# Patient Record
Sex: MALE | Race: WHITE | Employment: OTHER | URBAN - METROPOLITAN AREA
[De-identification: names, ages, dates, MRNs, and addresses within clinical notes are randomized per-mention and may not be internally consistent; named-entity substitution may affect disease eponyms.]

---

## 2023-02-24 ENCOUNTER — APPOINTMENT (OUTPATIENT)
Dept: GENERAL RADIOLOGY | Age: 78
DRG: 282 | End: 2023-02-24
Attending: EMERGENCY MEDICINE
Payer: MEDICARE

## 2023-02-24 ENCOUNTER — HOSPITAL ENCOUNTER (INPATIENT)
Age: 78
LOS: 4 days | Discharge: HOME | DRG: 282 | End: 2023-02-28
Attending: EMERGENCY MEDICINE | Admitting: INTERNAL MEDICINE
Payer: MEDICARE

## 2023-02-24 DIAGNOSIS — I21.4 NON-ST ELEVATION MYOCARDIAL INFARCTION (NSTEMI) (HCC): ICD-10-CM

## 2023-02-24 DIAGNOSIS — I21.4 NSTEMI (NON-ST ELEVATED MYOCARDIAL INFARCTION) (HCC): ICD-10-CM

## 2023-02-24 DIAGNOSIS — R07.9 ACUTE CHEST PAIN: ICD-10-CM

## 2023-02-24 DIAGNOSIS — I10 PRIMARY HYPERTENSION: ICD-10-CM

## 2023-02-24 DIAGNOSIS — I35.0 AORTIC VALVE STENOSIS, ETIOLOGY OF CARDIAC VALVE DISEASE UNSPECIFIED: ICD-10-CM

## 2023-02-24 DIAGNOSIS — R77.8 ELEVATED TROPONIN: Primary | ICD-10-CM

## 2023-02-24 DIAGNOSIS — R07.9 CHEST PAIN, UNSPECIFIED TYPE: ICD-10-CM

## 2023-02-24 LAB
ALBUMIN SERPL-MCNC: 4 G/DL (ref 3.5–5)
ALBUMIN/GLOB SERPL: 1.2 (ref 1.1–2.2)
ALP SERPL-CCNC: 59 U/L (ref 45–117)
ALT SERPL-CCNC: 31 U/L (ref 12–78)
ANION GAP SERPL CALC-SCNC: 3 MMOL/L (ref 5–15)
APTT PPP: 27 SEC (ref 22.1–31)
APTT PPP: 27.1 SEC (ref 22.1–31)
AST SERPL-CCNC: 23 U/L (ref 15–37)
BASOPHILS # BLD: 0 K/UL (ref 0–0.1)
BASOPHILS # BLD: 0 K/UL (ref 0–0.1)
BASOPHILS NFR BLD: 0 % (ref 0–1)
BASOPHILS NFR BLD: 0 % (ref 0–1)
BILIRUB SERPL-MCNC: 0.6 MG/DL (ref 0.2–1)
BUN SERPL-MCNC: 17 MG/DL (ref 6–20)
BUN/CREAT SERPL: 16 (ref 12–20)
CALCIUM SERPL-MCNC: 9.2 MG/DL (ref 8.5–10.1)
CHLORIDE SERPL-SCNC: 106 MMOL/L (ref 97–108)
CO2 SERPL-SCNC: 29 MMOL/L (ref 21–32)
COMMENT, HOLDF: NORMAL
COMMENT, HOLDF: NORMAL
CREAT SERPL-MCNC: 1.04 MG/DL (ref 0.7–1.3)
D DIMER PPP FEU-MCNC: 0.35 MG/L FEU (ref 0–0.65)
DIFFERENTIAL METHOD BLD: NORMAL
DIFFERENTIAL METHOD BLD: NORMAL
EOSINOPHIL # BLD: 0.1 K/UL (ref 0–0.4)
EOSINOPHIL # BLD: 0.1 K/UL (ref 0–0.4)
EOSINOPHIL NFR BLD: 1 % (ref 0–7)
EOSINOPHIL NFR BLD: 2 % (ref 0–7)
ERYTHROCYTE [DISTWIDTH] IN BLOOD BY AUTOMATED COUNT: 12.6 % (ref 11.5–14.5)
ERYTHROCYTE [DISTWIDTH] IN BLOOD BY AUTOMATED COUNT: 12.6 % (ref 11.5–14.5)
GLOBULIN SER CALC-MCNC: 3.3 G/DL (ref 2–4)
GLUCOSE SERPL-MCNC: 103 MG/DL (ref 65–100)
HCT VFR BLD AUTO: 38.5 % (ref 36.6–50.3)
HCT VFR BLD AUTO: 40.1 % (ref 36.6–50.3)
HGB BLD-MCNC: 13 G/DL (ref 12.1–17)
HGB BLD-MCNC: 13.3 G/DL (ref 12.1–17)
IMM GRANULOCYTES # BLD AUTO: 0 K/UL (ref 0–0.04)
IMM GRANULOCYTES # BLD AUTO: 0 K/UL (ref 0–0.04)
IMM GRANULOCYTES NFR BLD AUTO: 0 % (ref 0–0.5)
IMM GRANULOCYTES NFR BLD AUTO: 0 % (ref 0–0.5)
INR PPP: 1 (ref 0.9–1.1)
LYMPHOCYTES # BLD: 1.7 K/UL (ref 0.8–3.5)
LYMPHOCYTES # BLD: 2.1 K/UL (ref 0.8–3.5)
LYMPHOCYTES NFR BLD: 24 % (ref 12–49)
LYMPHOCYTES NFR BLD: 35 % (ref 12–49)
MCH RBC QN AUTO: 29.6 PG (ref 26–34)
MCH RBC QN AUTO: 30.1 PG (ref 26–34)
MCHC RBC AUTO-ENTMCNC: 33.2 G/DL (ref 30–36.5)
MCHC RBC AUTO-ENTMCNC: 33.8 G/DL (ref 30–36.5)
MCV RBC AUTO: 89.1 FL (ref 80–99)
MCV RBC AUTO: 89.3 FL (ref 80–99)
MONOCYTES # BLD: 0.6 K/UL (ref 0–1)
MONOCYTES # BLD: 0.8 K/UL (ref 0–1)
MONOCYTES NFR BLD: 13 % (ref 5–13)
MONOCYTES NFR BLD: 8 % (ref 5–13)
NEUTS SEG # BLD: 3 K/UL (ref 1.8–8)
NEUTS SEG # BLD: 4.8 K/UL (ref 1.8–8)
NEUTS SEG NFR BLD: 50 % (ref 32–75)
NEUTS SEG NFR BLD: 67 % (ref 32–75)
NRBC # BLD: 0 K/UL (ref 0–0.01)
NRBC # BLD: 0 K/UL (ref 0–0.01)
NRBC BLD-RTO: 0 PER 100 WBC
NRBC BLD-RTO: 0 PER 100 WBC
PLATELET # BLD AUTO: 172 K/UL (ref 150–400)
PLATELET # BLD AUTO: 178 K/UL (ref 150–400)
PMV BLD AUTO: 10.5 FL (ref 8.9–12.9)
PMV BLD AUTO: 10.7 FL (ref 8.9–12.9)
POTASSIUM SERPL-SCNC: 3.9 MMOL/L (ref 3.5–5.1)
PROT SERPL-MCNC: 7.3 G/DL (ref 6.4–8.2)
PROTHROMBIN TIME: 10.6 SEC (ref 9–11.1)
RBC # BLD AUTO: 4.32 M/UL (ref 4.1–5.7)
RBC # BLD AUTO: 4.49 M/UL (ref 4.1–5.7)
SAMPLES BEING HELD,HOLD: NORMAL
SAMPLES BEING HELD,HOLD: NORMAL
SODIUM SERPL-SCNC: 138 MMOL/L (ref 136–145)
THERAPEUTIC RANGE,PTTT: NORMAL SECS (ref 58–77)
THERAPEUTIC RANGE,PTTT: NORMAL SECS (ref 58–77)
TROPONIN I SERPL HS-MCNC: 12 NG/L (ref 0–76)
TROPONIN I SERPL HS-MCNC: 132 NG/L (ref 0–76)
TROPONIN I SERPL HS-MCNC: 360 NG/L (ref 0–76)
UFH PPP CHRO-ACNC: <0.1 IU/ML
WBC # BLD AUTO: 6.1 K/UL (ref 4.1–11.1)
WBC # BLD AUTO: 7.2 K/UL (ref 4.1–11.1)

## 2023-02-24 PROCEDURE — 74011250637 HC RX REV CODE- 250/637: Performed by: INTERNAL MEDICINE

## 2023-02-24 PROCEDURE — 85025 COMPLETE CBC W/AUTO DIFF WBC: CPT

## 2023-02-24 PROCEDURE — 85379 FIBRIN DEGRADATION QUANT: CPT

## 2023-02-24 PROCEDURE — 71046 X-RAY EXAM CHEST 2 VIEWS: CPT

## 2023-02-24 PROCEDURE — 99285 EMERGENCY DEPT VISIT HI MDM: CPT

## 2023-02-24 PROCEDURE — 65270000046 HC RM TELEMETRY

## 2023-02-24 PROCEDURE — 74011250637 HC RX REV CODE- 250/637: Performed by: EMERGENCY MEDICINE

## 2023-02-24 PROCEDURE — 93005 ELECTROCARDIOGRAM TRACING: CPT

## 2023-02-24 PROCEDURE — APPSS30 APP SPLIT SHARED TIME 16-30 MINUTES: Performed by: NURSE PRACTITIONER

## 2023-02-24 PROCEDURE — 85610 PROTHROMBIN TIME: CPT

## 2023-02-24 PROCEDURE — 74011000250 HC RX REV CODE- 250: Performed by: INTERNAL MEDICINE

## 2023-02-24 PROCEDURE — 84484 ASSAY OF TROPONIN QUANT: CPT

## 2023-02-24 PROCEDURE — 85730 THROMBOPLASTIN TIME PARTIAL: CPT

## 2023-02-24 PROCEDURE — 85520 HEPARIN ASSAY: CPT

## 2023-02-24 PROCEDURE — 80053 COMPREHEN METABOLIC PANEL: CPT

## 2023-02-24 PROCEDURE — 36415 COLL VENOUS BLD VENIPUNCTURE: CPT

## 2023-02-24 PROCEDURE — 74011250636 HC RX REV CODE- 250/636: Performed by: INTERNAL MEDICINE

## 2023-02-24 PROCEDURE — 99223 1ST HOSP IP/OBS HIGH 75: CPT | Performed by: SPECIALIST

## 2023-02-24 RX ORDER — LABETALOL HCL 20 MG/4 ML
20 SYRINGE (ML) INTRAVENOUS
Status: DISCONTINUED | OUTPATIENT
Start: 2023-02-24 | End: 2023-02-28 | Stop reason: HOSPADM

## 2023-02-24 RX ORDER — ACETAMINOPHEN 650 MG/1
650 SUPPOSITORY RECTAL
Status: DISCONTINUED | OUTPATIENT
Start: 2023-02-24 | End: 2023-02-28 | Stop reason: HOSPADM

## 2023-02-24 RX ORDER — ROSUVASTATIN CALCIUM 10 MG/1
10 TABLET, COATED ORAL
COMMUNITY
End: 2023-04-17

## 2023-02-24 RX ORDER — TELMISARTAN 20 MG/1
20 TABLET ORAL DAILY
COMMUNITY
End: 2023-03-09

## 2023-02-24 RX ORDER — LOSARTAN POTASSIUM 50 MG/1
50 TABLET ORAL
Status: DISCONTINUED | OUTPATIENT
Start: 2023-02-24 | End: 2023-02-28 | Stop reason: HOSPADM

## 2023-02-24 RX ORDER — SODIUM CHLORIDE 0.9 % (FLUSH) 0.9 %
5-40 SYRINGE (ML) INJECTION AS NEEDED
Status: DISCONTINUED | OUTPATIENT
Start: 2023-02-24 | End: 2023-02-28 | Stop reason: HOSPADM

## 2023-02-24 RX ORDER — GUAIFENESIN 100 MG/5ML
81 LIQUID (ML) ORAL DAILY
Status: DISCONTINUED | OUTPATIENT
Start: 2023-02-25 | End: 2023-02-28 | Stop reason: HOSPADM

## 2023-02-24 RX ORDER — NITROGLYCERIN 0.4 MG/1
0.4 TABLET SUBLINGUAL AS NEEDED
Status: DISCONTINUED | OUTPATIENT
Start: 2023-02-24 | End: 2023-02-24 | Stop reason: SDUPTHER

## 2023-02-24 RX ORDER — NITROGLYCERIN 0.4 MG/1
0.4 TABLET SUBLINGUAL
Status: DISCONTINUED | OUTPATIENT
Start: 2023-02-24 | End: 2023-02-28 | Stop reason: HOSPADM

## 2023-02-24 RX ORDER — EZETIMIBE 10 MG/1
10 TABLET ORAL DAILY
COMMUNITY

## 2023-02-24 RX ORDER — SODIUM CHLORIDE 9 MG/ML
50 INJECTION, SOLUTION INTRAVENOUS CONTINUOUS
Status: DISCONTINUED | OUTPATIENT
Start: 2023-02-24 | End: 2023-02-28 | Stop reason: HOSPADM

## 2023-02-24 RX ORDER — EZETIMIBE 10 MG/1
10 TABLET ORAL DAILY
Status: DISCONTINUED | OUTPATIENT
Start: 2023-02-25 | End: 2023-02-28 | Stop reason: HOSPADM

## 2023-02-24 RX ORDER — FACIAL-BODY WIPES
10 EACH TOPICAL DAILY PRN
Status: DISCONTINUED | OUTPATIENT
Start: 2023-02-24 | End: 2023-02-28 | Stop reason: HOSPADM

## 2023-02-24 RX ORDER — ROSUVASTATIN CALCIUM 10 MG/1
10 TABLET, COATED ORAL
Status: DISCONTINUED | OUTPATIENT
Start: 2023-02-24 | End: 2023-02-25

## 2023-02-24 RX ORDER — PROMETHAZINE HYDROCHLORIDE 25 MG/1
12.5 TABLET ORAL
Status: DISCONTINUED | OUTPATIENT
Start: 2023-02-24 | End: 2023-02-28 | Stop reason: HOSPADM

## 2023-02-24 RX ORDER — ONDANSETRON 2 MG/ML
4 INJECTION INTRAMUSCULAR; INTRAVENOUS
Status: DISCONTINUED | OUTPATIENT
Start: 2023-02-24 | End: 2023-02-28 | Stop reason: HOSPADM

## 2023-02-24 RX ORDER — SODIUM CHLORIDE 0.9 % (FLUSH) 0.9 %
5-40 SYRINGE (ML) INJECTION EVERY 8 HOURS
Status: DISCONTINUED | OUTPATIENT
Start: 2023-02-24 | End: 2023-02-28 | Stop reason: HOSPADM

## 2023-02-24 RX ORDER — HEPARIN SODIUM 1000 [USP'U]/ML
4000 INJECTION, SOLUTION INTRAVENOUS; SUBCUTANEOUS ONCE
Status: COMPLETED | OUTPATIENT
Start: 2023-02-24 | End: 2023-02-24

## 2023-02-24 RX ORDER — ACETAMINOPHEN 325 MG/1
650 TABLET ORAL
Status: DISCONTINUED | OUTPATIENT
Start: 2023-02-24 | End: 2023-02-28 | Stop reason: HOSPADM

## 2023-02-24 RX ORDER — MORPHINE SULFATE 2 MG/ML
2 INJECTION, SOLUTION INTRAMUSCULAR; INTRAVENOUS
Status: DISCONTINUED | OUTPATIENT
Start: 2023-02-24 | End: 2023-02-28 | Stop reason: HOSPADM

## 2023-02-24 RX ORDER — HEPARIN SODIUM 10000 [USP'U]/100ML
9-25 INJECTION, SOLUTION INTRAVENOUS
Status: DISCONTINUED | OUTPATIENT
Start: 2023-02-24 | End: 2023-02-27

## 2023-02-24 RX ORDER — SODIUM CHLORIDE 9 MG/ML
25 INJECTION, SOLUTION INTRAVENOUS AS NEEDED
Status: DISCONTINUED | OUTPATIENT
Start: 2023-02-24 | End: 2023-02-28 | Stop reason: HOSPADM

## 2023-02-24 RX ADMIN — SODIUM CHLORIDE, PRESERVATIVE FREE 10 ML: 5 INJECTION INTRAVENOUS at 22:59

## 2023-02-24 RX ADMIN — SODIUM CHLORIDE, PRESERVATIVE FREE 10 ML: 5 INJECTION INTRAVENOUS at 18:34

## 2023-02-24 RX ADMIN — HEPARIN SODIUM 9 UNITS/KG/HR: 10000 INJECTION, SOLUTION INTRAVENOUS at 18:31

## 2023-02-24 RX ADMIN — HEPARIN SODIUM 4000 UNITS: 1000 INJECTION INTRAVENOUS; SUBCUTANEOUS at 18:25

## 2023-02-24 RX ADMIN — LOSARTAN POTASSIUM 50 MG: 50 TABLET, FILM COATED ORAL at 22:59

## 2023-02-24 RX ADMIN — NITROGLYCERIN 0.4 MG: 0.4 TABLET, ORALLY DISINTEGRATING SUBLINGUAL at 12:40

## 2023-02-24 RX ADMIN — ROSUVASTATIN CALCIUM 10 MG: 10 TABLET, COATED ORAL at 22:59

## 2023-02-24 RX ADMIN — SODIUM CHLORIDE 50 ML/HR: 9 INJECTION, SOLUTION INTRAVENOUS at 20:40

## 2023-02-24 NOTE — ED PROVIDER NOTES
HPI     Please note that this dictation was completed with [employer], the computer voice recognition software. Quite often unanticipated grammatical, syntax, homophones, and other interpretive errors are inadvertently transcribed by the computer software. Please disregard these errors. Please excuse any errors that have escaped final proofreading. 73-year-old male with a history of hypertension hypercholesterolemia here with substernal chest tightness occurring 30 minutes prior to arrival.  Currently 3-4 out of 10 in type. Nonradiating. Not associate with any shortness of breath, nausea, vomiting, sweating. He took 1 BC powder prior to arrival.  He feels lightheaded. Last stress test was 1 year ago performed by his PCP and what he reports is normal.  Positive family history of heart disease with his father. No other complaints at this time. No leg pain or swelling. Past Medical History:   Diagnosis Date    Hypercholesterolemia     Hypertension        No past surgical history on file. No family history on file. Social History     Socioeconomic History    Marital status: Not on file     Spouse name: Not on file    Number of children: Not on file    Years of education: Not on file    Highest education level: Not on file   Occupational History    Not on file   Tobacco Use    Smoking status: Not on file    Smokeless tobacco: Not on file   Substance and Sexual Activity    Alcohol use: Not on file    Drug use: Not on file    Sexual activity: Not on file   Other Topics Concern    Not on file   Social History Narrative    Not on file     Social Determinants of Health     Financial Resource Strain: Not on file   Food Insecurity: Not on file   Transportation Needs: Not on file   Physical Activity: Not on file   Stress: Not on file   Social Connections: Not on file   Intimate Partner Violence: Not on file   Housing Stability: Not on file         ALLERGIES: Patient has no known allergies.     Review of Systems   Constitutional:  Negative for chills and fever. Respiratory:  Positive for chest tightness. Negative for shortness of breath. Cardiovascular:  Positive for chest pain. Gastrointestinal:  Negative for diarrhea, nausea and vomiting. Vitals:    02/24/23 1225   BP: (!) 149/74   Pulse: 69   Resp: 18   Temp: 98.7 °F (37.1 °C)   SpO2: 98%   Weight: 104.3 kg (230 lb)   Height: 5' 11\" (1.803 m)            Physical Exam  Vitals and nursing note reviewed. Constitutional:       Appearance: Normal appearance. He is well-developed. HENT:      Head: Normocephalic and atraumatic. Nose: No congestion or rhinorrhea. Mouth/Throat:      Mouth: Mucous membranes are moist.   Eyes:      General: No scleral icterus. Right eye: No discharge. Left eye: No discharge. Conjunctiva/sclera: Conjunctivae normal.   Cardiovascular:      Rate and Rhythm: Normal rate and regular rhythm. Heart sounds: Normal heart sounds. No murmur heard. No friction rub. No gallop. Pulmonary:      Effort: Pulmonary effort is normal. No respiratory distress. Breath sounds: Normal breath sounds. No wheezing or rales. Abdominal:      General: There is no distension. Palpations: Abdomen is soft. Tenderness: There is no abdominal tenderness. There is no guarding. Musculoskeletal:         General: No swelling or deformity. Normal range of motion. Cervical back: Normal range of motion and neck supple. No rigidity. Right lower leg: No edema. Left lower leg: No edema. Lymphadenopathy:      Cervical: No cervical adenopathy. Skin:     General: Skin is warm and dry. Coloration: Skin is not jaundiced or pale. Findings: No rash. Neurological:      Mental Status: He is alert and oriented to person, place, and time.       Comments: MARIAN        Medical Decision Making  68-year-old male here with substernal chest pressure with risk factors of hypertension, hypercholesterolemia and family history. He took aspirin prior to arrival.  Differential diagnosis includes GERD, MI, angina and others. Will give nitroglycerin some lingual.  He already took aspirin prior to arrival.  Will check chest x-ray and labs. Amount and/or Complexity of Data Reviewed  Labs: ordered. Decision-making details documented in ED Course. Radiology: ordered. Decision-making details documented in ED Course. ECG/medicine tests: ordered and independent interpretation performed. Decision-making details documented in ED Course. Risk  Prescription drug management. Decision regarding hospitalization. ED Course as of 02/24/23 1619   Fri Feb 24, 2023   1459 Updated patient on lab delay as I had to add on the labs. Answered his questions. [RG]   7519 Patient is currently chest pain-free after 1 nitroglycerin. [RG]      ED Course User Index  [RG] María Elena Crocker MD       Procedures      ED EKG interpretation:  Rhythm: normal sinus rhythm; and regular . Rate (approx.): 70; Axis: normal; P wave: normal; QRS interval: normal ; ST/T wave: normal;  This EKG was interpreted by Delila Denver, MD,ED Provider. 4:20 PM  Elevated repeat trop. Consulted cardiology and will admit. Remains cp free. Perfect Serve Consult for Admission  4:20 PM    ED Room Number: ER02/02  Patient Name and age:  Brisa Jackson 76 y.o.  male  Working Diagnosis:   1. Elevated troponin    2. Acute chest pain        COVID-19 Suspicion:  no  Sepsis present:  no  Reassessment needed: no  Code Status:  Full Code  Readmission: no  Isolation Requirements:  no  Recommended Level of Care:  telemetry  Department: [facility] ED - [phone]      Other:  trop from 12 to 132. Cp free after 1 ntg. Took a bc powder. Consulting cardiology.           Recent Results (from the past 24 hour(s))   EKG, 12 LEAD, INITIAL    Collection Time: 02/24/23 12:09 PM   Result Value Ref Range    Ventricular Rate 70 BPM Atrial Rate 70 BPM    P-R Interval 168 ms    QRS Duration 98 ms    Q-T Interval 366 ms    QTC Calculation (Bezet) 395 ms    Calculated P Axis 54 degrees    Calculated R Axis 7 degrees    Calculated T Axis 39 degrees    Diagnosis       Normal sinus rhythm  Normal ECG  When compared with ECG of 20-SEP-1997 12:25,  Previous ECG has undetermined rhythm, needs review  QRS axis shifted left  Criteria for Lateral infarct are no longer present  Criteria for Inferior infarct are no longer present  T wave inversion no longer evident in Lateral leads     SAMPLES BEING HELD    Collection Time: 02/24/23 12:24 PM   Result Value Ref Range    SAMPLES BEING HELD 1BLUE,1PST,1SST,1LAV,1RED,1DK GRN     COMMENT        Add-on orders for these samples will be processed based on acceptable specimen integrity and analyte stability, which may vary by analyte. CBC WITH AUTOMATED DIFF    Collection Time: 02/24/23 12:24 PM   Result Value Ref Range    WBC 6.1 4.1 - 11.1 K/uL    RBC 4.49 4. 10 - 5.70 M/uL    HGB 13.3 12.1 - 17.0 g/dL    HCT 40.1 36.6 - 50.3 %    MCV 89.3 80.0 - 99.0 FL    MCH 29.6 26.0 - 34.0 PG    MCHC 33.2 30.0 - 36.5 g/dL    RDW 12.6 11.5 - 14.5 %    PLATELET 728 142 - 192 K/uL    MPV 10.7 8.9 - 12.9 FL    NRBC 0.0 0  WBC    ABSOLUTE NRBC 0.00 0.00 - 0.01 K/uL    NEUTROPHILS 50 32 - 75 %    LYMPHOCYTES 35 12 - 49 %    MONOCYTES 13 5 - 13 %    EOSINOPHILS 2 0 - 7 %    BASOPHILS 0 0 - 1 %    IMMATURE GRANULOCYTES 0 0.0 - 0.5 %    ABS. NEUTROPHILS 3.0 1.8 - 8.0 K/UL    ABS. LYMPHOCYTES 2.1 0.8 - 3.5 K/UL    ABS. MONOCYTES 0.8 0.0 - 1.0 K/UL    ABS. EOSINOPHILS 0.1 0.0 - 0.4 K/UL    ABS. BASOPHILS 0.0 0.0 - 0.1 K/UL    ABS. IMM.  GRANS. 0.0 0.00 - 0.04 K/UL    DF AUTOMATED     METABOLIC PANEL, COMPREHENSIVE    Collection Time: 02/24/23 12:24 PM   Result Value Ref Range    Sodium 138 136 - 145 mmol/L    Potassium 3.9 3.5 - 5.1 mmol/L    Chloride 106 97 - 108 mmol/L    CO2 29 21 - 32 mmol/L    Anion gap 3 (L) 5 - 15 mmol/L Glucose 103 (H) 65 - 100 mg/dL    BUN 17 6 - 20 MG/DL    Creatinine 1.04 0.70 - 1.30 MG/DL    BUN/Creatinine ratio 16 12 - 20      eGFR >60 >60 ml/min/1.73m2    Calcium 9.2 8.5 - 10.1 MG/DL    Bilirubin, total 0.6 0.2 - 1.0 MG/DL    ALT (SGPT) 31 12 - 78 U/L    AST (SGOT) 23 15 - 37 U/L    Alk. phosphatase 59 45 - 117 U/L    Protein, total 7.3 6.4 - 8.2 g/dL    Albumin 4.0 3.5 - 5.0 g/dL    Globulin 3.3 2.0 - 4.0 g/dL    A-G Ratio 1.2 1.1 - 2.2     PROTHROMBIN TIME + INR    Collection Time: 02/24/23 12:24 PM   Result Value Ref Range    INR 1.0 0.9 - 1.1      Prothrombin time 10.6 9.0 - 11.1 sec   PTT    Collection Time: 02/24/23 12:24 PM   Result Value Ref Range    aPTT 27.1 22.1 - 31.0 sec    aPTT, therapeutic range     58.0 - 77.0 SECS   TROPONIN-HIGH SENSITIVITY    Collection Time: 02/24/23 12:24 PM   Result Value Ref Range    Troponin-High Sensitivity 12 0 - 76 ng/L   TROPONIN-HIGH SENSITIVITY    Collection Time: 02/24/23  3:11 PM   Result Value Ref Range    Troponin-High Sensitivity 132 (HH) 0 - 76 ng/L       XR CHEST PA LAT    Result Date: 2/24/2023  INDICATION:  chest pain. EXAM: 2 VIEW CHEST RADIOGRAPH. COMPARISON: None. FINDINGS: Frontal and lateral views of the chest show a moderately large hiatal hernia. No focal infiltrate or effusion. No CHF pattern. . . Minimal bibasilar atelectasis. The heart, mediastinum and pulmonary vasculature are upper normal. The bony thorax is unremarkable for age. ..      Minimal bibasilar atelectasis. Moderately large hiatal hernia. Gilda Verde

## 2023-02-24 NOTE — ED NOTES
Assumed care of the patient from [address] [place] [address]. Patient noted to be on the cardiac monitor, verbalized not happy with the news to be admitted to the hospital, labs reviewed.

## 2023-02-24 NOTE — H&P
Steve Andrade [facility] [address]  [address], [place], [address] [facility]  [phone]    Hospitalist Admission History and Physical      NAME:  Ramila Brenner   :   1945   MRN:  [id number]     PCP:  None     Date/Time of service:  2023  4:54 PM        Subjective:     CHIEF COMPLAINT: chest pain     HISTORY OF PRESENT ILLNESS:     The patient is a 75 yo hx of HTN, hyperlipidemia, presented w/ chest pain, NSTEMI. The patient c/o \"chest tightness\" today. The pain was non-radiating, not associated with dyspnea or diaphoresis. In the ED, his chest pain has improved. EKG non-ischemic, but Trop increased from 0 to 132. No Known Allergies    Prior to Admission medications    Medication Sig Start Date End Date Taking? Authorizing Provider   rosuvastatin (CRESTOR) 10 mg tablet Take 10 mg by mouth nightly. Yes Other, MD Jana   ezetimibe (ZETIA) 10 mg tablet Take 10 mg by mouth. Yes Other, MD Jana   telmisartan (MICARDIS) 20 mg tablet Take 20 mg by mouth daily. Yes Other, MD Jana       Past Medical History:   Diagnosis Date    Hypercholesterolemia     Hypertension         No past surgical history on file.     Social History     Tobacco Use    Smoking status: Former     Types: Cigarettes    Smokeless tobacco: Never   Substance Use Topics    Alcohol use: Not Currently        Family History   Problem Relation Age of Onset    Heart Disease Father         Review of Systems:  (bold if positive, if negative)    Gen:  Eyes:  ENT:  CVS:  chest pain, Pulm:  GI:  :  MS:  Skin:  Psych:  Endo:  Hem:  Renal:  Neuro:          Objective:      VITALS:    Vital signs reviewed; most recent are:    Visit Vitals  BP (!) 164/59   Pulse 71   Temp 97.9 °F (36.6 °C)   Resp 20   Ht 5' 11\" (1.803 m)   Wt 104.3 kg (230 lb)   SpO2 100%   BMI 32.08 kg/m²     SpO2 Readings from Last 6 Encounters:   23 100%        No intake or output data in the 24 hours ending 23 6457     Exam:     Physical Exam:    Gen:  Well-developed, well-nourished, morbidly obese, in no acute distress  HEENT:  Pink conjunctivae, PERRL, hearing intact to voice, moist mucous membranes  Neck:  Supple, without masses, thyroid non-tender  Resp:  No accessory muscle use, clear breath sounds without wheezes rales or rhonchi  Card:  No murmurs, normal S1, S2 without thrills, bruits or peripheral edema  Abd:  Soft, non-tender, non-distended, normoactive bowel sounds are present  Lymph:  No cervical adenopathy  Musc:  No cyanosis or clubbing  Skin:  No rashes   Neuro:  Cranial nerves 3-12 are grossly intact, follows commands appropriately  Psych:  Alert with good insight. Oriented to person, place, and time    Labs:    Recent Labs     02/24/23  1224   WBC 6.1   HGB 13.3   HCT 40.1        Recent Labs     02/24/23  1224      K 3.9      CO2 29   *   BUN 17   CREA 1.04   CA 9.2   ALB 4.0   TBILI 0.6   ALT 31     No results found for: GLUCPOC  No results for input(s): PH, PCO2, PO2, HCO3, FIO2 in the last 72 hours. Recent Labs     02/24/23  1224   INR 1.0       Radiology and EKG reviewed:   CXR neg    **Prior records, notes, labs, radiology, and medications reviewed in [facility]**       Assessment/Plan:       Principal Problem:    77 yo hx of HTN, hyperlipidemia, presented w/ chest pain, NSTEMI    1) Angina/NSTEMI (non-ST elevated myocardial infarction): EKG non-ischemic, but Trop increased from 0 to 132 in 3 hours. Will monitor on Tele. Check serial enzymes, lipids, A1C, echo. Start ASA, nitro prn, IV morphine prn, heparin gtt. Consult Cards for cath    2) HTN: BP high. Resume home ARB. Use IV labetalol prn    3) Hyperlipidemia: recheck lipids.   Cont statin    Risk of deterioration: high      Total time spent with patient care: 79 Minutes **I personally saw and examined the patient during this time period**                 Care Plan discussed with: Patient, nursing, wife     Discussed:  Care Plan    Prophylaxis:   heparin gtt    Probable Disposition:  Home w/Family           ___________________________________________________    Attending Physician: Lesia Magallon MD

## 2023-02-24 NOTE — Clinical Note
Pain assessment performed by BEBE Villegas RN placed in [place] , and applied to [employer]

## 2023-02-24 NOTE — PROGRESS NOTES
[address] [facility]                    Cardiology Care Note     [x]Initial Encounter     []Follow-up    Patient Name: Cortney Irwin - VNS:[birth year] - EGW:[id number]  Primary Cardiologist: none   Consulting Cardiologist: [employer] Cardiology Physicians: Mateo Arcos MD     Reason for encounter: CP    HPI:       Cortney Irwin is a 76 y.o. male with PMH significant for HTN and HLD    Pt reports earlier today he felt a tightness in his chest and discomfort. Nothing seemed to aggravate or relieve the pain. Became concerned so came to the ED. He denies any palpitations, SOB, edema. No prior cardiac hx. Reports his father had \"heart problems\" but did not have a heart attack. Trop mildly elevated since admission, chest xray with hiatal hernia which pt was unaware of. Subjective:      Cortney Irwin reports none. Assessment and Plan     CP: could be partially caused by mod large hiatal hernia. Initial troponin 12-->132. Trend troponin for now, if cont to rise could start on lovenox bid. Check TTE. EKG non ischemic. Further plans tbd, pt wants to be discharged today if possible     2. Murmur: check TTE    3. Hiatal hernia: mod large seen on chest xray    4. HTN: on micardis PTA     5. HLD: on crestor , Zetia          ____________________________________________________________    Cardiac testing    N/a     Most recent HS troponins:  Recent Labs     02/24/23  1511 02/24/23  1224   TROPHS 132* 12       ECG: normal sinus rhythm    Review of Systems:    [x]All other systems reviewed and all negative except as written in HPI    [] Patient unable to provide secondary to condition    Past Medical History:   Diagnosis Date    Hypercholesterolemia     Hypertension      No past surgical history on file. Social Hx:  reports that he has quit smoking. His smoking use included cigarettes.  He has never used smokeless tobacco. He reports that he does not currently use alcohol. Family Hx: family history includes Heart Disease in his father. No Known Allergies       OBJECTIVE:  Wt Readings from Last 3 Encounters:   02/24/23 104.3 kg (230 lb)     No intake or output data in the 24 hours ending 02/24/23 1627    Physical Exam:    Vitals:   Vitals:    02/24/23 1225 02/24/23 1257 02/24/23 1541   BP: (!) 149/74 119/75 (!) 155/79   Pulse: 69 66 61   Resp: 18 17 18   Temp: 98.7 °F (37.1 °C) 98.2 °F (36.8 °C) 97.9 °F (36.6 °C)   SpO2: 98% 96% 99%   Weight: 104.3 kg (230 lb)     Height: 5' 11\" (1.803 m)       Telemetry: normal sinus rhythm    Gen: Well-developed, well-nourished, in no acute distress  Neck: Supple, No JVD, No Carotid Bruit  Resp: No accessory muscle use, Clear breath sounds, No rales or rhonchi  Card: Regular Rate,Rythm, Normal S1, S2, 2/6 murmurs, no rubs or gallop. No thrills. Abd:   Soft, non-tender, non-distended, BS+   MSK: No cyanosis  Skin: No rashes    Neuro: Moving all four extremities, follows commands appropriately  Psych: Good insight, oriented to person, place, alert, Nml Affect  LE: No edema    Data Review:     Radiology:   XR Results (most recent):  Results from Hospital Encounter encounter on 02/24/23    XR CHEST PA LAT    Narrative  INDICATION:  chest pain. EXAM: 2 VIEW CHEST RADIOGRAPH. COMPARISON: None. FINDINGS: Frontal and lateral views of the chest show a moderately large hiatal  hernia. No focal infiltrate or effusion. No CHF pattern. . . Minimal bibasilar  atelectasis. The heart, mediastinum and pulmonary vasculature are upper normal.  The bony thorax is unremarkable for age. ..    Impression  Minimal bibasilar atelectasis. Moderately large hiatal hernia. .      Recent Labs     02/24/23  1224      K 3.9      CO2 29   BUN 17   CREA 1.04   *   CA 9.2     Recent Labs     02/24/23  1224   WBC 6.1   HGB 13.3   HCT 40.1        Recent Labs     02/24/23  1224   PTP 10.6   INR 1.0   AP 59     No results for input(s): CHOL, LDLC in the last 72 hours. No lab exists for component: TGL, HDLC,  HBA1C      Current meds:    Current Facility-Administered Medications:     nitroglycerin (NITROSTAT) tablet 0.4 mg, 0.4 mg, SubLINGual, Q5MIN PRN, Adia Lawrence MD, 0.4 mg at 02/24/23 1240    Current Outpatient Medications:     rosuvastatin (CRESTOR) 10 mg tablet, Take 10 mg by mouth nightly., Disp: , Rfl:     ezetimibe (ZETIA) 10 mg tablet, Take 10 mg by mouth., Disp: , Rfl:     telmisartan (MICARDIS) 20 mg tablet, Take 20 mg by mouth daily. , Disp: , Rfl:     Keira Contreras NP    [facility] Cardiology  Call center: (U) [phone]  (N) [phone]      CC:None

## 2023-02-24 NOTE — Clinical Note
TRANSFER - OUT REPORT:     Verbal report given to: Ida Vides . Report consisted of patient's Situation, Background, Assessment and   Recommendations(SBAR). Opportunity for questions and clarification was provided. Patient transported with a Registered Nurse.

## 2023-02-24 NOTE — Clinical Note
TRANSFER - OUT REPORT:     Verbal report given to: Quinten Estrada . Report consisted of patient's Situation, Background, Assessment and   Recommendations(SBAR). Opportunity for questions and clarification was provided. Patient transported with a Registered Nurse.

## 2023-02-24 NOTE — PROGRESS NOTES
[address] [facility]                    Cardiology Care Note     [x]Initial Encounter     []Follow-up    Patient Name: Marcie Teague - RDL:[birth year] - WXB:[id number]  Primary Cardiologist: none   Consulting Cardiologist: [facility] Cardiology Physicians: David Warren MD     Reason for encounter: CP    HPI:       Marcie Teague is a 76 y.o. male with PMH significant for HTN and HLD    Pt reports earlier today he felt a tightness in his chest and discomfort. Nothing seemed to aggravate or relieve the pain. Became concerned so came to the ED. He denies any palpitations, SOB, edema. No prior cardiac hx. Reports his father had \"heart problems\" but did not have a heart attack. Trop mildly elevated since admission, chest xray with hiatal hernia which pt was unaware of. Subjective:      Marcie Teague reports none. Assessment and Plan     CP: could be partially caused by mod large hiatal hernia. Initial troponin 12-->132. Trend troponin for now, if cont to rise could start on lovenox bid. Check TTE. EKG non ischemic. Further plans tbd, pt wants to be discharged today if possible     2. Murmur: check TTE    3. Hiatal hernia: mod large seen on chest xray    4. HTN: on micardis PTA     5. HLD: on crestor , Zetia          ____________________________________________________________    Cardiac testing    N/a     Most recent HS troponins:  Recent Labs     02/24/23  1511 02/24/23  1224   TROPHS 132* 12       ECG: normal sinus rhythm    Review of Systems:    [x]All other systems reviewed and all negative except as written in HPI    [] Patient unable to provide secondary to condition    Past Medical History:   Diagnosis Date    Hypercholesterolemia     Hypertension      No past surgical history on file. Social Hx:  reports that he has quit smoking. His smoking use included cigarettes.  He has never used smokeless tobacco. He reports that he does not currently use alcohol. Family Hx: family history includes Heart Disease in his father. No Known Allergies       OBJECTIVE:  Wt Readings from Last 3 Encounters:   02/24/23 104.3 kg (230 lb)     No intake or output data in the 24 hours ending 02/24/23 1627    Physical Exam:    Vitals:   Vitals:    02/24/23 1225 02/24/23 1257 02/24/23 1541   BP: (!) 149/74 119/75 (!) 155/79   Pulse: 69 66 61   Resp: 18 17 18   Temp: 98.7 °F (37.1 °C) 98.2 °F (36.8 °C) 97.9 °F (36.6 °C)   SpO2: 98% 96% 99%   Weight: 104.3 kg (230 lb)     Height: 5' 11\" (1.803 m)       Telemetry: normal sinus rhythm    Gen: Well-developed, well-nourished, in no acute distress  Neck: Supple, No JVD, No Carotid Bruit  Resp: No accessory muscle use, Clear breath sounds, No rales or rhonchi  Card: Regular Rate,Rythm, Normal S1, S2, 2/6 murmurs, no rubs or gallop. No thrills. Abd:   Soft, non-tender, non-distended, BS+   MSK: No cyanosis  Skin: No rashes    Neuro: Moving all four extremities, follows commands appropriately  Psych: Good insight, oriented to person, place, alert, Nml Affect  LE: No edema    Data Review:     Radiology:   XR Results (most recent):  Results from Hospital Encounter encounter on 02/24/23    XR CHEST PA LAT    Narrative  INDICATION:  chest pain. EXAM: 2 VIEW CHEST RADIOGRAPH. COMPARISON: None. FINDINGS: Frontal and lateral views of the chest show a moderately large hiatal  hernia. No focal infiltrate or effusion. No CHF pattern. . . Minimal bibasilar  atelectasis. The heart, mediastinum and pulmonary vasculature are upper normal.  The bony thorax is unremarkable for age. ..    Impression  Minimal bibasilar atelectasis. Moderately large hiatal hernia. .      Recent Labs     02/24/23  1224      K 3.9      CO2 29   BUN 17   CREA 1.04   *   CA 9.2     Recent Labs     02/24/23  1224   WBC 6.1   HGB 13.3   HCT 40.1        Recent Labs     02/24/23  1224   PTP 10.6   INR 1.0   AP 59     No results for input(s): CHOL, LDLC in the last 72 hours. No lab exists for component: TGL, HDLC,  HBA1C      Current meds:    Current Facility-Administered Medications:     nitroglycerin (NITROSTAT) tablet 0.4 mg, 0.4 mg, SubLINGual, Q5MIN PRN, Lora Thakkar MD, 0.4 mg at 02/24/23 1240    Current Outpatient Medications:     rosuvastatin (CRESTOR) 10 mg tablet, Take 10 mg by mouth nightly., Disp: , Rfl:     ezetimibe (ZETIA) 10 mg tablet, Take 10 mg by mouth., Disp: , Rfl:     telmisartan (MICARDIS) 20 mg tablet, Take 20 mg by mouth daily. , Disp: , Rfl:     Ramseh Jensen NP    [facility] Cardiology  Call center: (z) [phone] (F) [phone]      CC:None

## 2023-02-24 NOTE — Clinical Note
Diagnostic catheter inserted over exchange length wire.   [address] [facility] No

## 2023-02-24 NOTE — ED PROVIDER NOTES
HPI     Please note that this dictation was completed with weipass, the computer voice recognition software. Quite often unanticipated grammatical, syntax, homophones, and other interpretive errors are inadvertently transcribed by the computer software. Please disregard these errors. Please excuse any errors that have escaped final proofreading. 77-year-old male with a history of hypertension hypercholesterolemia here with substernal chest tightness occurring 30 minutes prior to arrival.  Currently 3-4 out of 10 in type. Nonradiating. Not associate with any shortness of breath, nausea, vomiting, sweating. He took 1 BC powder prior to arrival.  He feels lightheaded. Last stress test was 1 year ago performed by his PCP and what he reports is normal.  Positive family history of heart disease with his father. No other complaints at this time. No leg pain or swelling. Past Medical History:   Diagnosis Date    Hypercholesterolemia     Hypertension        No past surgical history on file. No family history on file. Social History     Socioeconomic History    Marital status: Not on file     Spouse name: Not on file    Number of children: Not on file    Years of education: Not on file    Highest education level: Not on file   Occupational History    Not on file   Tobacco Use    Smoking status: Not on file    Smokeless tobacco: Not on file   Substance and Sexual Activity    Alcohol use: Not on file    Drug use: Not on file    Sexual activity: Not on file   Other Topics Concern    Not on file   Social History Narrative    Not on file     Social Determinants of Health     Financial Resource Strain: Not on file   Food Insecurity: Not on file   Transportation Needs: Not on file   Physical Activity: Not on file   Stress: Not on file   Social Connections: Not on file   Intimate Partner Violence: Not on file   Housing Stability: Not on file         ALLERGIES: Patient has no known allergies.     Review of Systems   Constitutional:  Negative for chills and fever. Respiratory:  Positive for chest tightness. Negative for shortness of breath. Cardiovascular:  Positive for chest pain. Gastrointestinal:  Negative for diarrhea, nausea and vomiting. Vitals:    02/24/23 1225   BP: (!) 149/74   Pulse: 69   Resp: 18   Temp: 98.7 °F (37.1 °C)   SpO2: 98%   Weight: 104.3 kg (230 lb)   Height: 5' 11\" (1.803 m)            Physical Exam  Vitals and nursing note reviewed. Constitutional:       Appearance: Normal appearance. He is well-developed. HENT:      Head: Normocephalic and atraumatic. Nose: No congestion or rhinorrhea. Mouth/Throat:      Mouth: Mucous membranes are moist.   Eyes:      General: No scleral icterus. Right eye: No discharge. Left eye: No discharge. Conjunctiva/sclera: Conjunctivae normal.   Cardiovascular:      Rate and Rhythm: Normal rate and regular rhythm. Heart sounds: Normal heart sounds. No murmur heard. No friction rub. No gallop. Pulmonary:      Effort: Pulmonary effort is normal. No respiratory distress. Breath sounds: Normal breath sounds. No wheezing or rales. Abdominal:      General: There is no distension. Palpations: Abdomen is soft. Tenderness: There is no abdominal tenderness. There is no guarding. Musculoskeletal:         General: No swelling or deformity. Normal range of motion. Cervical back: Normal range of motion and neck supple. No rigidity. Right lower leg: No edema. Left lower leg: No edema. Lymphadenopathy:      Cervical: No cervical adenopathy. Skin:     General: Skin is warm and dry. Coloration: Skin is not jaundiced or pale. Findings: No rash. Neurological:      Mental Status: He is alert and oriented to person, place, and time.       Comments: MARIAN        Medical Decision Making  77-year-old male here with substernal chest pressure with risk factors of hypertension, hypercholesterolemia and family history. He took aspirin prior to arrival.  Differential diagnosis includes GERD, MI, angina and others. Will give nitroglycerin some lingual.  He already took aspirin prior to arrival.  Will check chest x-ray and labs. Amount and/or Complexity of Data Reviewed  Labs: ordered. Decision-making details documented in ED Course. Radiology: ordered. Decision-making details documented in ED Course. ECG/medicine tests: ordered and independent interpretation performed. Decision-making details documented in ED Course. Risk  Prescription drug management. Decision regarding hospitalization. ED Course as of 02/24/23 1619   Fri Feb 24, 2023   1459 Updated patient on lab delay as I had to add on the labs. Answered his questions. [RG]   3980 Patient is currently chest pain-free after 1 nitroglycerin. [RG]      ED Course User Index  [RG] Yoni Millan MD       Procedures      ED EKG interpretation:  Rhythm: normal sinus rhythm; and regular . Rate (approx.): 70; Axis: normal; P wave: normal; QRS interval: normal ; ST/T wave: normal;  This EKG was interpreted by Wendy Greenfield MD,ED Provider. 4:20 PM  Elevated repeat trop. Consulted cardiology and will admit. Remains cp free. Perfect Serve Consult for Admission  4:20 PM    ED Room Number: ER02/02  Patient Name and age:  Leopold Kitty 76 y.o.  male  Working Diagnosis:   1. Elevated troponin    2. Acute chest pain        COVID-19 Suspicion:  no  Sepsis present:  no  Reassessment needed: no  Code Status:  Full Code  Readmission: no  Isolation Requirements:  no  Recommended Level of Care:  telemetry  Department: [facility] ED - [phone]      Other:  trop from 12 to 132. Cp free after 1 ntg. Took a bc powder. Consulting cardiology.           Recent Results (from the past 24 hour(s))   EKG, 12 LEAD, INITIAL    Collection Time: 02/24/23 12:09 PM   Result Value Ref Range    Ventricular Rate 70 BPM Atrial Rate 70 BPM    P-R Interval 168 ms    QRS Duration 98 ms    Q-T Interval 366 ms    QTC Calculation (Bezet) 395 ms    Calculated P Axis 54 degrees    Calculated R Axis 7 degrees    Calculated T Axis 39 degrees    Diagnosis       Normal sinus rhythm  Normal ECG  When compared with ECG of 20-SEP-1997 12:25,  Previous ECG has undetermined rhythm, needs review  QRS axis shifted left  Criteria for Lateral infarct are no longer present  Criteria for Inferior infarct are no longer present  T wave inversion no longer evident in Lateral leads     SAMPLES BEING HELD    Collection Time: 02/24/23 12:24 PM   Result Value Ref Range    SAMPLES BEING HELD 1BLUE,1PST,1SST,1LAV,1RED,1DK GRN     COMMENT        Add-on orders for these samples will be processed based on acceptable specimen integrity and analyte stability, which may vary by analyte. CBC WITH AUTOMATED DIFF    Collection Time: 02/24/23 12:24 PM   Result Value Ref Range    WBC 6.1 4.1 - 11.1 K/uL    RBC 4.49 4. 10 - 5.70 M/uL    HGB 13.3 12.1 - 17.0 g/dL    HCT 40.1 36.6 - 50.3 %    MCV 89.3 80.0 - 99.0 FL    MCH 29.6 26.0 - 34.0 PG    MCHC 33.2 30.0 - 36.5 g/dL    RDW 12.6 11.5 - 14.5 %    PLATELET 844 001 - 876 K/uL    MPV 10.7 8.9 - 12.9 FL    NRBC 0.0 0  WBC    ABSOLUTE NRBC 0.00 0.00 - 0.01 K/uL    NEUTROPHILS 50 32 - 75 %    LYMPHOCYTES 35 12 - 49 %    MONOCYTES 13 5 - 13 %    EOSINOPHILS 2 0 - 7 %    BASOPHILS 0 0 - 1 %    IMMATURE GRANULOCYTES 0 0.0 - 0.5 %    ABS. NEUTROPHILS 3.0 1.8 - 8.0 K/UL    ABS. LYMPHOCYTES 2.1 0.8 - 3.5 K/UL    ABS. MONOCYTES 0.8 0.0 - 1.0 K/UL    ABS. EOSINOPHILS 0.1 0.0 - 0.4 K/UL    ABS. BASOPHILS 0.0 0.0 - 0.1 K/UL    ABS. IMM.  GRANS. 0.0 0.00 - 0.04 K/UL    DF AUTOMATED     METABOLIC PANEL, COMPREHENSIVE    Collection Time: 02/24/23 12:24 PM   Result Value Ref Range    Sodium 138 136 - 145 mmol/L    Potassium 3.9 3.5 - 5.1 mmol/L    Chloride 106 97 - 108 mmol/L    CO2 29 21 - 32 mmol/L    Anion gap 3 (L) 5 - 15 mmol/L Glucose 103 (H) 65 - 100 mg/dL    BUN 17 6 - 20 MG/DL    Creatinine 1.04 0.70 - 1.30 MG/DL    BUN/Creatinine ratio 16 12 - 20      eGFR >60 >60 ml/min/1.73m2    Calcium 9.2 8.5 - 10.1 MG/DL    Bilirubin, total 0.6 0.2 - 1.0 MG/DL    ALT (SGPT) 31 12 - 78 U/L    AST (SGOT) 23 15 - 37 U/L    Alk. phosphatase 59 45 - 117 U/L    Protein, total 7.3 6.4 - 8.2 g/dL    Albumin 4.0 3.5 - 5.0 g/dL    Globulin 3.3 2.0 - 4.0 g/dL    A-G Ratio 1.2 1.1 - 2.2     PROTHROMBIN TIME + INR    Collection Time: 02/24/23 12:24 PM   Result Value Ref Range    INR 1.0 0.9 - 1.1      Prothrombin time 10.6 9.0 - 11.1 sec   PTT    Collection Time: 02/24/23 12:24 PM   Result Value Ref Range    aPTT 27.1 22.1 - 31.0 sec    aPTT, therapeutic range     58.0 - 77.0 SECS   TROPONIN-HIGH SENSITIVITY    Collection Time: 02/24/23 12:24 PM   Result Value Ref Range    Troponin-High Sensitivity 12 0 - 76 ng/L   TROPONIN-HIGH SENSITIVITY    Collection Time: 02/24/23  3:11 PM   Result Value Ref Range    Troponin-High Sensitivity 132 (HH) 0 - 76 ng/L       XR CHEST PA LAT    Result Date: 2/24/2023  INDICATION:  chest pain. EXAM: 2 VIEW CHEST RADIOGRAPH. COMPARISON: None. FINDINGS: Frontal and lateral views of the chest show a moderately large hiatal hernia. No focal infiltrate or effusion. No CHF pattern. . . Minimal bibasilar atelectasis. The heart, mediastinum and pulmonary vasculature are upper normal. The bony thorax is unremarkable for age. ..      Minimal bibasilar atelectasis. Moderately large hiatal hernia. Boom Thurston

## 2023-02-24 NOTE — ED TRIAGE NOTES
Pt c/o onset center chest tightness 30 min pta while getting dressed, non radiating, no other symptoms associated with it, took one bc for the pain, currently pain  is 3-4/10, appears in nad.

## 2023-02-24 NOTE — ED NOTES
Assumed care of the patient from [address] [place] [address]. Patient noted to be on the cardiac monitor, verbalized not happy with the news to be admitted to the hospital, labs reviewed.

## 2023-02-24 NOTE — ED NOTES
Labs sent per cardiology if patient 3rd troponin is less than last hold off on starting a heparin drip. Patient and family updated on plan of care.

## 2023-02-24 NOTE — ED NOTES
Heparin drip started and verified by B[facility] [place], education regarding side effects given to patient and family.

## 2023-02-24 NOTE — ED NOTES
Heparin drip started and verified by Nahum Sexton, education regarding side effects given to patient and family.

## 2023-02-24 NOTE — H&P
Steve Andrade [facility]  [address] [facility], [address] [place] [address]  [phone]    Hospitalist Admission History and Physical      NAME:  Yash Martin   :   1945   MRN:  [id number]     PCP:  None     Date/Time of service:  2023  4:54 PM        Subjective:     CHIEF COMPLAINT: chest pain     HISTORY OF PRESENT ILLNESS:     The patient is a 77 yo hx of HTN, hyperlipidemia, presented w/ chest pain, NSTEMI. The patient c/o \"chest tightness\" today. The pain was non-radiating, not associated with dyspnea or diaphoresis. In the ED, his chest pain has improved. EKG non-ischemic, but Trop increased from 0 to 132. No Known Allergies    Prior to Admission medications    Medication Sig Start Date End Date Taking? Authorizing Provider   rosuvastatin (CRESTOR) 10 mg tablet Take 10 mg by mouth nightly. Yes Other, MD Jana   ezetimibe (ZETIA) 10 mg tablet Take 10 mg by mouth. Yes Other, MD Jana   telmisartan (MICARDIS) 20 mg tablet Take 20 mg by mouth daily. Yes Other, MD Jana       Past Medical History:   Diagnosis Date    Hypercholesterolemia     Hypertension         No past surgical history on file.     Social History     Tobacco Use    Smoking status: Former     Types: Cigarettes    Smokeless tobacco: Never   Substance Use Topics    Alcohol use: Not Currently        Family History   Problem Relation Age of Onset    Heart Disease Father         Review of Systems:  (bold if positive, if negative)    Gen:  Eyes:  ENT:  CVS:  chest pain, Pulm:  GI:  :  MS:  Skin:  Psych:  Endo:  Hem:  Renal:  Neuro:          Objective:      VITALS:    Vital signs reviewed; most recent are:    Visit Vitals  BP (!) 164/59   Pulse 71   Temp 97.9 °F (36.6 °C)   Resp 20   Ht 5' 11\" (1.803 m)   Wt 104.3 kg (230 lb)   SpO2 100%   BMI 32.08 kg/m²     SpO2 Readings from Last 6 Encounters:   23 100%        No intake or output data in the 24 hours ending 23 8307     Exam:     Physical Exam:    Gen:  Well-developed, well-nourished, morbidly obese, in no acute distress  HEENT:  Pink conjunctivae, PERRL, hearing intact to voice, moist mucous membranes  Neck:  Supple, without masses, thyroid non-tender  Resp:  No accessory muscle use, clear breath sounds without wheezes rales or rhonchi  Card:  No murmurs, normal S1, S2 without thrills, bruits or peripheral edema  Abd:  Soft, non-tender, non-distended, normoactive bowel sounds are present  Lymph:  No cervical adenopathy  Musc:  No cyanosis or clubbing  Skin:  No rashes   Neuro:  Cranial nerves 3-12 are grossly intact, follows commands appropriately  Psych:  Alert with good insight. Oriented to person, place, and time    Labs:    Recent Labs     02/24/23  1224   WBC 6.1   HGB 13.3   HCT 40.1        Recent Labs     02/24/23  1224      K 3.9      CO2 29   *   BUN 17   CREA 1.04   CA 9.2   ALB 4.0   TBILI 0.6   ALT 31     No results found for: GLUCPOC  No results for input(s): PH, PCO2, PO2, HCO3, FIO2 in the last 72 hours. Recent Labs     02/24/23  1224   INR 1.0       Radiology and EKG reviewed:   CXR neg    **Prior records, notes, labs, radiology, and medications reviewed in [facility]**       Assessment/Plan:       Principal Problem:    75 yo hx of HTN, hyperlipidemia, presented w/ chest pain, NSTEMI    1) Angina/NSTEMI (non-ST elevated myocardial infarction): EKG non-ischemic, but Trop increased from 0 to 132 in 3 hours. Will monitor on Tele. Check serial enzymes, lipids, A1C, echo. Start ASA, nitro prn, IV morphine prn, heparin gtt. Consult Cards for cath    2) HTN: BP high. Resume home ARB. Use IV labetalol prn    3) Hyperlipidemia: recheck lipids.   Cont statin    Risk of deterioration: high      Total time spent with patient care: 79 Minutes **I personally saw and examined the patient during this time period**                 Care Plan discussed with: Patient, nursing, wife     Discussed:  Care Plan    Prophylaxis:   heparin gtt    Probable Disposition:  Home w/Family           ___________________________________________________    Attending Physician: Ld Ryan MD

## 2023-02-25 ENCOUNTER — APPOINTMENT (OUTPATIENT)
Dept: NON INVASIVE DIAGNOSTICS | Age: 78
DRG: 282 | End: 2023-02-25
Attending: INTERNAL MEDICINE
Payer: MEDICARE

## 2023-02-25 LAB
ALBUMIN SERPL-MCNC: 3.4 G/DL (ref 3.5–5)
ALBUMIN/GLOB SERPL: 1.2 (ref 1.1–2.2)
ALP SERPL-CCNC: 48 U/L (ref 45–117)
ALT SERPL-CCNC: 31 U/L (ref 12–78)
ANION GAP SERPL CALC-SCNC: 4 MMOL/L (ref 5–15)
AST SERPL-CCNC: 32 U/L (ref 15–37)
BILIRUB DIRECT SERPL-MCNC: 0.1 MG/DL (ref 0–0.2)
BILIRUB SERPL-MCNC: 1 MG/DL (ref 0.2–1)
BNP SERPL-MCNC: 89 PG/ML
BUN SERPL-MCNC: 13 MG/DL (ref 6–20)
BUN/CREAT SERPL: 16 (ref 12–20)
CALCIUM SERPL-MCNC: 8.5 MG/DL (ref 8.5–10.1)
CHLORIDE SERPL-SCNC: 106 MMOL/L (ref 97–108)
CHOLEST SERPL-MCNC: 110 MG/DL
CO2 SERPL-SCNC: 28 MMOL/L (ref 21–32)
CREAT SERPL-MCNC: 0.79 MG/DL (ref 0.7–1.3)
ECHO AO ARCH DIAM: 3.3 CM
ECHO AO ASC DIAM: 3.4 CM
ECHO AO ASCENDING AORTA INDEX: 1.52 CM/M2
ECHO AR MAX VEL PISA: 4.9 M/S
ECHO AV AREA PEAK VELOCITY: 0.8 CM2
ECHO AV AREA PLAN: 1.1 CM2
ECHO AV AREA PLAN: 1.1 CM2
ECHO AV AREA VTI: 0.7 CM2
ECHO AV AREA/BSA PEAK VELOCITY: 0.4 CM2/M2
ECHO AV AREA/BSA VTI: 0.3 CM2/M2
ECHO AV MEAN GRADIENT: 31 MMHG
ECHO AV MEAN VELOCITY: 2.7 M/S
ECHO AV PEAK GRADIENT: 52 MMHG
ECHO AV PEAK VELOCITY: 3.6 M/S
ECHO AV REGURGITANT PHT: 339.7 MILLISECOND
ECHO AV VELOCITY RATIO: 0.25
ECHO AV VTI: 93.8 CM
ECHO LA DIAMETER INDEX: 1.61 CM/M2
ECHO LA DIAMETER: 3.6 CM
ECHO LA VOL 2C: 41 ML (ref 18–58)
ECHO LA VOL 4C: 38 ML (ref 18–58)
ECHO LA VOL BP: 40 ML (ref 18–58)
ECHO LA VOL/BSA BIPLANE: 18 ML/M2 (ref 16–34)
ECHO LA VOLUME AREA LENGTH: 43 ML
ECHO LA VOLUME INDEX A2C: 18 ML/M2 (ref 16–34)
ECHO LA VOLUME INDEX A4C: 17 ML/M2 (ref 16–34)
ECHO LA VOLUME INDEX AREA LENGTH: 19 ML/M2 (ref 16–34)
ECHO LV E' LATERAL VELOCITY: 8 CM/S
ECHO LV E' SEPTAL VELOCITY: 9 CM/S
ECHO LV EDV A2C: 103 ML
ECHO LV EDV A4C: 110 ML
ECHO LV EDV BP: 109 ML (ref 67–155)
ECHO LV EDV INDEX A4C: 49 ML/M2
ECHO LV EDV INDEX BP: 49 ML/M2
ECHO LV EDV NDEX A2C: 46 ML/M2
ECHO LV EJECTION FRACTION A2C: 66 %
ECHO LV EJECTION FRACTION A4C: 48 %
ECHO LV EJECTION FRACTION BIPLANE: 56 % (ref 55–100)
ECHO LV ESV A2C: 35 ML
ECHO LV ESV A4C: 57 ML
ECHO LV ESV BP: 48 ML (ref 22–58)
ECHO LV ESV INDEX A2C: 16 ML/M2
ECHO LV ESV INDEX A4C: 25 ML/M2
ECHO LV ESV INDEX BP: 21 ML/M2
ECHO LV FRACTIONAL SHORTENING: 37 % (ref 28–44)
ECHO LV INTERNAL DIMENSION DIASTOLE INDEX: 2.28 CM/M2
ECHO LV INTERNAL DIMENSION DIASTOLIC: 5.1 CM (ref 4.2–5.9)
ECHO LV INTERNAL DIMENSION SYSTOLIC INDEX: 1.43 CM/M2
ECHO LV INTERNAL DIMENSION SYSTOLIC: 3.2 CM
ECHO LV IVSD: 0.9 CM (ref 0.6–1)
ECHO LV MASS 2D: 163.6 G (ref 88–224)
ECHO LV MASS INDEX 2D: 73 G/M2 (ref 49–115)
ECHO LV POSTERIOR WALL DIASTOLIC: 0.9 CM (ref 0.6–1)
ECHO LV RELATIVE WALL THICKNESS RATIO: 0.35
ECHO LVOT AREA: 3.5 CM2
ECHO LVOT AV VTI INDEX: 0.21
ECHO LVOT DIAM: 2.1 CM
ECHO LVOT MEAN GRADIENT: 2 MMHG
ECHO LVOT PEAK GRADIENT: 3 MMHG
ECHO LVOT PEAK VELOCITY: 0.9 M/S
ECHO LVOT STROKE VOLUME INDEX: 30.1 ML/M2
ECHO LVOT SV: 67.5 ML
ECHO LVOT VTI: 19.5 CM
ECHO MV A VELOCITY: 0.93 M/S
ECHO MV E DECELERATION TIME (DT): 251.9 MS
ECHO MV E VELOCITY: 0.77 M/S
ECHO MV E/A RATIO: 0.83
ECHO MV E/E' LATERAL: 9.63
ECHO MV E/E' RATIO (AVERAGED): 9.09
ECHO MV E/E' SEPTAL: 8.56
ECHO MV REGURGITANT PEAK GRADIENT: 144 MMHG
ECHO MV REGURGITANT PEAK VELOCITY: 6 M/S
ECHO PULMONARY ARTERY END DIASTOLIC PRESSURE: 6 MMHG
ECHO PV MAX VELOCITY: 1.2 M/S
ECHO PV PEAK GRADIENT: 6 MMHG
ECHO PV REGURGITANT MAX VELOCITY: 1.2 M/S
ECHO RV FREE WALL PEAK S': 14 CM/S
ECHO RV INTERNAL DIMENSION: 3.8 CM
ECHO RV TAPSE: 2.5 CM (ref 1.7–?)
ECHO TV REGURGITANT MAX VELOCITY: 2.63 M/S
ECHO TV REGURGITANT PEAK GRADIENT: 28 MMHG
ERYTHROCYTE [DISTWIDTH] IN BLOOD BY AUTOMATED COUNT: 12.5 % (ref 11.5–14.5)
EST. AVERAGE GLUCOSE BLD GHB EST-MCNC: 114 MG/DL
GLOBULIN SER CALC-MCNC: 2.8 G/DL (ref 2–4)
GLUCOSE SERPL-MCNC: 97 MG/DL (ref 65–100)
HBA1C MFR BLD: 5.6 % (ref 4–5.6)
HCT VFR BLD AUTO: 36.9 % (ref 36.6–50.3)
HDLC SERPL-MCNC: 52 MG/DL
HDLC SERPL: 2.1 (ref 0–5)
HGB BLD-MCNC: 12.1 G/DL (ref 12.1–17)
LDLC SERPL CALC-MCNC: 44.2 MG/DL (ref 0–100)
MAGNESIUM SERPL-MCNC: 1.9 MG/DL (ref 1.6–2.4)
MCH RBC QN AUTO: 29.2 PG (ref 26–34)
MCHC RBC AUTO-ENTMCNC: 32.8 G/DL (ref 30–36.5)
MCV RBC AUTO: 88.9 FL (ref 80–99)
NRBC # BLD: 0 K/UL (ref 0–0.01)
NRBC BLD-RTO: 0 PER 100 WBC
PHOSPHATE SERPL-MCNC: 3.4 MG/DL (ref 2.6–4.7)
PLATELET # BLD AUTO: 146 K/UL (ref 150–400)
PMV BLD AUTO: 10.4 FL (ref 8.9–12.9)
POTASSIUM SERPL-SCNC: 4 MMOL/L (ref 3.5–5.1)
PROT SERPL-MCNC: 6.2 G/DL (ref 6.4–8.2)
RBC # BLD AUTO: 4.15 M/UL (ref 4.1–5.7)
SODIUM SERPL-SCNC: 138 MMOL/L (ref 136–145)
TRIGL SERPL-MCNC: 69 MG/DL (ref ?–150)
TROPONIN I SERPL HS-MCNC: 2628 NG/L (ref 0–76)
TROPONIN I SERPL HS-MCNC: 3777 NG/L (ref 0–76)
UFH PPP CHRO-ACNC: 0.46 IU/ML
UFH PPP CHRO-ACNC: 0.56 IU/ML
UFH PPP CHRO-ACNC: 0.58 IU/ML
VLDLC SERPL CALC-MCNC: 13.8 MG/DL
WBC # BLD AUTO: 6 K/UL (ref 4.1–11.1)

## 2023-02-25 PROCEDURE — 36415 COLL VENOUS BLD VENIPUNCTURE: CPT

## 2023-02-25 PROCEDURE — 93306 TTE W/DOPPLER COMPLETE: CPT | Performed by: INTERNAL MEDICINE

## 2023-02-25 PROCEDURE — 83036 HEMOGLOBIN GLYCOSYLATED A1C: CPT

## 2023-02-25 PROCEDURE — 85027 COMPLETE CBC AUTOMATED: CPT

## 2023-02-25 PROCEDURE — 93306 TTE W/DOPPLER COMPLETE: CPT

## 2023-02-25 PROCEDURE — 99233 SBSQ HOSP IP/OBS HIGH 50: CPT | Performed by: INTERNAL MEDICINE

## 2023-02-25 PROCEDURE — 74011250637 HC RX REV CODE- 250/637: Performed by: INTERNAL MEDICINE

## 2023-02-25 PROCEDURE — 84484 ASSAY OF TROPONIN QUANT: CPT

## 2023-02-25 PROCEDURE — 85520 HEPARIN ASSAY: CPT

## 2023-02-25 PROCEDURE — 74011000250 HC RX REV CODE- 250: Performed by: INTERNAL MEDICINE

## 2023-02-25 PROCEDURE — 83735 ASSAY OF MAGNESIUM: CPT

## 2023-02-25 PROCEDURE — 80061 LIPID PANEL: CPT

## 2023-02-25 PROCEDURE — 65270000046 HC RM TELEMETRY

## 2023-02-25 PROCEDURE — 83880 ASSAY OF NATRIURETIC PEPTIDE: CPT

## 2023-02-25 PROCEDURE — 74011250636 HC RX REV CODE- 250/636: Performed by: INTERNAL MEDICINE

## 2023-02-25 PROCEDURE — 93005 ELECTROCARDIOGRAM TRACING: CPT

## 2023-02-25 PROCEDURE — 84100 ASSAY OF PHOSPHORUS: CPT

## 2023-02-25 PROCEDURE — 80048 BASIC METABOLIC PNL TOTAL CA: CPT

## 2023-02-25 PROCEDURE — 80076 HEPATIC FUNCTION PANEL: CPT

## 2023-02-25 RX ORDER — ROSUVASTATIN CALCIUM 10 MG/1
20 TABLET, COATED ORAL
Status: DISCONTINUED | OUTPATIENT
Start: 2023-02-25 | End: 2023-02-28 | Stop reason: HOSPADM

## 2023-02-25 RX ADMIN — SODIUM CHLORIDE, PRESERVATIVE FREE 10 ML: 5 INJECTION INTRAVENOUS at 20:35

## 2023-02-25 RX ADMIN — SODIUM CHLORIDE, PRESERVATIVE FREE 10 ML: 5 INJECTION INTRAVENOUS at 08:17

## 2023-02-25 RX ADMIN — ASPIRIN 81 MG: 81 TABLET, CHEWABLE ORAL at 08:17

## 2023-02-25 RX ADMIN — EZETIMIBE 10 MG: 10 TABLET ORAL at 08:17

## 2023-02-25 RX ADMIN — ROSUVASTATIN CALCIUM 20 MG: 10 TABLET, COATED ORAL at 21:10

## 2023-02-25 RX ADMIN — LOSARTAN POTASSIUM 50 MG: 50 TABLET, FILM COATED ORAL at 20:35

## 2023-02-25 RX ADMIN — HEPARIN SODIUM 9 UNITS/KG/HR: 10000 INJECTION, SOLUTION INTRAVENOUS at 20:38

## 2023-02-25 NOTE — ED NOTES
Bedside and Verbal shift change report given to Vernon Graves RN (oncoming nurse) by Ying Sparks RN (offgoing nurse). Report included the following information SBAR, ED Summary, and MAR.

## 2023-02-25 NOTE — ED NOTES
Report given to [employer]. Care signed off the patient. Heparin drip infusing and verified, no signs of bleeding noted, morning labs sent.

## 2023-02-25 NOTE — PROGRESS NOTES
Steve Andrade [facility]  [address] [facility], [address] [place] [address]  [phone]      Medical Progress Note      NAME: Arleth Putnam   :  1945  MRM:  [id number]    Date/Time: 2023  2:05 PM           Assessment / Plan:     75 yo hx of HTN, hyperlipidemia, presented w/ chest pain, NSTEMI:    NSTEMI  Presented with chest pain and serially upward trending troponin consistent with NSTEMI. Patient seen and evaluated at bedside this morning reporting no further chest tightness. Has been started on heparin drip which will be continued; continue to monitor APTT as well as hemoglobin while on heparin drip. Patient was assessed in the emergency room with cardiology; discussed with cardiology afterwards with plans for coronary angiogram given rapidly increase in troponin. Reviewed echocardiogram with cardiology and noted moderate aortic stenosis. .. Unclear at this point whether patient experienced increased demand which reflected into bump troponin. Nonetheless coronary angiogram will further risk stratify on whether patient has any coronary artery disease. We will further optimize medications post cath regards to antiplatelet, beta-blocker, and statin therapy. Essential hypertension  Monitor and titrate medications accordingly. Hyperlipidemia  Reviewed lipid profile. Home statin therapy. Updated wife at bedside. Total time spent on critical care 35 minutes. Care Plan discussed with: Patient, Family, and Consultant/Specialist    Prophylaxis: Heparin drip. Disposition:  Home w/Family           ___________________________________________________    Attending Physician: Nehemias Lowe MD        Subjective:     Chief Complaint: Lying comfortably in bed. No current chest tightness or chest pain. No current nausea or vomiting or diaphoresis. Wife at bedside. ROS:  Rest of review of systems negative otherwise.         Objective:       Vitals:     Last 24hrs VS reviewed since prior progress note. Most recent are:    Visit Vitals  BP (!) 142/85   Pulse 67   Temp 97.9 °F (36.6 °C)   Resp 21   Ht 5' 11\" (1.803 m)   Wt 104.3 kg (229 lb 15 oz)   SpO2 96%   BMI 32.07 kg/m²     SpO2 Readings from Last 6 Encounters:   02/25/23 96%          Intake/Output Summary (Last 24 hours) at 2/25/2023 1405  Last data filed at 2/25/2023 0730  Gross per 24 hour   Intake 963.71 ml   Output --   Net 963.71 ml          Exam:     Physical Exam:    Gen:  No acute distress. HEENT:  NC/AT. Pink conjunctivae, hearing intact to voice, moist mucous membranes. Neck:  Supple. Resp:  Unlabored breathing. Clear breath sounds with good air entry. No wheezes rales or rhonchi. Card:  Regular rate and rhythm. Normal S1 and S2. No murmurs. Abd:  Soft, non-tender, non-distended, normoactive bowel sounds. Ext: No clubbing, cyanosis or edema. Skin:  No rashes or ulcers, skin turgor is good. Neuro:  Moving all extremities with no gross focal deficits appreciated. Psych:  Good insight, oriented to person, place and time, alert.     Medications Reviewed: (see below)    Lab Data Reviewed: (see below)  ______________________________________________________________________    Medications:     Current Facility-Administered Medications   Medication Dose Route Frequency    rosuvastatin (CRESTOR) tablet 20 mg  20 mg Oral QHS    nitroglycerin (NITROSTAT) tablet 0.4 mg  0.4 mg SubLINGual Q5MIN PRN    morphine injection 2 mg  2 mg IntraVENous Q4H PRN    aspirin chewable tablet 81 mg  81 mg Oral DAILY    heparin 25,000 units in D5W 250 ml infusion  9-25 Units/kg/hr IntraVENous TITRATE    labetaloL (NORMODYNE;TRANDATE) 20 mg/4 mL (5 mg/mL) injection 20 mg  20 mg IntraVENous Q4H PRN    ezetimibe (ZETIA) tablet 10 mg  10 mg Oral DAILY    losartan (COZAAR) tablet 50 mg  50 mg Oral QHS    0.9% sodium chloride infusion  50 mL/hr IntraVENous CONTINUOUS    sodium chloride (NS) flush 5-40 mL  5-40 mL IntraVENous Q8H sodium chloride (NS) flush 5-40 mL  5-40 mL IntraVENous PRN    0.9% sodium chloride infusion 25 mL  25 mL IntraVENous PRN    acetaminophen (TYLENOL) tablet 650 mg  650 mg Oral Q6H PRN    Or    acetaminophen (TYLENOL) suppository 650 mg  650 mg Rectal Q6H PRN    bisacodyL (DULCOLAX) suppository 10 mg  10 mg Rectal DAILY PRN    promethazine (PHENERGAN) tablet 12.5 mg  12.5 mg Oral Q6H PRN    Or    ondansetron (ZOFRAN) injection 4 mg  4 mg IntraVENous Q6H PRN     Current Outpatient Medications   Medication Sig    rosuvastatin (CRESTOR) 10 mg tablet Take 10 mg by mouth nightly.    ezetimibe (ZETIA) 10 mg tablet Take 10 mg by mouth. telmisartan (MICARDIS) 20 mg tablet Take 20 mg by mouth daily.         Lab Review:     Recent Labs     02/25/23  0228 02/24/23  1653 02/24/23  1224   WBC 6.0 7.2 6.1   HGB 12.1 13.0 13.3   HCT 36.9 38.5 40.1   * 172 178     Recent Labs     02/25/23  0228 02/24/23  1224    138   K 4.0 3.9    106   CO2 28 29   GLU 97 103*   BUN 13 17   CREA 0.79 1.04   CA 8.5 9.2   MG 1.9  --    PHOS 3.4  --    ALB 3.4* 4.0   ALT 31 31   INR  --  1.0     No components found for: [place]

## 2023-02-25 NOTE — ED NOTES
Bedside and Verbal shift change report given to Billy Severino RN (oncoming nurse) by Mendel Hull RN (offgoing nurse). Report included the following information SBAR, ED Summary, and MAR.

## 2023-02-25 NOTE — PROGRESS NOTES
[facility]                    Cardiology Care Note     []Initial Encounter     [x]Follow-up    Patient Name: Arleth Putnam - RN - EZI:[id number]  Primary Cardiologist: none   Consulting Cardiologist: [facility] Physicians: Ryan Watters MD     Reason for encounter: CP    HPI:       Arleth Putnam is a 76 y.o. male with PMH significant for HTN and HLD    Pt reports earlier today he felt a tightness in his chest and discomfort. Nothing seemed to aggravate or relieve the pain. Became concerned so came to the ED. He denies any palpitations, SOB, edema. No prior cardiac hx. Reports his father had \"heart problems\" but did not have a heart attack. Trop mildly elevated since admission, chest xray with hiatal hernia which pt was unaware of. Subjective:      Arleth Putnam reports no further CP, SOB, palpitations. Assessment and Plan     NSTEMI  Presented with chest tightness. EKG non-ischemic. However, his tropnins was significantly elevated this AM and continues to trend upward (12-->132-->360-->3,777). His risk factors include HTN, HLD, and obesity.  - given significant Trops elevated, would warrant further ischemic evaluation  - Cont Heparin gtt per ACS protolol  - cont ASA 81 mg daily  - increase Crestor to 20 mg daily  - cont Losartan, keep BP<130/90  - fu on final TTE results, prelim read demonstrated EF around 55% with evidence of moderate AS  - NPO after midnight on  for C catheterization on Monday    CP:   could be partially caused by mod large hiatal hernia.  Ruled in with Troponins.  - management as noted above    Aortic stenosis  - FU with final TTE read    Hiatal hernia: mod large seen on chest xray    HTN: on micardis PTA     HLD: on crestor , Zetia          ____________________________________________________________    Cardiac testing    N/a     Most recent HS troponins:  Recent Labs 02/24/23  1705 02/24/23  1511 02/24/23  1224   TROPHS 360* 132* 12       ECG: normal sinus rhythm    Review of Systems:    [x]All other systems reviewed and all negative except as written in HPI    [] Patient unable to provide secondary to condition    Past Medical History:   Diagnosis Date    Hypercholesterolemia     Hypertension      No past surgical history on file. Social Hx:  reports that he has quit smoking. His smoking use included cigarettes. He has never used smokeless tobacco. He reports that he does not currently use alcohol. Family Hx: family history includes Heart Disease in his father. No Known Allergies       OBJECTIVE:  Wt Readings from Last 3 Encounters:   02/24/23 230 lb (104.3 kg)       Intake/Output Summary (Last 24 hours) at 2/24/2023 2131  Last data filed at 2/24/2023 2123  Gross per 24 hour   Intake 300 ml   Output --   Net 300 ml       Physical Exam:    Vitals:   Vitals:    02/24/23 1853 02/24/23 2000 02/24/23 2053 02/24/23 2055   BP: (!) 130/106 (!) 113/38 (!) 136/48    Pulse: 64 66 61 65   Resp: 17 17 19 20   Temp:       SpO2: 95% 92% 95% 95%   Weight:       Height:         Telemetry: normal sinus rhythm    Gen: Well-developed, well-nourished, in no acute distress  Neck: Supple, No JVD, No Carotid Bruit  Resp: No accessory muscle use, Clear breath sounds, No rales or rhonchi  Card: Regular Rate,Rythm, Normal S1, S2, 2/6 murmurs, no rubs or gallop. No thrills. Abd:   Soft, non-tender, non-distended, BS+   MSK: No cyanosis  Skin: No rashes    Neuro: Moving all four extremities, follows commands appropriately  Psych: Good insight, oriented to person, place, alert, Nml Affect  LE: No edema    Data Review:     Radiology:   XR Results (most recent):  Results from Hospital Encounter encounter on 02/24/23    XR CHEST PA LAT    Narrative  INDICATION:  chest pain. EXAM: 2 VIEW CHEST RADIOGRAPH. COMPARISON: None.     FINDINGS: Frontal and lateral views of the chest show a moderately large hiatal  hernia. No focal infiltrate or effusion. No CHF pattern. . . Minimal bibasilar  atelectasis. The heart, mediastinum and pulmonary vasculature are upper normal.  The bony thorax is unremarkable for age. ..    Impression  Minimal bibasilar atelectasis. Moderately large hiatal hernia. .      Recent Labs     02/24/23  1224      K 3.9      CO2 29   BUN 17   CREA 1.04   *   CA 9.2     Recent Labs     02/24/23  1653 02/24/23  1224   WBC 7.2 6.1   HGB 13.0 13.3   HCT 38.5 40.1    178     Recent Labs     02/24/23  1224   PTP 10.6   INR 1.0   AP 59     No results for input(s): CHOL, LDLC in the last 72 hours.     No lab exists for component: TGL, HDLC,  HBA1C      Current meds:    Current Facility-Administered Medications:     nitroglycerin (NITROSTAT) tablet 0.4 mg, 0.4 mg, SubLINGual, Q5MIN PRN, Jame Irving MD, 0.4 mg at 02/24/23 1240    morphine injection 2 mg, 2 mg, IntraVENous, Q4H PRN, Jame Irving MD    [START ON 2/25/2023] aspirin chewable tablet 81 mg, 81 mg, Oral, DAILY, Jame Irving MD    heparin 25,000 units in D5W 250 ml infusion, 9-25 Units/kg/hr, IntraVENous, TITRATE, Jame Irving MD, Last Rate: 9.4 mL/hr at 02/24/23 1831, 9 Units/kg/hr at 02/24/23 1831    labetaloL (NORMODYNE;TRANDATE) 20 mg/4 mL (5 mg/mL) injection 20 mg, 20 mg, IntraVENous, Q4H PRN, Jose F Irving MD    [START ON 2/25/2023] ezetimibe (ZETIA) tablet 10 mg, 10 mg, Oral, DAILY, Jose F Irving MD    rosuvastatin (CRESTOR) tablet 10 mg, 10 mg, Oral, QHS, Jose F Irving MD    losartan (COZAAR) tablet 50 mg, 50 mg, Oral, QHS, Jose F Irving MD    0.9% sodium chloride infusion, 50 mL/hr, IntraVENous, CONTINUOUS, Jose F Irving MD, Last Rate: 50 mL/hr at 02/24/23 2040, 50 mL/hr at 02/24/23 2040    sodium chloride (NS) flush 5-40 mL, 5-40 mL, IntraVENous, Q8H, Jame Irving MD, 10 mL at 02/24/23 1834    sodium chloride (NS) flush 5-40 mL, 5-40 mL, IntraVENous, PRN, Jose F Irving MD    0.9% sodium chloride infusion 25 mL, 25 mL, IntraVENous, PRN, Jame Irving MD    acetaminophen (TYLENOL) tablet 650 mg, 650 mg, Oral, Q6H PRN **OR** acetaminophen (TYLENOL) suppository 650 mg, 650 mg, Rectal, Q6H PRN, Jame Irving MD    bisacodyL (DULCOLAX) suppository 10 mg, 10 mg, Rectal, DAILY PRN, Jame Irving MD    promethazine (PHENERGAN) tablet 12.5 mg, 12.5 mg, Oral, Q6H PRN **OR** ondansetron (ZOFRAN) injection 4 mg, 4 mg, IntraVENous, Q6H PRN, Jame Irving MD    Current Outpatient Medications:     rosuvastatin (CRESTOR) 10 mg tablet, Take 10 mg by mouth nightly., Disp: , Rfl:     ezetimibe (ZETIA) 10 mg tablet, Take 10 mg by mouth., Disp: , Rfl:     telmisartan (MICARDIS) 20 mg tablet, Take 20 mg by mouth daily. , Disp: , Rfl:     Keri Paris MD    [facility] Cardiology  Call center: W) [phone]  (W) [phone]      CC:None

## 2023-02-25 NOTE — ED NOTES
Report given to [employer] Care signed off the patient. Heparin drip infusing and verified, no signs of bleeding noted, morning labs sent.

## 2023-02-25 NOTE — PROGRESS NOTES
[facility]                    Cardiology Care Note     []Initial Encounter     [x]Follow-up    Patient Name: Rachel Patel - U:[id number] - ANE:[id number]  Primary Cardiologist: none   Consulting Cardiologist: [facility] Physicians: Lark Kanner MD     Reason for encounter: CP    HPI:       Rachel Patle is a 76 y.o. male with PMH significant for HTN and HLD    Pt reports earlier today he felt a tightness in his chest and discomfort. Nothing seemed to aggravate or relieve the pain. Became concerned so came to the ED. He denies any palpitations, SOB, edema. No prior cardiac hx. Reports his father had \"heart problems\" but did not have a heart attack. Trop mildly elevated since admission, chest xray with hiatal hernia which pt was unaware of. Subjective:      Rachel Patel reports no further CP, SOB, palpitations. Assessment and Plan     NSTEMI  Presented with chest tightness. EKG non-ischemic. However, his tropnins was significantly elevated this AM and continues to trend upward (12-->132-->360-->3,777). His risk factors include HTN, HLD, and obesity.  - given significant Trops elevated, would warrant further ischemic evaluation  - Cont Heparin gtt per ACS protolol  - cont ASA 81 mg daily  - increase Crestor to 20 mg daily  - cont Losartan, keep BP<130/90  - fu on final TTE results, prelim read demonstrated EF around 55% with evidence of moderate AS  - NPO after midnight on Sunday for C catheterization on Monday    CP:   could be partially caused by mod large hiatal hernia.  Ruled in with Troponins.  - management as noted above    Aortic stenosis  - FU with final TTE read    Hiatal hernia: mod large seen on chest xray    HTN: on micardis PTA     HLD: on crestor , Zetia          ____________________________________________________________    Cardiac testing    N/a     Most recent HS troponins:  Recent Labs 02/24/23  1705 02/24/23  1511 02/24/23  1224   TROPHS 360* 132* 12       ECG: normal sinus rhythm    Review of Systems:    [x]All other systems reviewed and all negative except as written in HPI    [] Patient unable to provide secondary to condition    Past Medical History:   Diagnosis Date    Hypercholesterolemia     Hypertension      No past surgical history on file. Social Hx:  reports that he has quit smoking. His smoking use included cigarettes. He has never used smokeless tobacco. He reports that he does not currently use alcohol. Family Hx: family history includes Heart Disease in his father. No Known Allergies       OBJECTIVE:  Wt Readings from Last 3 Encounters:   02/24/23 230 lb (104.3 kg)       Intake/Output Summary (Last 24 hours) at 2/24/2023 2131  Last data filed at 2/24/2023 2123  Gross per 24 hour   Intake 300 ml   Output --   Net 300 ml       Physical Exam:    Vitals:   Vitals:    02/24/23 1853 02/24/23 2000 02/24/23 2053 02/24/23 2055   BP: (!) 130/106 (!) 113/38 (!) 136/48    Pulse: 64 66 61 65   Resp: 17 17 19 20   Temp:       SpO2: 95% 92% 95% 95%   Weight:       Height:         Telemetry: normal sinus rhythm    Gen: Well-developed, well-nourished, in no acute distress  Neck: Supple, No JVD, No Carotid Bruit  Resp: No accessory muscle use, Clear breath sounds, No rales or rhonchi  Card: Regular Rate,Rythm, Normal S1, S2, 2/6 murmurs, no rubs or gallop. No thrills. Abd:   Soft, non-tender, non-distended, BS+   MSK: No cyanosis  Skin: No rashes    Neuro: Moving all four extremities, follows commands appropriately  Psych: Good insight, oriented to person, place, alert, Nml Affect  LE: No edema    Data Review:     Radiology:   XR Results (most recent):  Results from Hospital Encounter encounter on 02/24/23    XR CHEST PA LAT    Narrative  INDICATION:  chest pain. EXAM: 2 VIEW CHEST RADIOGRAPH. COMPARISON: None.     FINDINGS: Frontal and lateral views of the chest show a moderately large hiatal  hernia. No focal infiltrate or effusion. No CHF pattern. . . Minimal bibasilar  atelectasis. The heart, mediastinum and pulmonary vasculature are upper normal.  The bony thorax is unremarkable for age. ..    Impression  Minimal bibasilar atelectasis. Moderately large hiatal hernia. .      Recent Labs     02/24/23  1224      K 3.9      CO2 29   BUN 17   CREA 1.04   *   CA 9.2     Recent Labs     02/24/23  1653 02/24/23  1224   WBC 7.2 6.1   HGB 13.0 13.3   HCT 38.5 40.1    178     Recent Labs     02/24/23  1224   PTP 10.6   INR 1.0   AP 59     No results for input(s): CHOL, LDLC in the last 72 hours.     No lab exists for component: TGL, HDLC,  HBA1C      Current meds:    Current Facility-Administered Medications:     nitroglycerin (NITROSTAT) tablet 0.4 mg, 0.4 mg, SubLINGual, Q5MIN PRN, Jame Irving MD, 0.4 mg at 02/24/23 1240    morphine injection 2 mg, 2 mg, IntraVENous, Q4H PRN, Jame Irving MD    [START ON 2/25/2023] aspirin chewable tablet 81 mg, 81 mg, Oral, DAILY, Jame Irving MD    heparin 25,000 units in D5W 250 ml infusion, 9-25 Units/kg/hr, IntraVENous, TITRATE, Jame Irving MD, Last Rate: 9.4 mL/hr at 02/24/23 1831, 9 Units/kg/hr at 02/24/23 1831    labetaloL (NORMODYNE;TRANDATE) 20 mg/4 mL (5 mg/mL) injection 20 mg, 20 mg, IntraVENous, Q4H PRN, Isabel Irving MD    [START ON 2/25/2023] ezetimibe (ZETIA) tablet 10 mg, 10 mg, Oral, DAILY, Isabel Irving MD    rosuvastatin (CRESTOR) tablet 10 mg, 10 mg, Oral, QHS, Isabel Irving MD    losartan (COZAAR) tablet 50 mg, 50 mg, Oral, QHS, Isabel Irving MD    0.9% sodium chloride infusion, 50 mL/hr, IntraVENous, CONTINUOUS, Isaebl Irving MD, Last Rate: 50 mL/hr at 02/24/23 2040, 50 mL/hr at 02/24/23 2040    sodium chloride (NS) flush 5-40 mL, 5-40 mL, IntraVENous, Q8H, Jame Irving MD, 10 mL at 02/24/23 1834    sodium chloride (NS) flush 5-40 mL, 5-40 mL, IntraVENous, PRN, Isabel Irving MD    0.9% sodium chloride infusion 25 mL, 25 mL, IntraVENous, PRN, Jame Irving MD    acetaminophen (TYLENOL) tablet 650 mg, 650 mg, Oral, Q6H PRN **OR** acetaminophen (TYLENOL) suppository 650 mg, 650 mg, Rectal, Q6H PRN, Jame Irving MD    bisacodyL (DULCOLAX) suppository 10 mg, 10 mg, Rectal, DAILY PRN, Jame Irving MD    promethazine (PHENERGAN) tablet 12.5 mg, 12.5 mg, Oral, Q6H PRN **OR** ondansetron (ZOFRAN) injection 4 mg, 4 mg, IntraVENous, Q6H PRN, Jame Irving MD    Current Outpatient Medications:     rosuvastatin (CRESTOR) 10 mg tablet, Take 10 mg by mouth nightly., Disp: , Rfl:     ezetimibe (ZETIA) 10 mg tablet, Take 10 mg by mouth., Disp: , Rfl:     telmisartan (MICARDIS) 20 mg tablet, Take 20 mg by mouth daily. , Disp: , Rfl:     Keri Juarez MD    [facility] Cardiology  Call center: I) [phone] (W) [phone]      CC:None

## 2023-02-25 NOTE — PROGRESS NOTES
Steve Andrade [facility]  [address] [facility], [address] [place] [address]  [phone]      Medical Progress Note      NAME: Jona Salazar   :  1945  MRM:  [id number]    Date/Time: 2023  2:05 PM           Assessment / Plan:     75 yo hx of HTN, hyperlipidemia, presented w/ chest pain, NSTEMI:    NSTEMI  Presented with chest pain and serially upward trending troponin consistent with NSTEMI. Patient seen and evaluated at bedside this morning reporting no further chest tightness. Has been started on heparin drip which will be continued; continue to monitor APTT as well as hemoglobin while on heparin drip. Patient was assessed in the emergency room with cardiology; discussed with cardiology afterwards with plans for coronary angiogram given rapidly increase in troponin. Reviewed echocardiogram with cardiology and noted moderate aortic stenosis. .. Unclear at this point whether patient experienced increased demand which reflected into bump troponin. Nonetheless coronary angiogram will further risk stratify on whether patient has any coronary artery disease. We will further optimize medications post cath regards to antiplatelet, beta-blocker, and statin therapy. Essential hypertension  Monitor and titrate medications accordingly. Hyperlipidemia  Reviewed lipid profile. Home statin therapy. Updated wife at bedside. Total time spent on critical care 35 minutes. Care Plan discussed with: Patient, Family, and Consultant/Specialist    Prophylaxis: Heparin drip. Disposition:  Home w/Family           ___________________________________________________    Attending Physician: Margarita Ndiaye MD        Subjective:     Chief Complaint: Lying comfortably in bed. No current chest tightness or chest pain. No current nausea or vomiting or diaphoresis. Wife at bedside. ROS:  Rest of review of systems negative otherwise.         Objective:       Vitals:     Last 24hrs VS reviewed since prior progress note. Most recent are:    Visit Vitals  BP (!) 142/85   Pulse 67   Temp 97.9 °F (36.6 °C)   Resp 21   Ht 5' 11\" (1.803 m)   Wt 104.3 kg (229 lb 15 oz)   SpO2 96%   BMI 32.07 kg/m²     SpO2 Readings from Last 6 Encounters:   02/25/23 96%          Intake/Output Summary (Last 24 hours) at 2/25/2023 1405  Last data filed at 2/25/2023 0730  Gross per 24 hour   Intake 963.71 ml   Output --   Net 963.71 ml          Exam:     Physical Exam:    Gen:  No acute distress. HEENT:  NC/AT. Pink conjunctivae, hearing intact to voice, moist mucous membranes. Neck:  Supple. Resp:  Unlabored breathing. Clear breath sounds with good air entry. No wheezes rales or rhonchi. Card:  Regular rate and rhythm. Normal S1 and S2. No murmurs. Abd:  Soft, non-tender, non-distended, normoactive bowel sounds. Ext: No clubbing, cyanosis or edema. Skin:  No rashes or ulcers, skin turgor is good. Neuro:  Moving all extremities with no gross focal deficits appreciated. Psych:  Good insight, oriented to person, place and time, alert.     Medications Reviewed: (see below)    Lab Data Reviewed: (see below)  ______________________________________________________________________    Medications:     Current Facility-Administered Medications   Medication Dose Route Frequency    rosuvastatin (CRESTOR) tablet 20 mg  20 mg Oral QHS    nitroglycerin (NITROSTAT) tablet 0.4 mg  0.4 mg SubLINGual Q5MIN PRN    morphine injection 2 mg  2 mg IntraVENous Q4H PRN    aspirin chewable tablet 81 mg  81 mg Oral DAILY    heparin 25,000 units in D5W 250 ml infusion  9-25 Units/kg/hr IntraVENous TITRATE    labetaloL (NORMODYNE;TRANDATE) 20 mg/4 mL (5 mg/mL) injection 20 mg  20 mg IntraVENous Q4H PRN    ezetimibe (ZETIA) tablet 10 mg  10 mg Oral DAILY    losartan (COZAAR) tablet 50 mg  50 mg Oral QHS    0.9% sodium chloride infusion  50 mL/hr IntraVENous CONTINUOUS    sodium chloride (NS) flush 5-40 mL  5-40 mL IntraVENous Q8H sodium chloride (NS) flush 5-40 mL  5-40 mL IntraVENous PRN    0.9% sodium chloride infusion 25 mL  25 mL IntraVENous PRN    acetaminophen (TYLENOL) tablet 650 mg  650 mg Oral Q6H PRN    Or    acetaminophen (TYLENOL) suppository 650 mg  650 mg Rectal Q6H PRN    bisacodyL (DULCOLAX) suppository 10 mg  10 mg Rectal DAILY PRN    promethazine (PHENERGAN) tablet 12.5 mg  12.5 mg Oral Q6H PRN    Or    ondansetron (ZOFRAN) injection 4 mg  4 mg IntraVENous Q6H PRN     Current Outpatient Medications   Medication Sig    rosuvastatin (CRESTOR) 10 mg tablet Take 10 mg by mouth nightly.    ezetimibe (ZETIA) 10 mg tablet Take 10 mg by mouth. telmisartan (MICARDIS) 20 mg tablet Take 20 mg by mouth daily.         Lab Review:     Recent Labs     02/25/23  0228 02/24/23  1653 02/24/23  1224   WBC 6.0 7.2 6.1   HGB 12.1 13.0 13.3   HCT 36.9 38.5 40.1   * 172 178     Recent Labs     02/25/23  0228 02/24/23  1224    138   K 4.0 3.9    106   CO2 28 29   GLU 97 103*   BUN 13 17   CREA 0.79 1.04   CA 8.5 9.2   MG 1.9  --    PHOS 3.4  --    ALB 3.4* 4.0   ALT 31 31   INR  --  1.0     No components found for: [place]

## 2023-02-25 NOTE — PROGRESS NOTES
Patient noted to have increase troponin numbers, continue to monitor labs. Patient started on heparin drip, risk for bleeding discuss with patient and family, education given. Patient on continuous cardiac monitoring, encourage to report any symptom of chest pain to care team immediately.

## 2023-02-26 LAB
ATRIAL RATE: 64 BPM
ATRIAL RATE: 70 BPM
BASOPHILS # BLD: 0 K/UL (ref 0–0.1)
BASOPHILS NFR BLD: 0 % (ref 0–1)
CALCULATED P AXIS, ECG09: 54 DEGREES
CALCULATED P AXIS, ECG09: 63 DEGREES
CALCULATED R AXIS, ECG10: -5 DEGREES
CALCULATED R AXIS, ECG10: 7 DEGREES
CALCULATED T AXIS, ECG11: 28 DEGREES
CALCULATED T AXIS, ECG11: 39 DEGREES
COMMENT, HOLDF: NORMAL
DIAGNOSIS, 93000: NORMAL
DIAGNOSIS, 93000: NORMAL
DIFFERENTIAL METHOD BLD: ABNORMAL
EOSINOPHIL # BLD: 0.1 K/UL (ref 0–0.4)
EOSINOPHIL NFR BLD: 2 % (ref 0–7)
ERYTHROCYTE [DISTWIDTH] IN BLOOD BY AUTOMATED COUNT: 12.7 % (ref 11.5–14.5)
HCT VFR BLD AUTO: 36.8 % (ref 36.6–50.3)
HGB BLD-MCNC: 11.9 G/DL (ref 12.1–17)
IMM GRANULOCYTES # BLD AUTO: 0 K/UL (ref 0–0.04)
IMM GRANULOCYTES NFR BLD AUTO: 0 % (ref 0–0.5)
LYMPHOCYTES # BLD: 1.8 K/UL (ref 0.8–3.5)
LYMPHOCYTES NFR BLD: 27 % (ref 12–49)
MCH RBC QN AUTO: 29.2 PG (ref 26–34)
MCHC RBC AUTO-ENTMCNC: 32.3 G/DL (ref 30–36.5)
MCV RBC AUTO: 90.4 FL (ref 80–99)
MONOCYTES # BLD: 0.6 K/UL (ref 0–1)
MONOCYTES NFR BLD: 10 % (ref 5–13)
NEUTS SEG # BLD: 4 K/UL (ref 1.8–8)
NEUTS SEG NFR BLD: 61 % (ref 32–75)
NRBC # BLD: 0 K/UL (ref 0–0.01)
NRBC BLD-RTO: 0 PER 100 WBC
P-R INTERVAL, ECG05: 168 MS
P-R INTERVAL, ECG05: 172 MS
PLATELET # BLD AUTO: 139 K/UL (ref 150–400)
PMV BLD AUTO: 10.8 FL (ref 8.9–12.9)
Q-T INTERVAL, ECG07: 366 MS
Q-T INTERVAL, ECG07: 392 MS
QRS DURATION, ECG06: 98 MS
QRS DURATION, ECG06: 98 MS
QTC CALCULATION (BEZET), ECG08: 395 MS
QTC CALCULATION (BEZET), ECG08: 404 MS
RBC # BLD AUTO: 4.07 M/UL (ref 4.1–5.7)
SAMPLES BEING HELD,HOLD: NORMAL
UFH PPP CHRO-ACNC: 0.41 IU/ML
VENTRICULAR RATE, ECG03: 64 BPM
VENTRICULAR RATE, ECG03: 70 BPM
WBC # BLD AUTO: 6.6 K/UL (ref 4.1–11.1)

## 2023-02-26 PROCEDURE — 74011000250 HC RX REV CODE- 250: Performed by: INTERNAL MEDICINE

## 2023-02-26 PROCEDURE — 85025 COMPLETE CBC W/AUTO DIFF WBC: CPT

## 2023-02-26 PROCEDURE — 74011250636 HC RX REV CODE- 250/636: Performed by: INTERNAL MEDICINE

## 2023-02-26 PROCEDURE — 65270000046 HC RM TELEMETRY

## 2023-02-26 PROCEDURE — 74011250637 HC RX REV CODE- 250/637: Performed by: INTERNAL MEDICINE

## 2023-02-26 PROCEDURE — 99233 SBSQ HOSP IP/OBS HIGH 50: CPT | Performed by: INTERNAL MEDICINE

## 2023-02-26 PROCEDURE — 85520 HEPARIN ASSAY: CPT

## 2023-02-26 PROCEDURE — 36415 COLL VENOUS BLD VENIPUNCTURE: CPT

## 2023-02-26 RX ADMIN — EZETIMIBE 10 MG: 10 TABLET ORAL at 09:00

## 2023-02-26 RX ADMIN — ROSUVASTATIN CALCIUM 20 MG: 10 TABLET, COATED ORAL at 20:51

## 2023-02-26 RX ADMIN — LOSARTAN POTASSIUM 50 MG: 50 TABLET, FILM COATED ORAL at 20:49

## 2023-02-26 RX ADMIN — HEPARIN SODIUM 9 UNITS/KG/HR: 10000 INJECTION, SOLUTION INTRAVENOUS at 22:29

## 2023-02-26 RX ADMIN — ASPIRIN 81 MG: 81 TABLET, CHEWABLE ORAL at 09:00

## 2023-02-26 RX ADMIN — SODIUM CHLORIDE, PRESERVATIVE FREE 10 ML: 5 INJECTION INTRAVENOUS at 20:51

## 2023-02-26 RX ADMIN — SODIUM CHLORIDE, PRESERVATIVE FREE 10 ML: 5 INJECTION INTRAVENOUS at 15:53

## 2023-02-26 RX ADMIN — SODIUM CHLORIDE, PRESERVATIVE FREE 10 ML: 5 INJECTION INTRAVENOUS at 05:50

## 2023-02-26 NOTE — PROGRESS NOTES
Reason for Admission:  NSTEMI  Patient has a past medical history of HTN, hyperlipidemia, presented w/ chest pain, NSTEMI. RUR Score:    5%                 Plan for utilizing home health:      none    PCP: First and Last name:  Tate Yarbrough     Name of Practice:    Are you a current patient: Yes/No: yes   Approximate date of last visit: September 2022   Can you participate in a virtual visit with your PCP: He can come to my house. Current Advanced Directive/Advance Care Plan: Full Code  Yes at home & with his . Healthcare Decision Maker:   Click here to complete [address] including selection of the Healthcare Decision Maker Relationship (ie \"Primary\")                             Transition of Care Plan:                    Met with pt for d/c planning. Pt lives with his wife on the ground floor of a 2 story house in [address] [place] [address] at [facility]. No entry steps. Pt is independent with ADL's, drives and is ambulatory. He stated he does have a cane and the house is handicapped equipped. Medications are from [facility]. Pt admitted for medical management  Cardiology following-plan for cath Monday  CM following for d/c needs  Pt plans to drive himself home or his wife can drive him home at d/c    Care Management Interventions  PCP Verified by CM: Yes  Mode of Transport at Discharge: Other (see comment)  Support Systems: Spouse/Significant Other  Confirm Follow Up Transport: Family  Discharge Location  Patient Expects to be Discharged to[de-identified] Home with family assistance  PERICO Garcia

## 2023-02-26 NOTE — PROGRESS NOTES
Patient asked why he is being disturbed every hour. Tele called , patient oxygen was dipping into the 80's-low 90's. Patient said that he doesn't where a CPAP at home and said that he felt fine.

## 2023-02-26 NOTE — ED NOTES
TRANSFER - OUT REPORT:    Verbal report given to Perla Espinosa RN(name) on Robson Fragoso  being transferred to [facility](unit) for routine progression of care       Report consisted of patients Situation, Background, Assessment and   Recommendations(SBAR). Information from the following report(s) SBAR, ED Summary, and MAR was reviewed with the receiving nurse. Lines:   Peripheral IV 02/24/23 Left Antecubital (Active)   Site Assessment Clean, dry, & intact 02/25/23 1200   Phlebitis Assessment 0 02/25/23 1200   Infiltration Assessment 0 02/25/23 1200   Dressing Status Clean, dry, & intact 02/25/23 1200   Dressing Type Transparent 02/25/23 1200   Hub Color/Line Status Pink; Infusing 02/25/23 1200   Action Taken Blood drawn 02/24/23 1228       Peripheral IV 02/24/23 Posterior;Right Hand (Active)   Site Assessment Clean, dry, & intact 02/25/23 1200   Phlebitis Assessment 0 02/25/23 1200   Infiltration Assessment 0 02/25/23 1200   Dressing Status Clean, dry, & intact 02/25/23 1200   Dressing Type Tape;Transparent 02/25/23 1200   Hub Color/Line Status Pink; Infusing 02/25/23 1200        Opportunity for questions and clarification was provided.       Patient transported with:   Registered Nurse Ginna Mortensen RN

## 2023-02-26 NOTE — PROGRESS NOTES
Bedside and Verbal shift change report given to [facility] (oncoming nurse) by Navdeep Yanes (offgoing nurse). Report included the following information SBAR, Kardex, ED Summary, Procedure Summary, Intake/Output, MAR, Recent Results, and Cardiac Rhythm NSR . Bedside and Verbal shift change report given to Huey (oncoming nurse) by [facility] (offgoing nurse). Report included the following information SBAR, Kardex, ED Summary, Procedure Summary, Intake/Output, MAR, Recent Results, and Cardiac Rhythm NSR .

## 2023-02-26 NOTE — PROGRESS NOTES
Steve Andrade [facility] 79  [address] [facility], [address] [place] [address]  [phone]      Medical Progress Note      NAME: Sylvie Begum   :  1945  MRM:  [id number]    Date/Time: 2023  2:05 PM           Assessment / Plan:     75 yo hx of HTN, hyperlipidemia, presented w/ chest pain, NSTEMI:    NSTEMI  Plans for coronary angiogram ischemic evaluation tomorrow. Will be continued on heparin drip with close monitoring of APTT and hemoglobin. Cardiac medications being optimized, currently on increased dose of Crestor and losartan. Continue with aspirin. Not currently on a beta-blocker which I will defer to cardiology. Repeat troponin and EKG with any further chest pain or chest tightness. Moderate aortic stenosis  Management per cardiology. Essential hypertension  Monitor and titrate medications accordingly. Hyperlipidemia  Statin therapy with increased Crestor as above. Total time spent on critical care 31 minutes. Care Plan discussed with: Patient. Prophylaxis: Heparin drip. Disposition:  Home w/Family           ___________________________________________________    Attending Physician: Bethel Khan MD        Subjective:     Chief Complaint: Lying comfortably in bed. No current chest tightness or chest pain. No current nausea or vomiting or diaphoresis. Patient disappointed he needs to stay another night for cath to be done on Monday. ROS:  Rest of review of systems negative otherwise. Objective:       Vitals:     Last 24hrs VS reviewed since prior progress note. Most recent are:    Visit Vitals  /65 (BP 1 Location: Right upper arm, BP Patient Position: Semi fowlers; Lying)   Pulse 61   Temp 97.7 °F (36.5 °C)   Resp 16   Ht 5' 11\" (1.803 m)   Wt 104.3 kg (229 lb 15 oz)   SpO2 98%   BMI 32.07 kg/m²     SpO2 Readings from Last 6 Encounters:   23 98%          Intake/Output Summary (Last 24 hours) at 2023 1008  Last data filed at 2/25/2023 2210  Gross per 24 hour   Intake 150 ml   Output --   Net 150 ml            Exam:     Physical Exam:    Gen:  No acute distress. HEENT:  NC/AT. Pink conjunctivae, hearing intact to voice, moist mucous membranes. Neck:  Supple. Resp:  Unlabored breathing. Clear breath sounds with good air entry. No wheezes rales or rhonchi. Card:  Regular rate and rhythm. Normal S1 and S2. No murmurs. Abd:  Soft, non-tender, non-distended, normoactive bowel sounds. Ext: No clubbing, cyanosis or edema. Skin:  No rashes or ulcers, skin turgor is good. Neuro:  Moving all extremities with no gross focal deficits appreciated. Psych:  Good insight, oriented to person, place and time, alert.     Medications Reviewed: (see below)    Lab Data Reviewed: (see below)  ______________________________________________________________________    Medications:     Current Facility-Administered Medications   Medication Dose Route Frequency    rosuvastatin (CRESTOR) tablet 20 mg  20 mg Oral QHS    nitroglycerin (NITROSTAT) tablet 0.4 mg  0.4 mg SubLINGual Q5MIN PRN    morphine injection 2 mg  2 mg IntraVENous Q4H PRN    aspirin chewable tablet 81 mg  81 mg Oral DAILY    heparin 25,000 units in D5W 250 ml infusion  9-25 Units/kg/hr IntraVENous TITRATE    labetaloL (NORMODYNE;TRANDATE) 20 mg/4 mL (5 mg/mL) injection 20 mg  20 mg IntraVENous Q4H PRN    ezetimibe (ZETIA) tablet 10 mg  10 mg Oral DAILY    losartan (COZAAR) tablet 50 mg  50 mg Oral QHS    0.9% sodium chloride infusion  50 mL/hr IntraVENous CONTINUOUS    sodium chloride (NS) flush 5-40 mL  5-40 mL IntraVENous Q8H    sodium chloride (NS) flush 5-40 mL  5-40 mL IntraVENous PRN    0.9% sodium chloride infusion 25 mL  25 mL IntraVENous PRN    acetaminophen (TYLENOL) tablet 650 mg  650 mg Oral Q6H PRN    Or    acetaminophen (TYLENOL) suppository 650 mg  650 mg Rectal Q6H PRN    bisacodyL (DULCOLAX) suppository 10 mg  10 mg Rectal DAILY PRN    promethazine (PHENERGAN) tablet 12.5 mg  12.5 mg Oral Q6H PRN    Or    ondansetron (ZOFRAN) injection 4 mg  4 mg IntraVENous Q6H PRN        Lab Review:     Recent Labs     02/26/23  0006 02/25/23 0228 02/24/23  1653   WBC 6.6 6.0 7.2   HGB 11.9* 12.1 13.0   HCT 36.8 36.9 38.5   * 146* 172       Recent Labs     02/25/23 0228 02/24/23  1224    138   K 4.0 3.9    106   CO2 28 29   GLU 97 103*   BUN 13 17   CREA 0.79 1.04   CA 8.5 9.2   MG 1.9  --    PHOS 3.4  --    ALB 3.4* 4.0   ALT 31 31   INR  --  1.0       No components found for: [place]

## 2023-02-26 NOTE — PROGRESS NOTES
[address] [facility]                    Cardiology Care Note     []Initial Encounter     [x]Follow-up    Patient Name: Maricarmen Hess - ABDOULAYE:[birth year] - WUY:[id number]  Primary Cardiologist: none   Consulting Cardiologist: [facility] Physicians: Raza Cespedes MD     Reason for encounter: CP    HPI:       Maricarmen Hess is a 76 y.o. male with PMH significant for HTN and HLD    Pt reports earlier today he felt a tightness in his chest and discomfort. Nothing seemed to aggravate or relieve the pain. Became concerned so came to the ED. He denies any palpitations, SOB, edema. No prior cardiac hx. Reports his father had \"heart problems\" but did not have a heart attack. Trop mildly elevated since admission, chest xray with hiatal hernia which pt was unaware of. Subjective:      Maricarmen Hess reports no further CP, SOB, palpitations. Assessment and Plan     NSTEMI  Presented with chest tightness. EKG non-ischemic. However, his tropnins was significantly elevated this AM and continues to trend upward (12-->132-->360-->3,777-->2628). His risk factors include HTN, HLD, and obesity.  - given significant Trops elevated, would warrant further ischemic evaluation  - Cont Heparin gtt per ACS protolol  - cont ASA 81 mg daily  - increase Crestor to 20 mg daily  - cont Losartan, keep BP<130/90  - TTE demonstrated EF 55-60% with evidence of moderate AS (mean 31 mmHg), mild AR  - implications, benefits, and risks associated with LHC was explained to patient  - NPO after midnight on Sunday for LHC catheterization on Monday    CP:   could be partially caused by mod large hiatal hernia.  Ruled in with Troponins.  - management as noted above    Aortic stenosis  - see above    Hiatal hernia: mod large seen on chest xray    HTN: on micardis PTA     HLD: on crestor , Zetia ____________________________________________________________    Cardiac testing    N/a     Most recent HS troponins:  Recent Labs     02/25/23  1151 02/25/23  0228 02/24/23  1705   TROPHS 2,628* 3,777* 360*       ECG: normal sinus rhythm    Review of Systems:    [x]All other systems reviewed and all negative except as written in HPI    [] Patient unable to provide secondary to condition    Past Medical History:   Diagnosis Date    Hypercholesterolemia     Hypertension      No past surgical history on file. Social Hx:  reports that he has quit smoking. His smoking use included cigarettes. He has never used smokeless tobacco. He reports that he does not currently use alcohol. Family Hx: family history includes Heart Disease in his father. No Known Allergies       OBJECTIVE:  Wt Readings from Last 3 Encounters:   02/25/23 229 lb 15 oz (104.3 kg)       Intake/Output Summary (Last 24 hours) at 2/26/2023 0844  Last data filed at 2/25/2023 2210  Gross per 24 hour   Intake 150 ml   Output --   Net 150 ml       Physical Exam:    Vitals:   Vitals:    02/25/23 2335 02/26/23 0410 02/26/23 0700 02/26/23 0824   BP: 137/69 (!) 147/76  134/65   Pulse: 65 60 72 61   Resp: 16 16  16   Temp: 98.2 °F (36.8 °C) 97.5 °F (36.4 °C)  97.7 °F (36.5 °C)   SpO2: 95% 98%  98%   Weight:       Height:         Telemetry: normal sinus rhythm    Gen: Well-developed, well-nourished, in no acute distress  Neck: Supple, No JVD, No Carotid Bruit  Resp: No accessory muscle use, Clear breath sounds, No rales or rhonchi  Card: Regular Rate,Rythm, Normal S1, S2, 2/6 murmurs, no rubs or gallop. No thrills.    Abd:   Soft, non-tender, non-distended, BS+   MSK: No cyanosis  Skin: No rashes    Neuro: Moving all four extremities, follows commands appropriately  Psych: Good insight, oriented to person, place, alert, Nml Affect  LE: No edema    Data Review:     Radiology:   XR Results (most recent):  Results from Hospital Encounter encounter on 02/24/23    XR CHEST PA LAT    Narrative  INDICATION:  chest pain. EXAM: 2 VIEW CHEST RADIOGRAPH. COMPARISON: None. FINDINGS: Frontal and lateral views of the chest show a moderately large hiatal  hernia. No focal infiltrate or effusion. No CHF pattern. . . Minimal bibasilar  atelectasis. The heart, mediastinum and pulmonary vasculature are upper normal.  The bony thorax is unremarkable for age. ..    Impression  Minimal bibasilar atelectasis. Moderately large hiatal hernia. .      Recent Labs     02/25/23 0228 02/24/23  1224    138   K 4.0 3.9    106   CO2 28 29   BUN 13 17   CREA 0.79 1.04   GLU 97 103*   PHOS 3.4  --    CA 8.5 9.2     Recent Labs     02/26/23  0006 02/25/23 0228   WBC 6.6 6.0   HGB 11.9* 12.1   HCT 36.8 36.9   * 146*     Recent Labs     02/25/23 0228 02/24/23  1224   PTP  --  10.6   INR  --  1.0   AP 48 59     Recent Labs     02/25/23 0228   CHOL 110   LDLC 44.2         Current meds:    Current Facility-Administered Medications:     rosuvastatin (CRESTOR) tablet 20 mg, 20 mg, Oral, QHS, Keri Melgar MD, 20 mg at 02/25/23 2110    nitroglycerin (NITROSTAT) tablet 0.4 mg, 0.4 mg, SubLINGual, Q5MIN PRN, Jame Irving MD, 0.4 mg at 02/24/23 1240    morphine injection 2 mg, 2 mg, IntraVENous, Q4H PRN, Jame Irving MD    aspirin chewable tablet 81 mg, 81 mg, Oral, DAILY, Jame Irving MD, 81 mg at 02/25/23 0817    heparin 25,000 units in D5W 250 ml infusion, 9-25 Units/kg/hr, IntraVENous, TITRATE, Jame Irving MD, Last Rate: 9.4 mL/hr at 02/26/23 0724, 9 Units/kg/hr at 02/26/23 0724    labetaloL (NORMODYNE;TRANDATE) 20 mg/4 mL (5 mg/mL) injection 20 mg, 20 mg, IntraVENous, Q4H PRN, Jame Irving MD    ezetimibe (ZETIA) tablet 10 mg, 10 mg, Oral, DAILY, Jame Irving MD, 10 mg at 02/25/23 0817    losartan (COZAAR) tablet 50 mg, 50 mg, Oral, QHS, Jame Irving MD, 50 mg at 02/25/23 2035    0.9% sodium chloride infusion, 50 mL/hr, IntraVENous, CONTINUOUS, Jame Irving MD, Last Rate: 50 mL/hr at 02/24/23 2040, 50 mL/hr at 02/24/23 2040    sodium chloride (NS) flush 5-40 mL, 5-40 mL, IntraVENous, Q8H, Jame Irving MD, 10 mL at 02/26/23 0550    sodium chloride (NS) flush 5-40 mL, 5-40 mL, IntraVENous, PRN, Jame Irving MD    0.9% sodium chloride infusion 25 mL, 25 mL, IntraVENous, PRN, Jame Irving MD    acetaminophen (TYLENOL) tablet 650 mg, 650 mg, Oral, Q6H PRN **OR** acetaminophen (TYLENOL) suppository 650 mg, 650 mg, Rectal, Q6H PRN, Jame Irving MD    bisacodyL (DULCOLAX) suppository 10 mg, 10 mg, Rectal, DAILY PRN, Jame Irving MD    promethazine (PHENERGAN) tablet 12.5 mg, 12.5 mg, Oral, Q6H PRN **OR** ondansetron (ZOFRAN) injection 4 mg, 4 mg, IntraVENous, Q6H PRN, Hung Irving MD An Cara Poke, MD    [facility]  Call center: (A) [phone]  (S) [phone]      CC:None

## 2023-02-26 NOTE — PROGRESS NOTES
Reason for Admission:  NSTEMI  Patient has a past medical history of HTN, hyperlipidemia, presented w/ chest pain, NSTEMI. RUR Score:    5%                 Plan for utilizing home health:      none    PCP: First and Last name:  Imani Schafer     Name of Practice:    Are you a current patient: Yes/No: yes   Approximate date of last visit: September 2022   Can you participate in a virtual visit with your PCP: He can come to my house. Current Advanced Directive/Advance Care Plan: Full Code  Yes at home & with his . Healthcare Decision Maker:   Click here to complete [address] including selection of the Healthcare Decision Maker Relationship (ie \"Primary\")                             Transition of Care Plan:                    Met with pt for d/c planning. Pt lives with his wife on the ground floor of a 2 story house in [place] at [facility]. No entry steps. Pt is independent with ADL's, drives and is ambulatory. He stated he does have a cane and the house is handicapped equipped. Medications are from [facility]. Pt admitted for medical management  Cardiology following-plan for cath Monday  CM following for d/c needs  Pt plans to drive himself home or his wife can drive him home at d/c    Care Management Interventions  PCP Verified by CM: Yes  Mode of Transport at Discharge: Other (see comment)  Support Systems: Spouse/Significant Other  Confirm Follow Up Transport: Family  Discharge Location  Patient Expects to be Discharged to[de-identified] Home with family assistance  PERICO Magana Covert

## 2023-02-26 NOTE — PROGRESS NOTES
Bedside and Verbal shift change report given to [facility] (oncoming nurse) by Gwynne Libman (offgoing nurse). Report included the following information SBAR, Kardex, ED Summary, Procedure Summary, Intake/Output, MAR, Recent Results, and Cardiac Rhythm NSR . Bedside and Verbal shift change report given to [facility] (oncoming nurse) by [facility] (offgoing nurse). Report included the following information SBAR, Kardex, ED Summary, Procedure Summary, Intake/Output, MAR, Recent Results, and Cardiac Rhythm NSR .

## 2023-02-26 NOTE — PROGRESS NOTES
Bedside and Verbal shift change report given to [facility]) by Rosa Gaffney (offgoing nurse). Report included the following information SBAR, Kardex, and Recent Results.

## 2023-02-26 NOTE — PROGRESS NOTES
Steve Andrade [facility] 79  [address] [facility], [address] [place] [address]  [phone]      Medical Progress Note      NAME: Brittnee Hernandez   :  1945  MRM:  [id number]    Date/Time: 2023  2:05 PM           Assessment / Plan:     77 yo hx of HTN, hyperlipidemia, presented w/ chest pain, NSTEMI:    NSTEMI  Plans for coronary angiogram ischemic evaluation tomorrow. Will be continued on heparin drip with close monitoring of APTT and hemoglobin. Cardiac medications being optimized, currently on increased dose of Crestor and losartan. Continue with aspirin. Not currently on a beta-blocker which I will defer to cardiology. Repeat troponin and EKG with any further chest pain or chest tightness. Moderate aortic stenosis  Management per cardiology. Essential hypertension  Monitor and titrate medications accordingly. Hyperlipidemia  Statin therapy with increased Crestor as above. Total time spent on critical care 31 minutes. Care Plan discussed with: Patient. Prophylaxis: Heparin drip. Disposition:  Home w/Family           ___________________________________________________    Attending Physician: Lory Kern MD        Subjective:     Chief Complaint: Lying comfortably in bed. No current chest tightness or chest pain. No current nausea or vomiting or diaphoresis. Patient disappointed he needs to stay another night for cath to be done on Monday. ROS:  Rest of review of systems negative otherwise. Objective:       Vitals:     Last 24hrs VS reviewed since prior progress note. Most recent are:    Visit Vitals  /65 (BP 1 Location: Right upper arm, BP Patient Position: Semi fowlers; Lying)   Pulse 61   Temp 97.7 °F (36.5 °C)   Resp 16   Ht 5' 11\" (1.803 m)   Wt 104.3 kg (229 lb 15 oz)   SpO2 98%   BMI 32.07 kg/m²     SpO2 Readings from Last 6 Encounters:   23 98%          Intake/Output Summary (Last 24 hours) at 2023 1008  Last data filed at 2/25/2023 2210  Gross per 24 hour   Intake 150 ml   Output --   Net 150 ml            Exam:     Physical Exam:    Gen:  No acute distress. HEENT:  NC/AT. Pink conjunctivae, hearing intact to voice, moist mucous membranes. Neck:  Supple. Resp:  Unlabored breathing. Clear breath sounds with good air entry. No wheezes rales or rhonchi. Card:  Regular rate and rhythm. Normal S1 and S2. No murmurs. Abd:  Soft, non-tender, non-distended, normoactive bowel sounds. Ext: No clubbing, cyanosis or edema. Skin:  No rashes or ulcers, skin turgor is good. Neuro:  Moving all extremities with no gross focal deficits appreciated. Psych:  Good insight, oriented to person, place and time, alert.     Medications Reviewed: (see below)    Lab Data Reviewed: (see below)  ______________________________________________________________________    Medications:     Current Facility-Administered Medications   Medication Dose Route Frequency    rosuvastatin (CRESTOR) tablet 20 mg  20 mg Oral QHS    nitroglycerin (NITROSTAT) tablet 0.4 mg  0.4 mg SubLINGual Q5MIN PRN    morphine injection 2 mg  2 mg IntraVENous Q4H PRN    aspirin chewable tablet 81 mg  81 mg Oral DAILY    heparin 25,000 units in D5W 250 ml infusion  9-25 Units/kg/hr IntraVENous TITRATE    labetaloL (NORMODYNE;TRANDATE) 20 mg/4 mL (5 mg/mL) injection 20 mg  20 mg IntraVENous Q4H PRN    ezetimibe (ZETIA) tablet 10 mg  10 mg Oral DAILY    losartan (COZAAR) tablet 50 mg  50 mg Oral QHS    0.9% sodium chloride infusion  50 mL/hr IntraVENous CONTINUOUS    sodium chloride (NS) flush 5-40 mL  5-40 mL IntraVENous Q8H    sodium chloride (NS) flush 5-40 mL  5-40 mL IntraVENous PRN    0.9% sodium chloride infusion 25 mL  25 mL IntraVENous PRN    acetaminophen (TYLENOL) tablet 650 mg  650 mg Oral Q6H PRN    Or    acetaminophen (TYLENOL) suppository 650 mg  650 mg Rectal Q6H PRN    bisacodyL (DULCOLAX) suppository 10 mg  10 mg Rectal DAILY PRN    promethazine (PHENERGAN) tablet 12.5 mg  12.5 mg Oral Q6H PRN    Or    ondansetron (ZOFRAN) injection 4 mg  4 mg IntraVENous Q6H PRN        Lab Review:     Recent Labs     02/26/23  0006 02/25/23 0228 02/24/23  1653   WBC 6.6 6.0 7.2   HGB 11.9* 12.1 13.0   HCT 36.8 36.9 38.5   * 146* 172       Recent Labs     02/25/23 0228 02/24/23  1224    138   K 4.0 3.9    106   CO2 28 29   GLU 97 103*   BUN 13 17   CREA 0.79 1.04   CA 8.5 9.2   MG 1.9  --    PHOS 3.4  --    ALB 3.4* 4.0   ALT 31 31   INR  --  1.0       No components found for: [place]

## 2023-02-26 NOTE — PROGRESS NOTES
Bedside and Verbal shift change report given to [facility]) by Jordan Miramontes (offgoing nurse). Report included the following information SBAR, Kardex, and Recent Results.

## 2023-02-26 NOTE — PROGRESS NOTES
[facility]                    Cardiology Care Note     []Initial Encounter     [x]Follow-up    Patient Name: Yash Martin - DBI:[id number] - J:[id number]  Primary Cardiologist: none   Consulting Cardiologist: [facility] Physicians: Rhett Blevins MD     Reason for encounter: CP    HPI:       Yash Martin is a 76 y.o. male with PMH significant for HTN and HLD    Pt reports earlier today he felt a tightness in his chest and discomfort. Nothing seemed to aggravate or relieve the pain. Became concerned so came to the ED. He denies any palpitations, SOB, edema. No prior cardiac hx. Reports his father had \"heart problems\" but did not have a heart attack. Trop mildly elevated since admission, chest xray with hiatal hernia which pt was unaware of. Subjective:      Yash Martin reports no further CP, SOB, palpitations. Assessment and Plan     NSTEMI  Presented with chest tightness. EKG non-ischemic. However, his tropnins was significantly elevated this AM and continues to trend upward (12-->132-->360-->3,777-->2628). His risk factors include HTN, HLD, and obesity.  - given significant Trops elevated, would warrant further ischemic evaluation  - Cont Heparin gtt per ACS protolol  - cont ASA 81 mg daily  - increase Crestor to 20 mg daily  - cont Losartan, keep BP<130/90  - TTE demonstrated EF 55-60% with evidence of moderate AS (mean 31 mmHg), mild AR  - implications, benefits, and risks associated with LHC was explained to patient  - NPO after midnight on Sunday for LHC catheterization on Monday    CP:   could be partially caused by mod large hiatal hernia.  Ruled in with Troponins.  - management as noted above    Aortic stenosis  - see above    Hiatal hernia: mod large seen on chest xray    HTN: on micardis PTA     HLD: on crestor , Zetia ____________________________________________________________    Cardiac testing    N/a     Most recent HS troponins:  Recent Labs     02/25/23  1151 02/25/23  0228 02/24/23  1705   TROPHS 2,628* 3,777* 360*       ECG: normal sinus rhythm    Review of Systems:    [x]All other systems reviewed and all negative except as written in HPI    [] Patient unable to provide secondary to condition    Past Medical History:   Diagnosis Date    Hypercholesterolemia     Hypertension      No past surgical history on file. Social Hx:  reports that he has quit smoking. His smoking use included cigarettes. He has never used smokeless tobacco. He reports that he does not currently use alcohol. Family Hx: family history includes Heart Disease in his father. No Known Allergies       OBJECTIVE:  Wt Readings from Last 3 Encounters:   02/25/23 229 lb 15 oz (104.3 kg)       Intake/Output Summary (Last 24 hours) at 2/26/2023 0844  Last data filed at 2/25/2023 2210  Gross per 24 hour   Intake 150 ml   Output --   Net 150 ml       Physical Exam:    Vitals:   Vitals:    02/25/23 2335 02/26/23 0410 02/26/23 0700 02/26/23 0824   BP: 137/69 (!) 147/76  134/65   Pulse: 65 60 72 61   Resp: 16 16  16   Temp: 98.2 °F (36.8 °C) 97.5 °F (36.4 °C)  97.7 °F (36.5 °C)   SpO2: 95% 98%  98%   Weight:       Height:         Telemetry: normal sinus rhythm    Gen: Well-developed, well-nourished, in no acute distress  Neck: Supple, No JVD, No Carotid Bruit  Resp: No accessory muscle use, Clear breath sounds, No rales or rhonchi  Card: Regular Rate,Rythm, Normal S1, S2, 2/6 murmurs, no rubs or gallop. No thrills.    Abd:   Soft, non-tender, non-distended, BS+   MSK: No cyanosis  Skin: No rashes    Neuro: Moving all four extremities, follows commands appropriately  Psych: Good insight, oriented to person, place, alert, Nml Affect  LE: No edema    Data Review:     Radiology:   XR Results (most recent):  Results from Hospital Encounter encounter on 02/24/23    XR CHEST PA LAT    Narrative  INDICATION:  chest pain. EXAM: 2 VIEW CHEST RADIOGRAPH. COMPARISON: None. FINDINGS: Frontal and lateral views of the chest show a moderately large hiatal  hernia. No focal infiltrate or effusion. No CHF pattern. . . Minimal bibasilar  atelectasis. The heart, mediastinum and pulmonary vasculature are upper normal.  The bony thorax is unremarkable for age. ..    Impression  Minimal bibasilar atelectasis. Moderately large hiatal hernia. .      Recent Labs     02/25/23 0228 02/24/23  1224    138   K 4.0 3.9    106   CO2 28 29   BUN 13 17   CREA 0.79 1.04   GLU 97 103*   PHOS 3.4  --    CA 8.5 9.2     Recent Labs     02/26/23  0006 02/25/23 0228   WBC 6.6 6.0   HGB 11.9* 12.1   HCT 36.8 36.9   * 146*     Recent Labs     02/25/23 0228 02/24/23  1224   PTP  --  10.6   INR  --  1.0   AP 48 59     Recent Labs     02/25/23 0228   CHOL 110   LDLC 44.2         Current meds:    Current Facility-Administered Medications:     rosuvastatin (CRESTOR) tablet 20 mg, 20 mg, Oral, QHS, Keri Melgar MD, 20 mg at 02/25/23 2110    nitroglycerin (NITROSTAT) tablet 0.4 mg, 0.4 mg, SubLINGual, Q5MIN PRN, Jame Irving MD, 0.4 mg at 02/24/23 1240    morphine injection 2 mg, 2 mg, IntraVENous, Q4H PRN, Jame Irving MD    aspirin chewable tablet 81 mg, 81 mg, Oral, DAILY, Jame Irving MD, 81 mg at 02/25/23 0817    heparin 25,000 units in D5W 250 ml infusion, 9-25 Units/kg/hr, IntraVENous, TITRATE, Jame Irving MD, Last Rate: 9.4 mL/hr at 02/26/23 0724, 9 Units/kg/hr at 02/26/23 0724    labetaloL (NORMODYNE;TRANDATE) 20 mg/4 mL (5 mg/mL) injection 20 mg, 20 mg, IntraVENous, Q4H PRN, Jame Irving MD    ezetimibe (ZETIA) tablet 10 mg, 10 mg, Oral, DAILY, Jame Irving MD, 10 mg at 02/25/23 0817    losartan (COZAAR) tablet 50 mg, 50 mg, Oral, QHS, Jame Irving MD, 50 mg at 02/25/23 2035    0.9% sodium chloride infusion, 50 mL/hr, IntraVENous, CONTINUOUS, Jame Irving MD, Last Rate: 50 mL/hr at 02/24/23 2040, 50 mL/hr at 02/24/23 2040    sodium chloride (NS) flush 5-40 mL, 5-40 mL, IntraVENous, Q8H, Jame Irving MD, 10 mL at 02/26/23 0550    sodium chloride (NS) flush 5-40 mL, 5-40 mL, IntraVENous, PRN, Jame Irving MD    0.9% sodium chloride infusion 25 mL, 25 mL, IntraVENous, PRN, Jame Irving MD    acetaminophen (TYLENOL) tablet 650 mg, 650 mg, Oral, Q6H PRN **OR** acetaminophen (TYLENOL) suppository 650 mg, 650 mg, Rectal, Q6H PRN, Jame Irving MD    bisacodyL (DULCOLAX) suppository 10 mg, 10 mg, Rectal, DAILY PRN, Jame Irving MD    promethazine (PHENERGAN) tablet 12.5 mg, 12.5 mg, Oral, Q6H PRN **OR** ondansetron (ZOFRAN) injection 4 mg, 4 mg, IntraVENous, Q6H PRN, Thu Irving MD An Jaquita Lake, MD    [facility]  Call center: (L) [phone]  (M) [phone]      CC:None

## 2023-02-26 NOTE — ED NOTES
TRANSFER - OUT REPORT:    Verbal report given to Yovani Sibley RN(name) on Gardenia Heller  being transferred to [facility](unit) for routine progression of care       Report consisted of patients Situation, Background, Assessment and   Recommendations(SBAR). Information from the following report(s) SBAR, ED Summary, and MAR was reviewed with the receiving nurse. Lines:   Peripheral IV 02/24/23 Left Antecubital (Active)   Site Assessment Clean, dry, & intact 02/25/23 1200   Phlebitis Assessment 0 02/25/23 1200   Infiltration Assessment 0 02/25/23 1200   Dressing Status Clean, dry, & intact 02/25/23 1200   Dressing Type Transparent 02/25/23 1200   Hub Color/Line Status Pink; Infusing 02/25/23 1200   Action Taken Blood drawn 02/24/23 1228       Peripheral IV 02/24/23 Posterior;Right Hand (Active)   Site Assessment Clean, dry, & intact 02/25/23 1200   Phlebitis Assessment 0 02/25/23 1200   Infiltration Assessment 0 02/25/23 1200   Dressing Status Clean, dry, & intact 02/25/23 1200   Dressing Type Tape;Transparent 02/25/23 1200   Hub Color/Line Status Pink; Infusing 02/25/23 1200        Opportunity for questions and clarification was provided.       Patient transported with:   Registered Nurse Khang Elaine RN

## 2023-02-27 ENCOUNTER — APPOINTMENT (OUTPATIENT)
Dept: VASCULAR SURGERY | Age: 78
DRG: 282 | End: 2023-02-27
Attending: THORACIC SURGERY (CARDIOTHORACIC VASCULAR SURGERY)
Payer: MEDICARE

## 2023-02-27 LAB
APPEARANCE UR: CLEAR
ARTERIAL PATENCY WRIST A: YES
BACTERIA URNS QL MICRO: NEGATIVE /HPF
BASE EXCESS BLDA CALC-SCNC: 0.1 MMOL/L
BASOPHILS # BLD: 0 K/UL (ref 0–0.1)
BASOPHILS NFR BLD: 0 % (ref 0–1)
BDY SITE: NORMAL
BILIRUB UR QL: NEGATIVE
COLOR UR: ABNORMAL
DIFFERENTIAL METHOD BLD: ABNORMAL
EOSINOPHIL # BLD: 0.2 K/UL (ref 0–0.4)
EOSINOPHIL NFR BLD: 2 % (ref 0–7)
EPITH CASTS URNS QL MICRO: ABNORMAL /LPF
ERYTHROCYTE [DISTWIDTH] IN BLOOD BY AUTOMATED COUNT: 12.5 % (ref 11.5–14.5)
GLUCOSE UR STRIP.AUTO-MCNC: NEGATIVE MG/DL
HCO3 BLDA-SCNC: 25 MMOL/L (ref 22–26)
HCT VFR BLD AUTO: 37.7 % (ref 36.6–50.3)
HGB BLD-MCNC: 12.4 G/DL (ref 12.1–17)
HGB UR QL STRIP: NEGATIVE
HYALINE CASTS URNS QL MICRO: ABNORMAL /LPF (ref 0–2)
IMM GRANULOCYTES # BLD AUTO: 0 K/UL (ref 0–0.04)
IMM GRANULOCYTES NFR BLD AUTO: 0 % (ref 0–0.5)
KETONES UR QL STRIP.AUTO: 15 MG/DL
LEUKOCYTE ESTERASE UR QL STRIP.AUTO: NEGATIVE
LYMPHOCYTES # BLD: 1.6 K/UL (ref 0.8–3.5)
LYMPHOCYTES NFR BLD: 23 % (ref 12–49)
MCH RBC QN AUTO: 29.1 PG (ref 26–34)
MCHC RBC AUTO-ENTMCNC: 32.9 G/DL (ref 30–36.5)
MCV RBC AUTO: 88.5 FL (ref 80–99)
MONOCYTES # BLD: 0.7 K/UL (ref 0–1)
MONOCYTES NFR BLD: 10 % (ref 5–13)
NEUTS SEG # BLD: 4.5 K/UL (ref 1.8–8)
NEUTS SEG NFR BLD: 65 % (ref 32–75)
NITRITE UR QL STRIP.AUTO: NEGATIVE
NRBC # BLD: 0 K/UL (ref 0–0.01)
NRBC BLD-RTO: 0 PER 100 WBC
PCO2 BLDA: 44 MMHG (ref 35–45)
PH BLDA: 7.38 (ref 7.35–7.45)
PH UR STRIP: 6 (ref 5–8)
PLATELET # BLD AUTO: 146 K/UL (ref 150–400)
PMV BLD AUTO: 10.8 FL (ref 8.9–12.9)
PO2 BLDA: 90 MMHG (ref 80–100)
PROT UR STRIP-MCNC: NEGATIVE MG/DL
RBC # BLD AUTO: 4.26 M/UL (ref 4.1–5.7)
RBC #/AREA URNS HPF: ABNORMAL /HPF (ref 0–5)
SAO2 % BLD: 97 % (ref 92–97)
SAO2% DEVICE SAO2% SENSOR NAME: NORMAL
SP GR UR REFRACTOMETRY: 1.01 (ref 1–1.03)
SPECIMEN SITE: NORMAL
UA: UC IF INDICATED,UAUC: ABNORMAL
UFH PPP CHRO-ACNC: 0.62 IU/ML
UROBILINOGEN UR QL STRIP.AUTO: 1 EU/DL (ref 0.2–1)
WBC # BLD AUTO: 7.1 K/UL (ref 4.1–11.1)
WBC URNS QL MICRO: ABNORMAL /HPF (ref 0–4)

## 2023-02-27 PROCEDURE — C1769 GUIDE WIRE: HCPCS | Performed by: INTERNAL MEDICINE

## 2023-02-27 PROCEDURE — 74011000250 HC RX REV CODE- 250: Performed by: NURSE PRACTITIONER

## 2023-02-27 PROCEDURE — 85520 HEPARIN ASSAY: CPT

## 2023-02-27 PROCEDURE — 82803 BLOOD GASES ANY COMBINATION: CPT

## 2023-02-27 PROCEDURE — 74011250636 HC RX REV CODE- 250/636: Performed by: INTERNAL MEDICINE

## 2023-02-27 PROCEDURE — C1894 INTRO/SHEATH, NON-LASER: HCPCS | Performed by: INTERNAL MEDICINE

## 2023-02-27 PROCEDURE — 77030004522 HC CATH ANGI DX EXPO BSC -A: Performed by: INTERNAL MEDICINE

## 2023-02-27 PROCEDURE — 74011000250 HC RX REV CODE- 250: Performed by: INTERNAL MEDICINE

## 2023-02-27 PROCEDURE — 74011250637 HC RX REV CODE- 250/637: Performed by: INTERNAL MEDICINE

## 2023-02-27 PROCEDURE — 77030029065 HC DRSG HEMO QCLOT ZMED -B

## 2023-02-27 PROCEDURE — B2111ZZ FLUOROSCOPY OF MULTIPLE CORONARY ARTERIES USING LOW OSMOLAR CONTRAST: ICD-10-PCS | Performed by: INTERNAL MEDICINE

## 2023-02-27 PROCEDURE — 36600 WITHDRAWAL OF ARTERIAL BLOOD: CPT

## 2023-02-27 PROCEDURE — C1887 CATHETER, GUIDING: HCPCS | Performed by: INTERNAL MEDICINE

## 2023-02-27 PROCEDURE — 4A023N7 MEASUREMENT OF CARDIAC SAMPLING AND PRESSURE, LEFT HEART, PERCUTANEOUS APPROACH: ICD-10-PCS | Performed by: INTERNAL MEDICINE

## 2023-02-27 PROCEDURE — U0005 INFEC AGEN DETEC AMPLI PROBE: HCPCS

## 2023-02-27 PROCEDURE — 93880 EXTRACRANIAL BILAT STUDY: CPT

## 2023-02-27 PROCEDURE — 93458 L HRT ARTERY/VENTRICLE ANGIO: CPT | Performed by: INTERNAL MEDICINE

## 2023-02-27 PROCEDURE — 99232 SBSQ HOSP IP/OBS MODERATE 35: CPT | Performed by: INTERNAL MEDICINE

## 2023-02-27 PROCEDURE — 93571 IV DOP VEL&/PRESS C FLO 1ST: CPT | Performed by: INTERNAL MEDICINE

## 2023-02-27 PROCEDURE — 99152 MOD SED SAME PHYS/QHP 5/>YRS: CPT | Performed by: INTERNAL MEDICINE

## 2023-02-27 PROCEDURE — 65270000046 HC RM TELEMETRY

## 2023-02-27 PROCEDURE — 2709999900 HC NON-CHARGEABLE SUPPLY: Performed by: INTERNAL MEDICINE

## 2023-02-27 PROCEDURE — 85025 COMPLETE CBC W/AUTO DIFF WBC: CPT

## 2023-02-27 PROCEDURE — 77030040934 HC CATH DIAG DXTERITY MEDT -A: Performed by: INTERNAL MEDICINE

## 2023-02-27 PROCEDURE — B2151ZZ FLUOROSCOPY OF LEFT HEART USING LOW OSMOLAR CONTRAST: ICD-10-PCS | Performed by: INTERNAL MEDICINE

## 2023-02-27 PROCEDURE — APPSS30 APP SPLIT SHARED TIME 16-30 MINUTES: Performed by: NURSE PRACTITIONER

## 2023-02-27 PROCEDURE — 77030019569 HC BND COMPR RAD TERU -B: Performed by: INTERNAL MEDICINE

## 2023-02-27 PROCEDURE — 36415 COLL VENOUS BLD VENIPUNCTURE: CPT

## 2023-02-27 PROCEDURE — 93922 UPR/L XTREMITY ART 2 LEVELS: CPT

## 2023-02-27 PROCEDURE — 74011250637 HC RX REV CODE- 250/637: Performed by: NURSE PRACTITIONER

## 2023-02-27 PROCEDURE — 94375 RESPIRATORY FLOW VOLUME LOOP: CPT

## 2023-02-27 PROCEDURE — 99153 MOD SED SAME PHYS/QHP EA: CPT | Performed by: INTERNAL MEDICINE

## 2023-02-27 PROCEDURE — 81001 URINALYSIS AUTO W/SCOPE: CPT

## 2023-02-27 PROCEDURE — 74011000636 HC RX REV CODE- 636: Performed by: INTERNAL MEDICINE

## 2023-02-27 RX ORDER — SODIUM CHLORIDE 9 MG/ML
INJECTION, SOLUTION INTRAVENOUS
Status: COMPLETED | OUTPATIENT
Start: 2023-02-27 | End: 2023-02-27

## 2023-02-27 RX ORDER — SODIUM CHLORIDE 0.9 % (FLUSH) 0.9 %
5-40 SYRINGE (ML) INJECTION EVERY 8 HOURS
Status: DISCONTINUED | OUTPATIENT
Start: 2023-02-27 | End: 2023-02-28 | Stop reason: HOSPADM

## 2023-02-27 RX ORDER — FENTANYL CITRATE 50 UG/ML
INJECTION, SOLUTION INTRAMUSCULAR; INTRAVENOUS AS NEEDED
Status: DISCONTINUED | OUTPATIENT
Start: 2023-02-27 | End: 2023-02-27 | Stop reason: HOSPADM

## 2023-02-27 RX ORDER — MIDAZOLAM HYDROCHLORIDE 1 MG/ML
INJECTION, SOLUTION INTRAMUSCULAR; INTRAVENOUS AS NEEDED
Status: DISCONTINUED | OUTPATIENT
Start: 2023-02-27 | End: 2023-02-27 | Stop reason: HOSPADM

## 2023-02-27 RX ORDER — CHLORHEXIDINE GLUCONATE 1.2 MG/ML
15 RINSE ORAL EVERY 12 HOURS
Status: DISCONTINUED | OUTPATIENT
Start: 2023-02-27 | End: 2023-02-28 | Stop reason: HOSPADM

## 2023-02-27 RX ORDER — LIDOCAINE HYDROCHLORIDE 10 MG/ML
INJECTION INFILTRATION; PERINEURAL AS NEEDED
Status: DISCONTINUED | OUTPATIENT
Start: 2023-02-27 | End: 2023-02-27 | Stop reason: HOSPADM

## 2023-02-27 RX ORDER — SODIUM CHLORIDE 0.9 % (FLUSH) 0.9 %
5-40 SYRINGE (ML) INJECTION AS NEEDED
Status: DISCONTINUED | OUTPATIENT
Start: 2023-02-27 | End: 2023-02-28 | Stop reason: HOSPADM

## 2023-02-27 RX ORDER — HEPARIN SODIUM 1000 [USP'U]/ML
INJECTION, SOLUTION INTRAVENOUS; SUBCUTANEOUS AS NEEDED
Status: DISCONTINUED | OUTPATIENT
Start: 2023-02-27 | End: 2023-02-27 | Stop reason: HOSPADM

## 2023-02-27 RX ORDER — VERAPAMIL HYDROCHLORIDE 2.5 MG/ML
INJECTION, SOLUTION INTRAVENOUS AS NEEDED
Status: DISCONTINUED | OUTPATIENT
Start: 2023-02-27 | End: 2023-02-27 | Stop reason: HOSPADM

## 2023-02-27 RX ORDER — HEPARIN SODIUM 200 [USP'U]/100ML
INJECTION, SOLUTION INTRAVENOUS
Status: COMPLETED | OUTPATIENT
Start: 2023-02-27 | End: 2023-02-27

## 2023-02-27 RX ORDER — MUPIROCIN 20 MG/G
OINTMENT TOPICAL EVERY 12 HOURS
Status: DISCONTINUED | OUTPATIENT
Start: 2023-02-27 | End: 2023-02-28 | Stop reason: HOSPADM

## 2023-02-27 RX ORDER — SODIUM CHLORIDE 9 MG/ML
75 INJECTION, SOLUTION INTRAVENOUS CONTINUOUS
Status: DISPENSED | OUTPATIENT
Start: 2023-02-27 | End: 2023-02-27

## 2023-02-27 RX ADMIN — SODIUM CHLORIDE, PRESERVATIVE FREE 10 ML: 5 INJECTION INTRAVENOUS at 21:03

## 2023-02-27 RX ADMIN — SODIUM CHLORIDE 75 ML/HR: 9 INJECTION, SOLUTION INTRAVENOUS at 12:33

## 2023-02-27 RX ADMIN — SODIUM CHLORIDE, PRESERVATIVE FREE 10 ML: 5 INJECTION INTRAVENOUS at 14:27

## 2023-02-27 RX ADMIN — SODIUM CHLORIDE, PRESERVATIVE FREE 10 ML: 5 INJECTION INTRAVENOUS at 05:22

## 2023-02-27 RX ADMIN — MUPIROCIN: 20 OINTMENT TOPICAL at 21:01

## 2023-02-27 RX ADMIN — ASPIRIN 81 MG: 81 TABLET, CHEWABLE ORAL at 08:46

## 2023-02-27 RX ADMIN — ROSUVASTATIN CALCIUM 20 MG: 10 TABLET, COATED ORAL at 21:00

## 2023-02-27 RX ADMIN — CHLORHEXIDINE GLUCONATE 15 ML: 1.2 RINSE ORAL at 21:01

## 2023-02-27 RX ADMIN — MUPIROCIN: 20 OINTMENT TOPICAL at 13:13

## 2023-02-27 RX ADMIN — EZETIMIBE 10 MG: 10 TABLET ORAL at 08:46

## 2023-02-27 RX ADMIN — CHLORHEXIDINE GLUCONATE 15 ML: 1.2 RINSE ORAL at 14:25

## 2023-02-27 RX ADMIN — LOSARTAN POTASSIUM 50 MG: 50 TABLET, FILM COATED ORAL at 21:00

## 2023-02-27 NOTE — PROGRESS NOTES
Bedside and Verbal shift change report given to [facility] (oncoming nurse) by Santos Goncalves RN (offgoing nurse). Report included the following information SBAR. This patient was assisted with Intentional Toileting every 2 hours during this shift as appropriate. Documentation of ambulation and output reflected on Flowsheet as appropriate. Purposeful hourly rounding was completed using AIDET and 5Ps. Outcomes of PHR documented as they occurred. Bed alarm in use as appropriate. Dual Suction and ambubag in place.       Patient Vitals for the past 12 hrs:   Temp Pulse Resp BP SpO2   02/27/23 0459 98.4 °F (36.9 °C) 74 16 116/61 94 %   02/27/23 0010 97.5 °F (36.4 °C) 65 16 112/63 96 %   02/26/23 2312 -- (!) 55 -- -- --   02/26/23 2000 -- 70 -- -- --         Intake/Output Summary (Last 24 hours) at 2/27/2023 0750  Last data filed at 2/27/2023 0515  Gross per 24 hour   Intake 200 ml   Output 1200 ml   Net -1000 ml

## 2023-02-27 NOTE — PROGRESS NOTES
[facility]                    Cardiology Care Note     []Initial Encounter     [x]Follow-up    Patient Name: Jessie Kiser - NSX:[birth year] - OTU:[id number]  Primary Cardiologist: none   Consulting Cardiologist: [facility] Physicians: Perry Rowland MD     Reason for encounter: CP    HPI:       Jessie Kiser is a 76 y.o. male with PMH significant for HTN and HLD    Pt reports earlier today he felt a tightness in his chest and discomfort. Nothing seemed to aggravate or relieve the pain. Became concerned so came to the ED. He denies any palpitations, SOB, edema. No prior cardiac hx. Reports his father had \"heart problems\" but did not have a heart attack. Trop mildly elevated since admission, chest xray with hiatal hernia which pt was unaware of. Subjective:      Jessie Kiser reports no further CP, SOB, palpitations. Assessment and Plan     NSTEMI  Presented with chest tightness. EKG non-ischemic. However, his tropnins was significantly elevated this AM and continues to trend upward (12-->132-->360-->3,777-->2628). - Cont Heparin gtt per ACS protolol  - cont ASA 81 mg daily  - increased Crestor to 20 mg daily  - cont Losartan, keep BP<130/90  - TTE demonstrated EF 55-60% with evidence of moderate AS (mean 31 mmHg), mild AR  - implications, benefits, and risks associated with LHC was explained to patient  - C today    2. CP:   could be partially caused by mod large hiatal hernia. Ruled in with Troponins.  - management as noted above    3. Aortic stenosis  - see above    4. Hiatal hernia: mod large seen on chest xray    5. HTN: on micardis PTA     6. HLD: on crestor , Zetia          Pt personally seen and examined. Chart reviewed. Agree with advanced NP's history, exam and  A/P with changes/additons. Blood pressure (!) 104/42, pulse (!) 56, temperature 97.8 °F (36.6 °C), resp.  rate 15, height 5' 11\" (1.803 m), weight 229 lb 15 oz (104.3 kg), SpO2 93 %. Alert/Not in acute distress  Neck - no JVD  CVS - S1 S2 +; Reg; 2/6 sys murmur+  RS : CTAB  Abd: Soft/BS+  LE - no edema    A/P :    NSTEMI - 2 V Dz on [facility]  Aortic Stenosis - mod to severe by echo  HTN  HLD    Transfer to [facility] for CABG/AVR - d/w Dr Jannet Petersen      Discussed with patient/nursing/family ( wife)    Leia Mason MD, [facility]      ____________________________________________________________    Cardiac testing    02/24/23    ECHO ADULT COMPLETE 02/25/2023 2/25/2023    Interpretation Summary    Left Ventricle: Normal left ventricular systolic function with a visually estimated EF of 55 - 60%. Left ventricle size is normal. Normal wall thickness. Normal wall motion. Grade I diastolic dysfunction with normal LAP. Aortic Valve: Valve structure is normal. Moderately calcified cusp. Moderate sclerosis of the aortic valve cusp. Mild regurgitation. Moderate stenosis of the aortic valve. AV mean gradient is 31 mmHg. AV peak velocity is 3.6 m/s. SALLY ( planimetry) 1.1 sq cm    Signed by: Sherie Blevins MD on 2/25/2023  1:42 PM      Most recent HS troponins:  Recent Labs     02/25/23  1151 02/25/23  0228 02/24/23  1705   TROPHS 2,628* 3,777* 360*       ECG: normal sinus rhythm    Review of Systems:    [x]All other systems reviewed and all negative except as written in HPI    [] Patient unable to provide secondary to condition    Past Medical History:   Diagnosis Date    Hypercholesterolemia     Hypertension      No past surgical history on file. Social Hx:  reports that he has quit smoking. His smoking use included cigarettes. He has never used smokeless tobacco. He reports that he does not currently use alcohol. Family Hx: family history includes Heart Disease in his father.   No Known Allergies       OBJECTIVE:  Wt Readings from Last 3 Encounters:   02/25/23 104.3 kg (229 lb 15 oz)       Intake/Output Summary (Last 24 hours) at 2/27/2023 [address] filed at 2/27/2023 0515  Gross per 24 hour   Intake 200 ml   Output 1200 ml   Net -1000 ml       Physical Exam:    Vitals:   Vitals:    02/27/23 0010 02/27/23 0459 02/27/23 0700 02/27/23 0746   BP: 112/63 116/61  135/62   Pulse: 65 74 66 65   Resp: 16 16  20   Temp: 97.5 °F (36.4 °C) 98.4 °F (36.9 °C)  97.8 °F (36.6 °C)   SpO2: 96% 94%  95%   Weight:       Height:         Telemetry: normal sinus rhythm    Gen: Well-developed, well-nourished, in no acute distress  Neck: Supple, No JVD, No Carotid Bruit  Resp: No accessory muscle use, Clear breath sounds, No rales or rhonchi  Card: Regular Rate,Rythm, Normal S1, S2, 2/6 murmurs, no rubs or gallop. No thrills. Abd:   Soft, non-tender, non-distended, BS+   MSK: No cyanosis  Skin: No rashes    Neuro: Moving all four extremities, follows commands appropriately  Psych: Good insight, oriented to person, place, alert, Nml Affect  LE: No edema    Data Review:     Radiology:   XR Results (most recent):  Results from Hospital Encounter encounter on 02/24/23    XR CHEST PA LAT    Narrative  INDICATION:  chest pain. EXAM: 2 VIEW CHEST RADIOGRAPH. COMPARISON: None. FINDINGS: Frontal and lateral views of the chest show a moderately large hiatal  hernia. No focal infiltrate or effusion. No CHF pattern. . . Minimal bibasilar  atelectasis. The heart, mediastinum and pulmonary vasculature are upper normal.  The bony thorax is unremarkable for age. ..    Impression  Minimal bibasilar atelectasis. Moderately large hiatal hernia. .      Recent Labs     02/25/23  0228 02/24/23  1224    138   K 4.0 3.9    106   CO2 28 29   BUN 13 17   CREA 0.79 1.04   GLU 97 103*   PHOS 3.4  --    CA 8.5 9.2     Recent Labs     02/27/23  0510 02/26/23  0006   WBC 7.1 6.6   HGB 12.4 11.9*   HCT 37.7 36.8   * 139*     Recent Labs     02/25/23 0228 02/24/23  1224   PTP  --  10.6   INR  --  1.0   AP 48 59     Recent Labs     02/25/23 0228   CHOL 110   LDLC 44.2 Current meds:    Current Facility-Administered Medications:     rosuvastatin (CRESTOR) tablet 20 mg, 20 mg, Oral, QHS, Keri Melgar MD, 20 mg at 02/26/23 2051    nitroglycerin (NITROSTAT) tablet 0.4 mg, 0.4 mg, SubLINGual, Q5MIN PRN, Jame Irving MD, 0.4 mg at 02/24/23 1240    morphine injection 2 mg, 2 mg, IntraVENous, Q4H PRN, Jame Irving MD    aspirin chewable tablet 81 mg, 81 mg, Oral, DAILY, Jame Irving MD, 81 mg at 02/26/23 0900    heparin 25,000 units in D5W 250 ml infusion, 9-25 Units/kg/hr, IntraVENous, TITRATE, Jame Irving MD, Last Rate: 9.4 mL/hr at 02/27/23 0719, 9 Units/kg/hr at 02/27/23 0719    labetaloL (NORMODYNE;TRANDATE) 20 mg/4 mL (5 mg/mL) injection 20 mg, 20 mg, IntraVENous, Q4H PRN, Jame Irving MD    ezetimibe (ZETIA) tablet 10 mg, 10 mg, Oral, DAILY, Aviva Irving MD, 10 mg at 02/26/23 0900    losartan (COZAAR) tablet 50 mg, 50 mg, Oral, QHS, Aviva Irving MD, 50 mg at 02/26/23 2049    0.9% sodium chloride infusion, 50 mL/hr, IntraVENous, CONTINUOUS, Aviva Irving MD, Last Rate: 50 mL/hr at 02/24/23 2040, 50 mL/hr at 02/24/23 2040    sodium chloride (NS) flush 5-40 mL, 5-40 mL, IntraVENous, Q8H, Aviva Irving MD, 10 mL at 02/27/23 0522    sodium chloride (NS) flush 5-40 mL, 5-40 mL, IntraVENous, PRN, Aviva Irving MD    0.9% sodium chloride infusion 25 mL, 25 mL, IntraVENous, PRN, Aviva Irving MD    acetaminophen (TYLENOL) tablet 650 mg, 650 mg, Oral, Q6H PRN **OR** acetaminophen (TYLENOL) suppository 650 mg, 650 mg, Rectal, Q6H PRN, Jame Irving MD    bisacodyL (DULCOLAX) suppository 10 mg, 10 mg, Rectal, DAILY PRN, Jame Irving MD    promethazine (PHENERGAN) tablet 12.5 mg, 12.5 mg, Oral, Q6H PRN **OR** ondansetron (ZOFRAN) injection 4 mg, 4 mg, IntraVENous, Q6H PRN, Do, MD Jesse Whyte, WAQAR    [facility] Card[facility]  Call center: Staci Sanchez) [phone]  (F) [phone]      Tosin Grimaldo MD

## 2023-02-27 NOTE — PROGRESS NOTES
Bedside and Verbal shift change report given to 25 [facility] (oncoming nurse) by Roel Stevens RN (offgoing nurse). Report included the following information SBAR. This patient was assisted with Intentional Toileting every 2 hours during this shift as appropriate. Documentation of ambulation and output reflected on Flowsheet as appropriate. Purposeful hourly rounding was completed using AIDET and 5Ps. Outcomes of PHR documented as they occurred. Bed alarm in use as appropriate. Dual Suction and ambubag in place.       Patient Vitals for the past 12 hrs:   Temp Pulse Resp BP SpO2   02/27/23 0459 98.4 °F (36.9 °C) 74 16 116/61 94 %   02/27/23 0010 97.5 °F (36.4 °C) 65 16 112/63 96 %   02/26/23 2312 -- (!) 55 -- -- --   02/26/23 2000 -- 70 -- -- --         Intake/Output Summary (Last 24 hours) at 2/27/2023 0746  Last data filed at 2/27/2023 0515  Gross per 24 hour   Intake 200 ml   Output 1200 ml   Net -1000 ml

## 2023-02-27 NOTE — PROGRESS NOTES
Problem: General Medical Care Plan  Goal: *Vital signs within specified parameters  Outcome: Progressing Towards Goal  Goal: *Labs within defined limits  Outcome: Progressing Towards Goal  Goal: *Absence of infection signs and symptoms  Outcome: Progressing Towards Goal  Goal: *Optimal pain control at patient's stated goal  Outcome: Progressing Towards Goal  Goal: *Skin integrity maintained  Outcome: Progressing Towards Goal  Goal: *Fluid volume balance  Outcome: Progressing Towards Goal  Goal: *Optimize nutritional status  Outcome: Progressing Towards Goal  Goal: *Anxiety reduced or absent  Outcome: Progressing Towards Goal  Goal: *Progressive mobility and function (eg: ADL's)  Outcome: Progressing Towards Goal     Problem: Patient Education: Go to Patient Education Activity  Goal: Patient/Family Education  Outcome: Progressing Towards Goal     Problem: Patient Education: Go to Patient Education Activity  Goal: Patient/Family Education  Outcome: Progressing Towards Goal     Problem: Unstable angina/NSTEMI: Day of Admission/Day 1  Goal: Off Pathway (Use only if patient is Off Pathway)  Outcome: Progressing Towards Goal  Goal: Activity/Safety  Outcome: Progressing Towards Goal  Goal: Consults, if ordered  Outcome: Progressing Towards Goal  Goal: Diagnostic Test/Procedures  Outcome: Progressing Towards Goal  Goal: Nutrition/Diet  Outcome: Progressing Towards Goal  Goal: Discharge Planning  Outcome: Progressing Towards Goal  Goal: Medications  Outcome: Progressing Towards Goal  Goal: Respiratory  Outcome: Progressing Towards Goal  Goal: Treatments/Interventions/Procedures  Outcome: Progressing Towards Goal  Goal: Psychosocial  Outcome: Progressing Towards Goal  Goal: *Hemodynamically stable  Outcome: Progressing Towards Goal  Goal: *Optimal pain control at patient's stated goal  Outcome: Progressing Towards Goal  Goal: *Lungs clear or at baseline  Outcome: Progressing Towards Goal     Problem: Unstable angina/NSTEMI: Day 2  Goal: Off Pathway (Use only if patient is Off Pathway)  Outcome: Progressing Towards Goal  Goal: Activity/Safety  Outcome: Progressing Towards Goal  Goal: Consults, if ordered  Outcome: Progressing Towards Goal  Goal: Diagnostic Test/Procedures  Outcome: Progressing Towards Goal  Goal: Nutrition/Diet  Outcome: Progressing Towards Goal  Goal: Discharge Planning  Outcome: Progressing Towards Goal  Goal: Medications  Outcome: Progressing Towards Goal  Goal: Respiratory  Outcome: Progressing Towards Goal  Goal: Treatments/Interventions/Procedures  Outcome: Progressing Towards Goal  Goal: Psychosocial  Outcome: Progressing Towards Goal  Goal: *Hemodynamically stable  Outcome: Progressing Towards Goal  Goal: *Optimal pain control at patient's stated goal  Outcome: Progressing Towards Goal  Goal: *Lungs clear or at baseline  Outcome: Progressing Towards Goal     Problem: Unstable Angina/NSTEMI: Discharge Outcomes  Goal: *Hemodynamically stable  Outcome: Progressing Towards Goal  Goal: *Stable cardiac rhythm  Outcome: Progressing Towards Goal  Goal: *Lungs clear or at baseline  Outcome: Progressing Towards Goal  Goal: *Optimal pain control at patient's stated goal  Outcome: Progressing Towards Goal  Goal: *Identifies cardiac risk factors  Outcome: Progressing Towards Goal  Goal: *Verbalizes home exercise program, activity guidelines, cardiac precautions  Outcome: Progressing Towards Goal  Goal: *Verbalizes understanding and describes prescribed diet  Outcome: Progressing Towards Goal  Goal: *Verbalizes name, dosage, time, side effects, and number of days to continue medications  Outcome: Progressing Towards Goal  Goal: *Anxiety reduced or absent  Outcome: Progressing Towards Goal  Goal: *Understands and describes signs and symptoms to report to providers(Stroke Metric)  Outcome: Progressing Towards Goal  Goal: *Describes follow-up/return visits to physicians  Outcome: Progressing Towards Goal  Goal: *Describes available resources and support systems  Outcome: Progressing Towards Goal  Goal: *Influenza immunization  Outcome: Progressing Towards Goal  Goal: *Pneumococcal immunization  Outcome: Progressing Towards Goal  Goal: *Describes smoking cessation resources  Outcome: Progressing Towards Goal

## 2023-02-27 NOTE — DISCHARGE SUMMARY
Discharge Summary       PATIENT ID: Tyrone Pedroza  MRN: [id number]   YOB: 1945    DATE OF ADMISSION: 2/24/2023 12:31 PM    DATE OF DISCHARGE: 2/27/2023 2:18 PM  PRIMARY CARE PROVIDER: Dev Carbone MD     ATTENDING PHYSICIAN: Hillary Galvez MD  DISCHARGING PROVIDER: Hillary Galvez MD    To contact this individual call [phone] and ask the  to page. If unavailable ask to be transferred the Adult Hospitalist Department. CONSULTATIONS: IP CONSULT TO CARDIOLOGY  IP CONSULT TO CARDIOLOGY    PROCEDURES/SURGERIES: Procedure(s) with comments:  LEFT HEART CATH / CORONARY ANGIOGRAPHY  FRACTIONAL FLOW RESERVE - Enoc Durant [id number] COURSE:   The patient is a 75 yo hx of HTN, hyperlipidemia, presented w/ chest pain, NSTEMI. The patient c/o \"chest tightness\" today. The pain was non-radiating, not associated with dyspnea or diaphoresis. In the ED, his chest pain has improved. EKG non-ischemic, but Trop increased from 0 to 132. [facility] with multivessel disease, patient needing transfer to [facility] for evaluation of CABG and AVR      DISCHARGE DIAGNOSES / PLAN:       75 yo hx of HTN, hyperlipidemia, presented w/ chest pain, NSTEMI:     NSTEMI  [facility] with MVCAD   Will transfer to [facility] for CABG and AVR  Will be continued on heparin drip with close monitoring of APTT and hemoglobin. Cardiac meds being optimized  Increased Crestor and losartan. Continue with aspirin. Not currently on a beta-blocker which I will defer to cardiology. Repeat troponin and EKG with any further chest pain or chest tightness. Moderate aortic stenosis  Management per cardiology. - transfer to [facility]     Essential hypertension  Monitor and titrate medications accordingly. Hyperlipidemia  Statin therapy with increased Crestor as above. Total time spent on critical care 31 minutes.                                                                                          Care Plan discussed with: Patient. Prophylaxis: Heparin drip. PENDING TEST RESULTS:   At the time of discharge the following test results are still pending: None    FOLLOW UP APPOINTMENTS:    Follow-up Information       Follow up With Specialties Details Why Contact Info    RAOUL Salgado Nurse Practitioner, Cardiovascular Disease Physician Follow up on 3/14/2023 1:40 pm [address] [place] [address] [phone]      Rosemarie Giraldo MD Family Medicine   [address] [place], [place] [address]  [address]  [address] [place] [address]  [phone]               ADDITIONAL CARE RECOMMENDATIONS: Transfer to Dr. Brisa Posey at [facility] for CABG and AVR evaluation    DIET: Cardiac Diet ADULT DIET Regular; 3 carb choices (45 gm/meal); Low Fat/Low Chol/High Fiber/2 gm Na    ACTIVITY: PT/OT Eval and Treat    WOUND CARE: None      DISCHARGE MEDICATIONS:  Current Discharge Medication List        CONTINUE these medications which have NOT CHANGED    Details   rosuvastatin (CRESTOR) 10 mg tablet Take 10 mg by mouth nightly.      ezetimibe (ZETIA) 10 mg tablet Take 10 mg by mouth. telmisartan (MICARDIS) 20 mg tablet Take 20 mg by mouth daily. NOTIFY YOUR PHYSICIAN FOR ANY OF THE FOLLOWING:   Fever over 101 degrees for 24 hours. Chest pain, shortness of breath, fever, chills, nausea, vomiting, diarrhea, change in mentation, falling, weakness, bleeding. Severe pain or pain not relieved by medications. Or, any other signs or symptoms that you may have questions about.     DISPOSITION:   Acute Facility        Home With:   OT  PT  HH  RN       Long term SNF/Inpatient Rehab    Independent/assisted living    Hospice   x Other:       PATIENT CONDITION AT DISCHARGE:     Functional status    Poor    x Deconditioned     Independent      Cognition   x  Lucid     Forgetful     Dementia      Catheters/lines (plus indication)    Lyons     PICC     PEG    x None      Code status Full Code       PHYSICAL EXAMINATION AT DISCHARGE:    General Colonpushpa Melvi  alert x 3, awake, no acute distress,   HEENT: PEERL, EOMI, moist mucus membrane, TM clear  Neck: supple, no JVD, no meningeal signs  Chest: Clear to auscultation bilaterally   CVS: + Murmur, S1 S2 heard, Capillary refill less than 2 seconds  Abd: soft/ Non tender, non distended, BS physiological,   Ext: no clubbing, no cyanosis, no edema, brisk 2+ DP pulses  Neuro/Psych: pleasant mood and affect, CN 2-12 grossly intact, sensory grossly within normal limit, Strength 5/5 in all extremities, DTR 1+ x 4  Skin: warm     CHRONIC MEDICAL DIAGNOSES:  Problem List as of 2/27/2023 Date Reviewed: 2/24/2023            Codes Class Noted - Resolved    * (Principal) NSTEMI (non-ST elevated myocardial infarction) [facility]) ICD-10-CM: I21.4  ICD-9-CM: 410.70  2/24/2023 - Present        Chest pain ICD-10-CM: R07.9  ICD-9-CM: 786.50  2/24/2023 - Present           Greater than 31 minutes were spent with the patient on counseling and coordination of care    Signed:   Katia Kim MD  2/27/2023  2:18 PM

## 2023-02-27 NOTE — PROCEDURES
BRIEF PROCEDURE NOTE    Date of Procedure: 2/27/2023   Preoperative Diagnosis: NSTEMI/Mod to severe AS  Postoperative Diagnosis: Sig 2 V dz  Procedure: Left heart cath, LV angiography, coronary angiography  Interventional Cardiologist: Cecilia Lezama MD  Assistant : none  Anesthesia: local + IV sedation  I administered moderate sedation throughout this procedure. An independent trained observer pushed medications at my direction, and monitored the patients level of consciousness and physiological status throughout. Estimated Blood Loss: Minimal    Access: R Radial Access - 6 F sheath       Difficulty advancing guide wire radial artery - angiogram; Small vessel/tortuos/lesion ( forearm) vs vasospasm    All catheter exchanges performed over guide wire    Difficult advance  6 F EBU guide for iFR ; Able to advance 5 F nowak 3.5 guide    Catheters: RCA : JR4                    LCA : JL3.5    Findings:     L Main:Long/ Med to Large; MLI;     LAD: Med; Prox diffuse 60-70%; Mid - diffuse 70%     RI - Med; MLI    LCflex: Med; Mid 50%; Very small OM1; OM2 - Med to large; ostial/proximal 50%    RCA: Prox/ Mid - Med to Large ; Distal - Med; 70%;  PDA  and PLB - small to med; MLI    LVEDP: Valve not crossed    LVEF: Not assessed    No significant gradient across aortic valve. PCI: JL3.5 Nowak guide  iFR  LAD - significant - 0.77      Specimens Removed : None    Devices implanted : None    Complications: None    Closure Device: R Radial - TR band    Rec; CTS consult         See full cath note.     Complications: none    Cecilia Lezama MD       Add - D/w Dr Dawn Teton - transfer to hospitalist service at [facility]

## 2023-02-27 NOTE — PROGRESS NOTES
NP notified, via [employer] regarding platelets <307 K as per order. Anti-Xa not resulted at this time. New orders not received at this time.

## 2023-02-27 NOTE — PROGRESS NOTES
Medicare pt has received, reviewed, and signed 2nd IM letter informing them of their right to appeal the discharge. Signed copy has been placed on pt bedside chart.   Joy Aguilar CMS

## 2023-02-27 NOTE — PROGRESS NOTES
Steve Andrade [facility] [address]  [address] [facility], [address] [place] [address]  [phone]      Medical Progress Note      NAME: Thea Page   :  1945  MRM:  [id number]    Date/Time: 2023  2:05 PM           Assessment / Plan:      77 yo hx of HTN, hyperlipidemia, presented w/ chest pain, NSTEMI:     NSTEMI  [facility] with MVCAD   Will transfer to [facility] for CABG and AVR  Will be continued on heparin drip with close monitoring of APTT and hemoglobin. Cardiac meds being optimized  Increased Crestor and losartan. Continue with aspirin. Not currently on a beta-blocker which I will defer to cardiology. Repeat troponin and EKG with any further chest pain or chest tightness. Moderate aortic stenosis  Management per cardiology. - transfer to [facility]     Essential hypertension  Monitor and titrate medications accordingly. Hyperlipidemia  Statin therapy with increased Crestor as above. Total time spent on critical care 31 minutes. Care Plan discussed with: Patient. Prophylaxis: Heparin drip. Disposition:  Home w/Family           ___________________________________________________    Attending Physician: Jacquelyn Tejada MD        Subjective:     Chief Complaint: Lying comfortably in bed. No current chest tightness or chest pain. No current nausea or vomiting or diaphoresis. Patient disappointed he needs to stay another night for cath to be done on Monday. ROS:  Rest of review of systems negative otherwise. Objective:       Vitals:     Last 24hrs VS reviewed since prior progress note. Most recent are:    Visit Vitals  /60 (BP 1 Location: Left upper arm, BP Patient Position: Semi fowlers; Lying)   Pulse (!) 58   Temp 97.5 °F (36.4 °C)   Resp 20   Ht 5' 11\" (1.803 m)   Wt 104.3 kg (229 lb 15 oz)   SpO2 98%   BMI 32.07 kg/m²     SpO2 Readings from Last 6 Encounters:   23 98%    O2 Flow Rate (L/min): 2 l/min     Intake/Output Summary (Last 24 hours) at 2/27/2023 1423  Last data filed at 2/27/2023 0515  Gross per 24 hour   Intake --   Output 1200 ml   Net -1200 ml            Exam:     Physical Exam:    Gen:  No acute distress. HEENT:  NC/AT. Pink conjunctivae, hearing intact to voice, moist mucous membranes. Neck:  Supple. Resp:  Unlabored breathing. Clear breath sounds with good air entry. No wheezes rales or rhonchi. Card:  Regular rate and rhythm. Normal S1 and S2. No murmurs. Abd:  Soft, non-tender, non-distended, normoactive bowel sounds. Ext: No clubbing, cyanosis or edema. Skin:  No rashes or ulcers, skin turgor is good. Neuro:  Moving all extremities with no gross focal deficits appreciated. Psych:  Good insight, oriented to person, place and time, alert.     Medications Reviewed: (see below)    Lab Data Reviewed: (see below)  ______________________________________________________________________    Medications:     Current Facility-Administered Medications   Medication Dose Route Frequency    sodium chloride (NS) flush 5-40 mL  5-40 mL IntraVENous Q8H    sodium chloride (NS) flush 5-40 mL  5-40 mL IntraVENous PRN    0.9% sodium chloride infusion  75 mL/hr IntraVENous CONTINUOUS    mupirocin (BACTROBAN) 2 % ointment   Both Nostrils Q12H    chlorhexidine (PERIDEX) 0.12 % mouthwash 15 mL  15 mL Oral Q12H    rosuvastatin (CRESTOR) tablet 20 mg  20 mg Oral QHS    nitroglycerin (NITROSTAT) tablet 0.4 mg  0.4 mg SubLINGual Q5MIN PRN    morphine injection 2 mg  2 mg IntraVENous Q4H PRN    aspirin chewable tablet 81 mg  81 mg Oral DAILY    labetaloL (NORMODYNE;TRANDATE) 20 mg/4 mL (5 mg/mL) injection 20 mg  20 mg IntraVENous Q4H PRN    ezetimibe (ZETIA) tablet 10 mg  10 mg Oral DAILY    losartan (COZAAR) tablet 50 mg  50 mg Oral QHS    0.9% sodium chloride infusion  50 mL/hr IntraVENous CONTINUOUS    sodium chloride (NS) flush 5-40 mL  5-40 mL IntraVENous Q8H    sodium chloride (NS) flush 5-40 mL  5-40 mL IntraVENous PRN    0.9% sodium chloride infusion 25 mL  25 mL IntraVENous PRN    acetaminophen (TYLENOL) tablet 650 mg  650 mg Oral Q6H PRN    Or    acetaminophen (TYLENOL) suppository 650 mg  650 mg Rectal Q6H PRN    bisacodyL (DULCOLAX) suppository 10 mg  10 mg Rectal DAILY PRN    promethazine (PHENERGAN) tablet 12.5 mg  12.5 mg Oral Q6H PRN    Or    ondansetron (ZOFRAN) injection 4 mg  4 mg IntraVENous Q6H PRN        Lab Review:     Recent Labs     02/27/23  0510 02/26/23  0006 02/25/23 0228   WBC 7.1 6.6 6.0   HGB 12.4 11.9* 12.1   HCT 37.7 36.8 36.9   * 139* 146*       Recent Labs     02/25/23 0228      K 4.0      CO2 28   GLU 97   BUN 13   CREA 0.79   CA 8.5   MG 1.9   PHOS 3.4   ALB 3.4*   ALT 31       No components found for: [place]

## 2023-02-27 NOTE — PROGRESS NOTES
NP notified, via [employer] regarding platelets <595 K as per order. Anti-Xa not resulted at this time. New orders not received at this time.

## 2023-02-27 NOTE — ROUTINE PROCESS
10:09 AM  TRANSFER - IN REPORT:    Verbal report received from Adrian (name) on Olancha Flight  being received from cath lab(unit) for routine post - op      Report consisted of patients Situation, Background, Assessment and   Recommendations(SBAR). Information from the following report(s) Procedure Summary was reviewed with the receiving nurse. Opportunity for questions and clarification was provided. Assessment completed upon patients arrival to unit and care assumed. 11:00 AM  1 ml air released from TR Band. No bleeding or hematoma noted. Radial and Ulnar pulse on right wrist palpable. Pt tolerated well. Will continue to monitor. 11:05 AM  2 ml air released from TR Band. No bleeding or hematoma noted. Radial and Ulnar pulse on right wrist palpable. Pt tolerated well. Will continue to monitor. 11:10 AM  3 ml air released from TR Band. No bleeding or hematoma noted. Radial and Ulnar pulse on right wrist palpable. Pt tolerated well. Will continue to monitor. 11:15 AM  5 ml air released from TR Band. No bleeding or hematoma noted. Radial and Ulnar pulse on right wrist palpable. Pt tolerated well. Will continue to monitor. Air release completed. TR Band removed from right wrist. No bleeding or  Hematoma. Dressing applied. Wrist immobilizer in place. Radial and ulnar pulse remain palpable on affected extremity. Pt tolerated well. Instructions given to pt regarding movement and activity restrictions. Pt voiced understanding. 12:04 PM  TRANSFER - OUT REPORT:    Bedside report given to shey(love) on [facility]cy Flight  being transferred to [facility](unit) for routine progression of care       Report consisted of patients Situation, Background, Assessment and   Recommendations(SBAR). Information from the following report(s) Procedure Summary was reviewed with the receiving nurse.     Lines:   Peripheral IV 02/24/23 Left Antecubital (Active)   Site Assessment Clean, dry, & intact 02/27/23 0731   Phlebitis Assessment 0 02/27/23 0731   Infiltration Assessment 0 02/27/23 0731   Dressing Status Clean, dry, & intact 02/27/23 0731   Dressing Type Transparent 02/27/23 0731   Hub Color/Line Status Pink; Infusing 02/27/23 0731   Action Taken Blood drawn 02/27/23 0731   Alcohol Cap Used No 02/27/23 0731       Peripheral IV 02/24/23 Posterior;Right Hand (Active)   Site Assessment Clean, dry, & intact 02/27/23 0731   Phlebitis Assessment 0 02/27/23 0731   Infiltration Assessment 0 02/27/23 0731   Dressing Status Clean, dry, & intact 02/27/23 0731   Dressing Type Tape;Transparent 02/27/23 0731   Hub Color/Line Status Pink 02/27/23 0731   Action Taken Open ports on tubing capped 02/27/23 0731   Alcohol Cap Used Yes 02/27/23 0731        Opportunity for questions and clarification was provided. Patient transported with:   Registered Nurse    Site check with [facility] RN, right wrist clean, dry, and intact with no bleeding or hematoma noted.

## 2023-02-27 NOTE — PROGRESS NOTES
Bedside and Verbal shift change report given to [facility] (oncoming nurse) by Jesus Adams (offgoing nurse). Report included the following information SBAR, Kardex, ED Summary, Procedure Summary, Intake/Output, MAR, Recent Results, and Cardiac Rhythm NSR . TRANSFER - OUT REPORT:    Verbal report given to Maricruz(name) on Sarahi Holland  being transferred to [facility](unit) for ordered procedure       Report consisted of patients Situation, Background, Assessment and   Recommendations(SBAR). Information from the following report(s) SBAR, Kardex, Procedure Summary, Intake/Output, MAR, Recent Results, and Cardiac Rhythm NSR  was reviewed with the receiving nurse. Lines:   Peripheral IV 02/24/23 Left Antecubital (Active)   Site Assessment Clean, dry, & intact 02/27/23 0731   Phlebitis Assessment 0 02/27/23 0731   Infiltration Assessment 0 02/27/23 0731   Dressing Status Clean, dry, & intact 02/27/23 0731   Dressing Type Transparent 02/27/23 0731   Hub Color/Line Status Pink; Infusing 02/27/23 0731   Action Taken Blood drawn 02/27/23 0731   Alcohol Cap Used No 02/27/23 0731       Peripheral IV 02/24/23 Posterior;Right Hand (Active)   Site Assessment Clean, dry, & intact 02/27/23 0731   Phlebitis Assessment 0 02/27/23 0731   Infiltration Assessment 0 02/27/23 0731   Dressing Status Clean, dry, & intact 02/27/23 0731   Dressing Type Tape;Transparent 02/27/23 0731   Hub Color/Line Status Pink 02/27/23 0731   Action Taken Open ports on tubing capped 02/27/23 0731   Alcohol Cap Used Yes 02/27/23 0731        Opportunity for questions and clarification was provided. Patient transported with:   Monitor    Bedside and Verbal shift change report given to Tristian Torres (oncoming nurse) by [facility] (offgoing nurse). Report included the following information SBAR, Kardex, ED Summary, Procedure Summary, Intake/Output, MAR, Recent Results, and Cardiac Rhythm NSR .

## 2023-02-27 NOTE — PROGRESS NOTES
02/27/23 1322   Bedside Spirometry   FEV-1/Actual(liters) 2.19 Liters   FEV-1/ Predicted (liters) 2.96 Liters   FVC (liters) 2.77 Liters   Patient Postioning In bed at a 30 degree angle   Patient Effort Well;Reproducable   PEF = 514 L/Min (110% of predicted)  FEV1/FVC = 79%

## 2023-02-27 NOTE — ROUTINE PROCESS
10:09 AM  TRANSFER - IN REPORT:    Verbal report received from Adrian (name) on Espinoza Drafts  being received from cath lab(unit) for routine post - op      Report consisted of patients Situation, Background, Assessment and   Recommendations(SBAR). Information from the following report(s) Procedure Summary was reviewed with the receiving nurse. Opportunity for questions and clarification was provided. Assessment completed upon patients arrival to unit and care assumed. 11:00 AM  1 ml air released from TR Band. No bleeding or hematoma noted. Radial and Ulnar pulse on right wrist palpable. Pt tolerated well. Will continue to monitor. 11:05 AM  2 ml air released from TR Band. No bleeding or hematoma noted. Radial and Ulnar pulse on right wrist palpable. Pt tolerated well. Will continue to monitor. 11:10 AM  3 ml air released from TR Band. No bleeding or hematoma noted. Radial and Ulnar pulse on right wrist palpable. Pt tolerated well. Will continue to monitor. 11:15 AM  5 ml air released from TR Band. No bleeding or hematoma noted. Radial and Ulnar pulse on right wrist palpable. Pt tolerated well. Will continue to monitor. Air release completed. TR Band removed from right wrist. No bleeding or  Hematoma. Dressing applied. Wrist immobilizer in place. Radial and ulnar pulse remain palpable on affected extremity. Pt tolerated well. Instructions given to pt regarding movement and activity restrictions. Pt voiced understanding. 12:04 PM  TRANSFER - OUT REPORT:    Bedside report given to shey(love) on Espinozarosi Johns  being transferred to [facility](unit) for routine progression of care       Report consisted of patients Situation, Background, Assessment and   Recommendations(SBAR). Information from the following report(s) Procedure Summary was reviewed with the receiving nurse.     Lines:   Peripheral IV 02/24/23 Left Antecubital (Active)   Site Assessment Clean, dry, & intact 02/27/23 0731   Phlebitis Assessment 0 02/27/23 0731   Infiltration Assessment 0 02/27/23 0731   Dressing Status Clean, dry, & intact 02/27/23 0731   Dressing Type Transparent 02/27/23 0731   Hub Color/Line Status Pink; Infusing 02/27/23 0731   Action Taken Blood drawn 02/27/23 0731   Alcohol Cap Used No 02/27/23 0731       Peripheral IV 02/24/23 Posterior;Right Hand (Active)   Site Assessment Clean, dry, & intact 02/27/23 0731   Phlebitis Assessment 0 02/27/23 0731   Infiltration Assessment 0 02/27/23 0731   Dressing Status Clean, dry, & intact 02/27/23 0731   Dressing Type Tape;Transparent 02/27/23 0731   Hub Color/Line Status Pink 02/27/23 0731   Action Taken Open ports on tubing capped 02/27/23 0731   Alcohol Cap Used Yes 02/27/23 0731        Opportunity for questions and clarification was provided. Patient transported with:   Registered Nurse    Site check with [facility] RN, right wrist clean, dry, and intact with no bleeding or hematoma noted.

## 2023-02-27 NOTE — DISCHARGE SUMMARY
Discharge Summary       PATIENT ID: Kaya Arcos  MRN: [id number]   YOB: 1945    DATE OF ADMISSION: 2/24/2023 12:31 PM    DATE OF DISCHARGE: 2/27/2023 2:18 PM  PRIMARY CARE PROVIDER: Chacho Garcia MD     ATTENDING PHYSICIAN: Kishor Lion MD  DISCHARGING PROVIDER: Kishor Lion MD    To contact this individual call [phone] and ask the  to page. If unavailable ask to be transferred the Adult Hospitalist Department. CONSULTATIONS: IP CONSULT TO CARDIOLOGY  IP CONSULT TO CARDIOLOGY    PROCEDURES/SURGERIES: Procedure(s) with comments:  LEFT HEART CATH / CORONARY ANGIOGRAPHY  FRACTIONAL FLOW RESERVE - Enoc Durant [id number] COURSE:   The patient is a 77 yo hx of HTN, hyperlipidemia, presented w/ chest pain, NSTEMI. The patient c/o \"chest tightness\" today. The pain was non-radiating, not associated with dyspnea or diaphoresis. In the ED, his chest pain has improved. EKG non-ischemic, but Trop increased from 0 to 132. [facility] with multivessel disease, patient needing transfer to [facility] for evaluation of CABG and AVR      DISCHARGE DIAGNOSES / PLAN:       77 yo hx of HTN, hyperlipidemia, presented w/ chest pain, NSTEMI:     NSTEMI  [facility] with MVCAD   Will transfer to [facility] for CABG and AVR  Will be continued on heparin drip with close monitoring of APTT and hemoglobin. Cardiac meds being optimized  Increased Crestor and losartan. Continue with aspirin. Not currently on a beta-blocker which I will defer to cardiology. Repeat troponin and EKG with any further chest pain or chest tightness. Moderate aortic stenosis  Management per cardiology. - transfer to [facility]     Essential hypertension  Monitor and titrate medications accordingly. Hyperlipidemia  Statin therapy with increased Crestor as above. Total time spent on critical care 31 minutes.                                                                                          Care Plan discussed with: Patient. Prophylaxis: Heparin drip. PENDING TEST RESULTS:   At the time of discharge the following test results are still pending: None    FOLLOW UP APPOINTMENTS:    Follow-up Information       Follow up With Specialties Details Why Contact Info    RAOUL Farrar Nurse Practitioner, Cardiovascular Disease Physician Follow up on 3/14/2023 1:40 pm [address] [facility] [address] [phone]      Carlos Houston MD Family Medicine   [address] [place] [address]  [address] [place] [address]  [phone]               ADDITIONAL CARE RECOMMENDATIONS: Transfer to Dr. Blu Merida at [facility] for CABG and AVR evaluation    DIET: Cardiac Diet ADULT DIET Regular; 3 carb choices (45 gm/meal); Low Fat/Low Chol/High Fiber/2 gm Na    ACTIVITY: PT/OT Eval and Treat    WOUND CARE: None      DISCHARGE MEDICATIONS:  Current Discharge Medication List        CONTINUE these medications which have NOT CHANGED    Details   rosuvastatin (CRESTOR) 10 mg tablet Take 10 mg by mouth nightly.      ezetimibe (ZETIA) 10 mg tablet Take 10 mg by mouth. telmisartan (MICARDIS) 20 mg tablet Take 20 mg by mouth daily. NOTIFY YOUR PHYSICIAN FOR ANY OF THE FOLLOWING:   Fever over 101 degrees for 24 hours. Chest pain, shortness of breath, fever, chills, nausea, vomiting, diarrhea, change in mentation, falling, weakness, bleeding. Severe pain or pain not relieved by medications. Or, any other signs or symptoms that you may have questions about.     DISPOSITION:   Acute Facility        Home With:   OT  PT  HH  RN       Long term SNF/Inpatient Rehab    Independent/assisted living    Hospice   x Other:       PATIENT CONDITION AT DISCHARGE:     Functional status    Poor    x Deconditioned     Independent      Cognition   x  Lucid     Forgetful     Dementia      Catheters/lines (plus indication)    [place]     PICC     PEG    x None      Code status Full Code       PHYSICAL EXAMINATION AT DISCHARGE:    [facility]  alert x 3, awake, no acute distress,   HEENT: PEERL, EOMI, moist mucus membrane, TM clear  Neck: supple, no JVD, no meningeal signs  Chest: Clear to auscultation bilaterally   CVS: + Murmur, S1 S2 heard, Capillary refill less than 2 seconds  Abd: soft/ Non tender, non distended, BS physiological,   Ext: no clubbing, no cyanosis, no edema, brisk 2+ DP pulses  Neuro/Psych: pleasant mood and affect, CN 2-12 grossly intact, sensory grossly within normal limit, Strength 5/5 in all extremities, DTR 1+ x 4  Skin: warm     CHRONIC MEDICAL DIAGNOSES:  Problem List as of 2/27/2023 Date Reviewed: 2/24/2023            Codes Class Noted - Resolved    * (Principal) NSTEMI (non-ST elevated myocardial infarction) [facility]) ICD-10-CM: I21.4  ICD-9-CM: 410.70  2/24/2023 - Present        Chest pain ICD-10-CM: R07.9  ICD-9-CM: 786.50  2/24/2023 - Present           Greater than 31 minutes were spent with the patient on counseling and coordination of care    Signed:   Emerson Egan MD  2/27/2023  2:18 PM

## 2023-02-27 NOTE — PROGRESS NOTES
Bedside and Verbal shift change report given to [facility] (oncoming nurse) by Arthur Harrison (offgoing nurse). Report included the following information SBAR, Kardex, ED Summary, Procedure Summary, Intake/Output, MAR, Recent Results, and Cardiac Rhythm NSR . TRANSFER - OUT REPORT:    Verbal report given to Maricruz(name) on Clayton Erickson  being transferred to [facility](unit) for ordered procedure       Report consisted of patients Situation, Background, Assessment and   Recommendations(SBAR). Information from the following report(s) SBAR, Kardex, Procedure Summary, Intake/Output, MAR, Recent Results, and Cardiac Rhythm NSR  was reviewed with the receiving nurse. Lines:   Peripheral IV 02/24/23 Left Antecubital (Active)   Site Assessment Clean, dry, & intact 02/27/23 0731   Phlebitis Assessment 0 02/27/23 0731   Infiltration Assessment 0 02/27/23 0731   Dressing Status Clean, dry, & intact 02/27/23 0731   Dressing Type Transparent 02/27/23 0731   Hub Color/Line Status Pink; Infusing 02/27/23 0731   Action Taken Blood drawn 02/27/23 0731   Alcohol Cap Used No 02/27/23 0731       Peripheral IV 02/24/23 Posterior;Right Hand (Active)   Site Assessment Clean, dry, & intact 02/27/23 0731   Phlebitis Assessment 0 02/27/23 0731   Infiltration Assessment 0 02/27/23 0731   Dressing Status Clean, dry, & intact 02/27/23 0731   Dressing Type Tape;Transparent 02/27/23 0731   Hub Color/Line Status Pink 02/27/23 0731   Action Taken Open ports on tubing capped 02/27/23 0731   Alcohol Cap Used Yes 02/27/23 0731        Opportunity for questions and clarification was provided. Patient transported with:   Monitor    Bedside and Verbal shift change report given to Nell Polanco (oncoming nurse) by [facility] (offgoing nurse). Report included the following information SBAR, Kardex, ED Summary, Procedure Summary, Intake/Output, MAR, Recent Results, and Cardiac Rhythm NSR .

## 2023-02-27 NOTE — CARDIO/PULMONARY
[facility] Cardiopulmonary Rehab    OPCR chart review    77 yo male from [place] presented 2/24/23 with chest tightness/NSTEMI. PMH includes HTN, HLD, hiatal hernia. Echo 2/25/23 revealed EF 55-60% with moderate aortic stenosis. Cardiac cath 2/27/23 revealed multivessel disease with recommendation for CTS consult. Currently, pt is not a candidate for OPCR as treatment for CAD/AS is pending.

## 2023-02-27 NOTE — PROGRESS NOTES
Medicare pt has received, reviewed, and signed 2nd IM letter informing them of their right to appeal the discharge. Signed copy has been placed on pt bedside chart.   Jeevan Gave CMS

## 2023-02-27 NOTE — CARDIO/PULMONARY
[facility]    OPCR chart review    75 yo male from [place] presented 2/24/23 with chest tightness/NSTEMI. PMH includes HTN, HLD, hiatal hernia. Echo 2/25/23 revealed EF 55-60% with moderate aortic stenosis. Cardiac cath 2/27/23 revealed multivessel disease with recommendation for CTS consult. Currently, pt is not a candidate for OPCR as treatment for CAD/AS is pending.

## 2023-02-27 NOTE — PROGRESS NOTES
[facility]                    Cardiology Care Note     []Initial Encounter     [x]Follow-up    Patient Name: Williams Melissa - KWU:[birth year] - [facility]:[id number]  Primary Cardiologist: none   Consulting Cardiologist: [facility] Cardiology Physicians: Jennifer Pink MD     Reason for encounter: CP    HPI:       Williams Melissa is a 76 y.o. male with PMH significant for HTN and HLD    Pt reports earlier today he felt a tightness in his chest and discomfort. Nothing seemed to aggravate or relieve the pain. Became concerned so came to the ED. He denies any palpitations, SOB, edema. No prior cardiac hx. Reports his father had \"heart problems\" but did not have a heart attack. Trop mildly elevated since admission, chest xray with hiatal hernia which pt was unaware of. Subjective:      Williams Melissa reports no further CP, SOB, palpitations. Assessment and Plan     NSTEMI  Presented with chest tightness. EKG non-ischemic. However, his tropnins was significantly elevated this AM and continues to trend upward (12-->132-->360-->3,777-->2628). - Cont Heparin gtt per ACS protolol  - cont ASA 81 mg daily  - increased Crestor to 20 mg daily  - cont Losartan, keep BP<130/90  - TTE demonstrated EF 55-60% with evidence of moderate AS (mean 31 mmHg), mild AR  - implications, benefits, and risks associated with LHC was explained to patient  - C today    2. CP:   could be partially caused by mod large hiatal hernia. Ruled in with Troponins.  - management as noted above    3. Aortic stenosis  - see above    4. Hiatal hernia: mod large seen on chest xray    5. HTN: on micardis PTA     6. HLD: on crestor , Zetia          Pt personally seen and examined. Chart reviewed. Agree with advanced NP's history, exam and  A/P with changes/additons. Blood pressure (!) 104/42, pulse (!) 56, temperature 97.8 °F (36.6 °C), resp.  rate 15, height 5' 11\" (1.803 m), weight 229 lb 15 oz (104.3 kg), SpO2 93 %. Alert/Not in acute distress  Neck - no JVD  CVS - S1 S2 +; Reg; 2/6 sys murmur+  RS : CTAB  Abd: Soft/BS+  LE - no edema    A/P :    NSTEMI - 2 V Dz on [facility]  Aortic Stenosis - mod to severe by echo  HTN  HLD    Transfer to [facility] for CABG/AVR - d/w Dr Gaye Nina      Discussed with patient/nursing/family ( wife)    Arlene Akins MD, [facility]      ____________________________________________________________    Cardiac testing    02/24/23    ECHO ADULT COMPLETE 02/25/2023 2/25/2023    Interpretation Summary    Left Ventricle: Normal left ventricular systolic function with a visually estimated EF of 55 - 60%. Left ventricle size is normal. Normal wall thickness. Normal wall motion. Grade I diastolic dysfunction with normal LAP. Aortic Valve: Valve structure is normal. Moderately calcified cusp. Moderate sclerosis of the aortic valve cusp. Mild regurgitation. Moderate stenosis of the aortic valve. AV mean gradient is 31 mmHg. AV peak velocity is 3.6 m/s. SALLY ( planimetry) 1.1 sq cm    Signed by: Anat Rain MD on 2/25/2023  1:42 PM      Most recent HS troponins:  Recent Labs     02/25/23  1151 02/25/23  0228 02/24/23  1705   TROPHS 2,628* 3,777* 360*       ECG: normal sinus rhythm    Review of Systems:    [x]All other systems reviewed and all negative except as written in HPI    [] Patient unable to provide secondary to condition    Past Medical History:   Diagnosis Date    Hypercholesterolemia     Hypertension      No past surgical history on file. Social Hx:  reports that he has quit smoking. His smoking use included cigarettes. He has never used smokeless tobacco. He reports that he does not currently use alcohol. Family Hx: family history includes Heart Disease in his father.   No Known Allergies       OBJECTIVE:  Wt Readings from Last 3 Encounters:   02/25/23 104.3 kg (229 lb 15 oz)       Intake/Output Summary (Last 24 hours) at 2/27/2023 [address] filed at 2/27/2023 0515  Gross per 24 hour   Intake 200 ml   Output 1200 ml   Net -1000 ml       Physical Exam:    Vitals:   Vitals:    02/27/23 0010 02/27/23 0459 02/27/23 0700 02/27/23 0746   BP: 112/63 116/61  135/62   Pulse: 65 74 66 65   Resp: 16 16  20   Temp: 97.5 °F (36.4 °C) 98.4 °F (36.9 °C)  97.8 °F (36.6 °C)   SpO2: 96% 94%  95%   Weight:       Height:         Telemetry: normal sinus rhythm    Gen: Well-developed, well-nourished, in no acute distress  Neck: Supple, No JVD, No Carotid Bruit  Resp: No accessory muscle use, Clear breath sounds, No rales or rhonchi  Card: Regular Rate,Rythm, Normal S1, S2, 2/6 murmurs, no rubs or gallop. No thrills. Abd:   Soft, non-tender, non-distended, BS+   MSK: No cyanosis  Skin: No rashes    Neuro: Moving all four extremities, follows commands appropriately  Psych: Good insight, oriented to person, place, alert, Nml Affect  LE: No edema    Data Review:     Radiology:   XR Results (most recent):  Results from Hospital Encounter encounter on 02/24/23    XR CHEST PA LAT    Narrative  INDICATION:  chest pain. EXAM: 2 VIEW CHEST RADIOGRAPH. COMPARISON: None. FINDINGS: Frontal and lateral views of the chest show a moderately large hiatal  hernia. No focal infiltrate or effusion. No CHF pattern. . . Minimal bibasilar  atelectasis. The heart, mediastinum and pulmonary vasculature are upper normal.  The bony thorax is unremarkable for age. ..    Impression  Minimal bibasilar atelectasis. Moderately large hiatal hernia. .      Recent Labs     02/25/23  0228 02/24/23  1224    138   K 4.0 3.9    106   CO2 28 29   BUN 13 17   CREA 0.79 1.04   GLU 97 103*   PHOS 3.4  --    CA 8.5 9.2     Recent Labs     02/27/23  0510 02/26/23  0006   WBC 7.1 6.6   HGB 12.4 11.9*   HCT 37.7 36.8   * 139*     Recent Labs     02/25/23 0228 02/24/23  1224   PTP  --  10.6   INR  --  1.0   AP 48 59     Recent Labs     02/25/23 0228   CHOL 110   LDLC 44.2 Current meds:    Current Facility-Administered Medications:     rosuvastatin (CRESTOR) tablet 20 mg, 20 mg, Oral, QHS, Keri Melgar MD, 20 mg at 02/26/23 2051    nitroglycerin (NITROSTAT) tablet 0.4 mg, 0.4 mg, SubLINGual, Q5MIN PRN, Jame Irving MD, 0.4 mg at 02/24/23 1240    morphine injection 2 mg, 2 mg, IntraVENous, Q4H PRN, Jame Irving MD    aspirin chewable tablet 81 mg, 81 mg, Oral, DAILY, Jame Irving MD, 81 mg at 02/26/23 0900    heparin 25,000 units in D5W 250 ml infusion, 9-25 Units/kg/hr, IntraVENous, TITRATE, Jame Irving MD, Last Rate: 9.4 mL/hr at 02/27/23 0719, 9 Units/kg/hr at 02/27/23 0719    labetaloL (NORMODYNE;TRANDATE) 20 mg/4 mL (5 mg/mL) injection 20 mg, 20 mg, IntraVENous, Q4H PRN, Jame Irving MD    ezetimibe (ZETIA) tablet 10 mg, 10 mg, Oral, DAILY, DoLinn MD, 10 mg at 02/26/23 0900    losartan (COZAAR) tablet 50 mg, 50 mg, Oral, QHS, Linn Irving MD, 50 mg at 02/26/23 2049    0.9% sodium chloride infusion, 50 mL/hr, IntraVENous, CONTINUOUS, Linn Irving MD, Last Rate: 50 mL/hr at 02/24/23 2040, 50 mL/hr at 02/24/23 2040    sodium chloride (NS) flush 5-40 mL, 5-40 mL, IntraVENous, Q8H, Linn Irving MD, 10 mL at 02/27/23 0522    sodium chloride (NS) flush 5-40 mL, 5-40 mL, IntraVENous, PRN, Linn Irving MD    0.9% sodium chloride infusion 25 mL, 25 mL, IntraVENous, PRN, Linn Irving MD    acetaminophen (TYLENOL) tablet 650 mg, 650 mg, Oral, Q6H PRN **OR** acetaminophen (TYLENOL) suppository 650 mg, 650 mg, Rectal, Q6H PRN, DoJame MD    bisacodyL (DULCOLAX) suppository 10 mg, 10 mg, Rectal, DAILY PRN, Jame Irving MD    promethazine (PHENERGAN) tablet 12.5 mg, 12.5 mg, Oral, Q6H PRN **OR** ondansetron (ZOFRAN) injection 4 mg, 4 mg, IntraVENous, Q6H PRN, Do, MD Julisa Willett, WAQAR    [facility] Cardiology  Call center: [facility] [phone]  (f) [phone]      Shaquille Breen MD

## 2023-02-27 NOTE — PROGRESS NOTES
Steve Andrade [facility]  [address] [facility], [address] [place] [address]  [phone]      Medical Progress Note      NAME: Reggie Guan   :  1945  MRM:  [id number]    Date/Time: 2023  2:05 PM           Assessment / Plan:      75 yo hx of HTN, hyperlipidemia, presented w/ chest pain, NSTEMI:     NSTEMI  [facility] with MVCAD   Will transfer to [facility] for CABG and AVR  Will be continued on heparin drip with close monitoring of APTT and hemoglobin. Cardiac meds being optimized  Increased Crestor and losartan. Continue with aspirin. Not currently on a beta-blocker which I will defer to cardiology. Repeat troponin and EKG with any further chest pain or chest tightness. Moderate aortic stenosis  Management per cardiology. - transfer to [facility]     Essential hypertension  Monitor and titrate medications accordingly. Hyperlipidemia  Statin therapy with increased Crestor as above. Total time spent on critical care 31 minutes. Care Plan discussed with: Patient. Prophylaxis: Heparin drip. Disposition:  Home w/Family           ___________________________________________________    Attending Physician: Deisy Romero MD        Subjective:     Chief Complaint: Lying comfortably in bed. No current chest tightness or chest pain. No current nausea or vomiting or diaphoresis. Patient disappointed he needs to stay another night for cath to be done on Monday. ROS:  Rest of review of systems negative otherwise. Objective:       Vitals:     Last 24hrs VS reviewed since prior progress note. Most recent are:    Visit Vitals  /60 (BP 1 Location: Left upper arm, BP Patient Position: Semi fowlers; Lying)   Pulse (!) 58   Temp 97.5 °F (36.4 °C)   Resp 20   Ht 5' 11\" (1.803 m)   Wt 104.3 kg (229 lb 15 oz)   SpO2 98%   BMI 32.07 kg/m²     SpO2 Readings from Last 6 Encounters:   23 98%    O2 Flow Rate (L/min): 2 l/min     Intake/Output Summary (Last 24 hours) at 2/27/2023 1423  Last data filed at 2/27/2023 0515  Gross per 24 hour   Intake --   Output 1200 ml   Net -1200 ml            Exam:     Physical Exam:    Gen:  No acute distress. HEENT:  NC/AT. Pink conjunctivae, hearing intact to voice, moist mucous membranes. Neck:  Supple. Resp:  Unlabored breathing. Clear breath sounds with good air entry. No wheezes rales or rhonchi. Card:  Regular rate and rhythm. Normal S1 and S2. No murmurs. Abd:  Soft, non-tender, non-distended, normoactive bowel sounds. Ext: No clubbing, cyanosis or edema. Skin:  No rashes or ulcers, skin turgor is good. Neuro:  Moving all extremities with no gross focal deficits appreciated. Psych:  Good insight, oriented to person, place and time, alert.     Medications Reviewed: (see below)    Lab Data Reviewed: (see below)  ______________________________________________________________________    Medications:     Current Facility-Administered Medications   Medication Dose Route Frequency    sodium chloride (NS) flush 5-40 mL  5-40 mL IntraVENous Q8H    sodium chloride (NS) flush 5-40 mL  5-40 mL IntraVENous PRN    0.9% sodium chloride infusion  75 mL/hr IntraVENous CONTINUOUS    mupirocin (BACTROBAN) 2 % ointment   Both Nostrils Q12H    chlorhexidine (PERIDEX) 0.12 % mouthwash 15 mL  15 mL Oral Q12H    rosuvastatin (CRESTOR) tablet 20 mg  20 mg Oral QHS    nitroglycerin (NITROSTAT) tablet 0.4 mg  0.4 mg SubLINGual Q5MIN PRN    morphine injection 2 mg  2 mg IntraVENous Q4H PRN    aspirin chewable tablet 81 mg  81 mg Oral DAILY    labetaloL (NORMODYNE;TRANDATE) 20 mg/4 mL (5 mg/mL) injection 20 mg  20 mg IntraVENous Q4H PRN    ezetimibe (ZETIA) tablet 10 mg  10 mg Oral DAILY    losartan (COZAAR) tablet 50 mg  50 mg Oral QHS    0.9% sodium chloride infusion  50 mL/hr IntraVENous CONTINUOUS    sodium chloride (NS) flush 5-40 mL  5-40 mL IntraVENous Q8H    sodium chloride (NS) flush 5-40 mL  5-40 mL IntraVENous PRN    0.9% sodium chloride infusion 25 mL  25 mL IntraVENous PRN    acetaminophen (TYLENOL) tablet 650 mg  650 mg Oral Q6H PRN    Or    acetaminophen (TYLENOL) suppository 650 mg  650 mg Rectal Q6H PRN    bisacodyL (DULCOLAX) suppository 10 mg  10 mg Rectal DAILY PRN    promethazine (PHENERGAN) tablet 12.5 mg  12.5 mg Oral Q6H PRN    Or    ondansetron (ZOFRAN) injection 4 mg  4 mg IntraVENous Q6H PRN        Lab Review:     Recent Labs     02/27/23  0510 02/26/23  0006 02/25/23 0228   WBC 7.1 6.6 6.0   HGB 12.4 11.9* 12.1   HCT 37.7 36.8 36.9   * 139* 146*       Recent Labs     02/25/23 0228      K 4.0      CO2 28   GLU 97   BUN 13   CREA 0.79   CA 8.5   MG 1.9   PHOS 3.4   ALB 3.4*   ALT 31       No components found for: [place]

## 2023-02-28 ENCOUNTER — HOSPITAL ENCOUNTER (INPATIENT)
Age: 78
LOS: 9 days | Discharge: HOME HEALTH | DRG: 219 | End: 2023-03-09
Attending: HOSPITALIST | Admitting: THORACIC SURGERY (CARDIOTHORACIC VASCULAR SURGERY)
Payer: MEDICARE

## 2023-02-28 ENCOUNTER — TRANSCRIBE ORDER (OUTPATIENT)
Dept: CARDIAC REHAB | Age: 78
End: 2023-02-28

## 2023-02-28 VITALS
SYSTOLIC BLOOD PRESSURE: 137 MMHG | OXYGEN SATURATION: 95 % | HEIGHT: 71 IN | DIASTOLIC BLOOD PRESSURE: 62 MMHG | RESPIRATION RATE: 17 BRPM | TEMPERATURE: 98 F | BODY MASS INDEX: 32.19 KG/M2 | WEIGHT: 229.94 LBS | HEART RATE: 60 BPM

## 2023-02-28 DIAGNOSIS — I21.4 ACUTE MYOCARDIAL INFARCTION, SUBENDOCARDIAL INFARCTION, INITIAL EPISODE OF CARE (HCC): Primary | ICD-10-CM

## 2023-02-28 DIAGNOSIS — Z95.2 S/P AVR: Primary | ICD-10-CM

## 2023-02-28 LAB
ALBUMIN SERPL-MCNC: 3.3 G/DL (ref 3.5–5)
ALBUMIN/GLOB SERPL: 1.1 (ref 1.1–2.2)
ALP SERPL-CCNC: 59 U/L (ref 45–117)
ALT SERPL-CCNC: 30 U/L (ref 12–78)
ANION GAP SERPL CALC-SCNC: 4 MMOL/L (ref 5–15)
APTT PPP: 27 SEC (ref 22.1–31)
AST SERPL-CCNC: 31 U/L (ref 15–37)
BASOPHILS # BLD: 0 K/UL (ref 0–0.1)
BASOPHILS NFR BLD: 0 % (ref 0–1)
BILIRUB SERPL-MCNC: 0.6 MG/DL (ref 0.2–1)
BUN SERPL-MCNC: 12 MG/DL (ref 6–20)
BUN/CREAT SERPL: 14 (ref 12–20)
CALCIUM SERPL-MCNC: 9 MG/DL (ref 8.5–10.1)
CHLORIDE SERPL-SCNC: 108 MMOL/L (ref 97–108)
CO2 SERPL-SCNC: 26 MMOL/L (ref 21–32)
CREAT SERPL-MCNC: 0.84 MG/DL (ref 0.7–1.3)
DIFFERENTIAL METHOD BLD: ABNORMAL
EOSINOPHIL # BLD: 0.1 K/UL (ref 0–0.4)
EOSINOPHIL NFR BLD: 1 % (ref 0–7)
ERYTHROCYTE [DISTWIDTH] IN BLOOD BY AUTOMATED COUNT: 12.5 % (ref 11.5–14.5)
GLOBULIN SER CALC-MCNC: 3.1 G/DL (ref 2–4)
GLUCOSE SERPL-MCNC: 100 MG/DL (ref 65–100)
HCT VFR BLD AUTO: 35.3 % (ref 36.6–50.3)
HGB BLD-MCNC: 11.7 G/DL (ref 12.1–17)
HISTORY CHECKED?,CKHIST: NORMAL
IMM GRANULOCYTES # BLD AUTO: 0 K/UL (ref 0–0.04)
IMM GRANULOCYTES NFR BLD AUTO: 0 % (ref 0–0.5)
LEFT ABI: 1.24
LEFT ARM BP: 117 MMHG
LEFT POSTERIOR TIBIAL: 145 MMHG
LEFT TBI: 1.01
LEFT TOE PRESSURE: 118 MMHG
LYMPHOCYTES # BLD: 1.1 K/UL (ref 0.8–3.5)
LYMPHOCYTES NFR BLD: 18 % (ref 12–49)
MCH RBC QN AUTO: 29.5 PG (ref 26–34)
MCHC RBC AUTO-ENTMCNC: 33.1 G/DL (ref 30–36.5)
MCV RBC AUTO: 89.1 FL (ref 80–99)
MONOCYTES # BLD: 0.7 K/UL (ref 0–1)
MONOCYTES NFR BLD: 11 % (ref 5–13)
NEUTS SEG # BLD: 4.4 K/UL (ref 1.8–8)
NEUTS SEG NFR BLD: 70 % (ref 32–75)
NRBC # BLD: 0 K/UL (ref 0–0.01)
NRBC BLD-RTO: 0 PER 100 WBC
PLATELET # BLD AUTO: 132 K/UL (ref 150–400)
PMV BLD AUTO: 10.6 FL (ref 8.9–12.9)
POTASSIUM SERPL-SCNC: 4 MMOL/L (ref 3.5–5.1)
PROT SERPL-MCNC: 6.4 G/DL (ref 6.4–8.2)
RBC # BLD AUTO: 3.96 M/UL (ref 4.1–5.7)
RIGHT ABI: 1.38
RIGHT POSTERIOR TIBIAL: 162 MMHG
RIGHT TBI: 1.04
RIGHT TOE PRESSURE: 122 MMHG
SODIUM SERPL-SCNC: 138 MMOL/L (ref 136–145)
THERAPEUTIC RANGE,PTTT: NORMAL SECS (ref 58–77)
UFH PPP CHRO-ACNC: 0.19 IU/ML
UFH PPP CHRO-ACNC: <0.1 IU/ML
VAS LEFT DORSALIS PEDIS BP: 145 MMHG
VAS RIGHT DORSALIS PEDIS BP: 145 MMHG
WBC # BLD AUTO: 6.4 K/UL (ref 4.1–11.1)

## 2023-02-28 PROCEDURE — 74011250637 HC RX REV CODE- 250/637: Performed by: NURSE PRACTITIONER

## 2023-02-28 PROCEDURE — 74011250636 HC RX REV CODE- 250/636: Performed by: HOSPITALIST

## 2023-02-28 PROCEDURE — 85730 THROMBOPLASTIN TIME PARTIAL: CPT

## 2023-02-28 PROCEDURE — 85520 HEPARIN ASSAY: CPT

## 2023-02-28 PROCEDURE — 74011250637 HC RX REV CODE- 250/637: Performed by: INTERNAL MEDICINE

## 2023-02-28 PROCEDURE — 74011250636 HC RX REV CODE- 250/636: Performed by: INTERNAL MEDICINE

## 2023-02-28 PROCEDURE — 74011000250 HC RX REV CODE- 250: Performed by: INTERNAL MEDICINE

## 2023-02-28 PROCEDURE — 85025 COMPLETE CBC W/AUTO DIFF WBC: CPT

## 2023-02-28 PROCEDURE — 36415 COLL VENOUS BLD VENIPUNCTURE: CPT

## 2023-02-28 PROCEDURE — 80053 COMPREHEN METABOLIC PANEL: CPT

## 2023-02-28 PROCEDURE — 65660000001 HC RM ICU INTERMED STEPDOWN

## 2023-02-28 RX ORDER — SODIUM CHLORIDE 0.9 % (FLUSH) 0.9 %
5-40 SYRINGE (ML) INJECTION EVERY 8 HOURS
Status: DISCONTINUED | OUTPATIENT
Start: 2023-02-28 | End: 2023-03-03

## 2023-02-28 RX ORDER — LABETALOL HYDROCHLORIDE 5 MG/ML
20 INJECTION, SOLUTION INTRAVENOUS
Status: DISCONTINUED | OUTPATIENT
Start: 2023-02-28 | End: 2023-03-03

## 2023-02-28 RX ORDER — CARVEDILOL 6.25 MG/1
6.25 TABLET ORAL 2 TIMES DAILY WITH MEALS
Status: DISCONTINUED | OUTPATIENT
Start: 2023-02-28 | End: 2023-03-03

## 2023-02-28 RX ORDER — ROSUVASTATIN CALCIUM 10 MG/1
20 TABLET, COATED ORAL
Status: DISCONTINUED | OUTPATIENT
Start: 2023-02-28 | End: 2023-03-03

## 2023-02-28 RX ORDER — ACETAMINOPHEN 325 MG/1
650 TABLET ORAL
Status: DISCONTINUED | OUTPATIENT
Start: 2023-02-28 | End: 2023-03-03

## 2023-02-28 RX ORDER — NITROGLYCERIN 0.4 MG/1
0.4 TABLET SUBLINGUAL
Status: DISCONTINUED | OUTPATIENT
Start: 2023-02-28 | End: 2023-03-03

## 2023-02-28 RX ORDER — SODIUM CHLORIDE 0.9 % (FLUSH) 0.9 %
5-40 SYRINGE (ML) INJECTION AS NEEDED
Status: DISCONTINUED | OUTPATIENT
Start: 2023-02-28 | End: 2023-03-03

## 2023-02-28 RX ORDER — GUAIFENESIN 100 MG/5ML
81 LIQUID (ML) ORAL DAILY
Status: DISCONTINUED | OUTPATIENT
Start: 2023-02-28 | End: 2023-03-03

## 2023-02-28 RX ORDER — SODIUM CHLORIDE 9 MG/ML
25 INJECTION, SOLUTION INTRAVENOUS AS NEEDED
Status: DISCONTINUED | OUTPATIENT
Start: 2023-02-28 | End: 2023-03-03

## 2023-02-28 RX ORDER — EZETIMIBE 10 MG/1
10 TABLET ORAL DAILY
Status: DISCONTINUED | OUTPATIENT
Start: 2023-02-28 | End: 2023-03-03

## 2023-02-28 RX ORDER — LOSARTAN POTASSIUM 50 MG/1
50 TABLET ORAL
Status: COMPLETED | OUTPATIENT
Start: 2023-02-28 | End: 2023-02-28

## 2023-02-28 RX ORDER — ACETAMINOPHEN 650 MG/1
650 SUPPOSITORY RECTAL
Status: DISCONTINUED | OUTPATIENT
Start: 2023-02-28 | End: 2023-03-03

## 2023-02-28 RX ORDER — HEPARIN SODIUM 10000 [USP'U]/100ML
9-25 INJECTION, SOLUTION INTRAVENOUS
Status: DISPENSED | OUTPATIENT
Start: 2023-02-28 | End: 2023-03-02

## 2023-02-28 RX ORDER — SODIUM CHLORIDE 9 MG/ML
50 INJECTION, SOLUTION INTRAVENOUS CONTINUOUS
Status: DISCONTINUED | OUTPATIENT
Start: 2023-02-28 | End: 2023-03-03

## 2023-02-28 RX ORDER — PROMETHAZINE HYDROCHLORIDE 25 MG/1
12.5 TABLET ORAL
Status: DISCONTINUED | OUTPATIENT
Start: 2023-02-28 | End: 2023-03-03

## 2023-02-28 RX ORDER — MORPHINE SULFATE 2 MG/ML
2 INJECTION, SOLUTION INTRAMUSCULAR; INTRAVENOUS
Status: DISCONTINUED | OUTPATIENT
Start: 2023-02-28 | End: 2023-03-03

## 2023-02-28 RX ORDER — ONDANSETRON 2 MG/ML
4 INJECTION INTRAMUSCULAR; INTRAVENOUS
Status: DISCONTINUED | OUTPATIENT
Start: 2023-02-28 | End: 2023-03-03

## 2023-02-28 RX ORDER — MUPIROCIN 20 MG/G
OINTMENT TOPICAL EVERY 12 HOURS
Status: DISCONTINUED | OUTPATIENT
Start: 2023-02-28 | End: 2023-03-03

## 2023-02-28 RX ORDER — CHLORHEXIDINE GLUCONATE 1.2 MG/ML
15 RINSE ORAL EVERY 12 HOURS
Status: DISCONTINUED | OUTPATIENT
Start: 2023-02-28 | End: 2023-03-03

## 2023-02-28 RX ORDER — FACIAL-BODY WIPES
10 EACH TOPICAL DAILY PRN
Status: DISCONTINUED | OUTPATIENT
Start: 2023-02-28 | End: 2023-03-03

## 2023-02-28 RX ORDER — HEPARIN SODIUM 1000 [USP'U]/ML
2000 INJECTION, SOLUTION INTRAVENOUS; SUBCUTANEOUS ONCE
Status: COMPLETED | OUTPATIENT
Start: 2023-02-28 | End: 2023-02-28

## 2023-02-28 RX ADMIN — SODIUM CHLORIDE, PRESERVATIVE FREE 10 ML: 5 INJECTION INTRAVENOUS at 08:10

## 2023-02-28 RX ADMIN — CARVEDILOL 6.25 MG: 6.25 TABLET, FILM COATED ORAL at 17:04

## 2023-02-28 RX ADMIN — CARVEDILOL 6.25 MG: 6.25 TABLET, FILM COATED ORAL at 08:20

## 2023-02-28 RX ADMIN — MUPIROCIN: 20 OINTMENT TOPICAL at 08:20

## 2023-02-28 RX ADMIN — MUPIROCIN: 20 OINTMENT TOPICAL at 21:15

## 2023-02-28 RX ADMIN — ASPIRIN 81 MG CHEWABLE TABLET 81 MG: 81 TABLET CHEWABLE at 08:20

## 2023-02-28 RX ADMIN — ROSUVASTATIN 20 MG: 10 TABLET, FILM COATED ORAL at 21:14

## 2023-02-28 RX ADMIN — CHLORHEXIDINE GLUCONATE 15 ML: 1.2 RINSE ORAL at 21:15

## 2023-02-28 RX ADMIN — LOSARTAN POTASSIUM 50 MG: 50 TABLET, FILM COATED ORAL at 21:14

## 2023-02-28 RX ADMIN — EZETIMIBE 10 MG: 10 TABLET ORAL at 08:20

## 2023-02-28 RX ADMIN — SODIUM CHLORIDE 50 ML/HR: 9 INJECTION, SOLUTION INTRAVENOUS at 08:08

## 2023-02-28 RX ADMIN — HEPARIN SODIUM 9 UNITS/KG/HR: 10000 INJECTION, SOLUTION INTRAVENOUS at 08:08

## 2023-02-28 RX ADMIN — CHLORHEXIDINE GLUCONATE 15 ML: 1.2 RINSE ORAL at 08:25

## 2023-02-28 RX ADMIN — SODIUM CHLORIDE, PRESERVATIVE FREE 10 ML: 5 INJECTION INTRAVENOUS at 21:14

## 2023-02-28 RX ADMIN — SODIUM CHLORIDE, PRESERVATIVE FREE 10 ML: 5 INJECTION INTRAVENOUS at 14:00

## 2023-02-28 RX ADMIN — HEPARIN SODIUM 2000 UNITS: 1000 INJECTION INTRAVENOUS; SUBCUTANEOUS at 17:57

## 2023-02-28 NOTE — PROGRESS NOTES
Cardiac Surgery Care Coordinator-  Met with Magrarita Conner, Introduced role of the Cardiac Surgery Care Coordinator. Reviewed plan of care and began pre-op education. Discussed day of surgery expectations for the pt and family. Instructed pt on the proper use of the incentive spirometer, he is able to pull 2000cc with good effort. Reviewed material in the CABG/VALVE educational folder including Cardiac Surgery Pathway. Reinforced sternal precautions and keeping your move in the tube. Encouraged Margarita Conner to verbalize and offered emotional support.

## 2023-02-28 NOTE — PROGRESS NOTES
Problem: Falls - Risk of  Goal: *Absence of Falls  Description: Document Fercho Claros Fall Risk and appropriate interventions in the flowsheet.   Outcome: Resolved/Met     Problem: Patient Education: Go to Patient Education Activity  Goal: Patient/Family Education  Outcome: Resolved/Met

## 2023-02-28 NOTE — PROGRESS NOTES
1900 / Monday February 27  Bedside and Verbal shift change report given to David Ortega RN (oncoming nurse) by Ld Davis RN (offgoing nurse). Report included the following information SBAR, Kardex, Procedure Summary, Intake/Output, MAR, Recent Results, and Med Rec Status. 0100 / Tuesday February 28  Pt refused labs, citing that he is 'leaving' in reference to his transfer to [facility]. 0335  [facility] on floor to pick Pt up. DANE complete, signed. 0350  Pt taken by [facility].

## 2023-02-28 NOTE — PROGRESS NOTES
1900 / Monday February 27  Bedside and Verbal shift change report given to Burlene Sicard, RN (oncoming nurse) by Alvin Young RN (offgoing nurse). Report included the following information SBAR, Kardex, Procedure Summary, Intake/Output, MAR, Recent Results, and Med Rec Status. 0100 / Tuesday February 28  Pt refused labs, citing that he is 'leaving' in reference to his transfer to [facility]. 0335  [facility] on floor to pick Pt up. DANE complete, signed. 0350  Pt taken by [facility].

## 2023-02-28 NOTE — CONSULTS
[facility]   History and Physical    Subjective:      Samir Gupta is a 76 y.o. male who was referred for cardiac evaluation by Dr. Alexy Lee. He has a PMH of HTN and dyslipidemia. He presented to [facility] on  with complaints of chest tightness and was found positive NSTEMI. He underwent cardiac catherization which showed multi vessel disease and moderate aortic stenosis. He was transferred to [facility] and cardiac surgery was consulted for surgical evaluation. Mr. Tadeo Londono denies any symptoms prior to the episode of chest tightness. He denies SOB, palpitations, dizziness, edema, orthopnea. Mr. Tadeo Londono lives with his wife in the independent living quarters at [facility]. He is independent of his ADLs. 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 He is COVID and flu vaccinated. He has a remote smoking history in his 25s but quit over 50 years ago, does smoke 20-30 cigars/year, denies any history of drinking or recreational drug use. He has a family history of a father who had CAD and what sounds like CHF, brother with cardiac stents, and sister who  of a stroke. Cardiac Testing    Cardiac catheterization on 23:  Conclusion    Access: R Radial Access - 6 F sheath        Difficulty advancing guide wire radial artery - angiogram; Small vessel/tortuos/lesion ( forearm) vs vasospasm     All catheter exchanges performed over guide wire     Difficult advance  6 F EBU guide for iFR ; Able to advance 5 F carlos 3.5 guide     Catheters: RCA : JR4                    LCA : JL3.5     Findings:      L Main:Long/ Med to Large; MLI;      LAD: Med; Prox diffuse 60-70%; Mid - diffuse 70%      RI - Med; MLI     LCflex: Med; Mid 50%;  Very small OM1; OM2 - Med to large; ostial/proximal 50%     RCA: Prox/ Mid - Med to Large ; Distal - Med; 70%;  PDA  and PLB - small to med; MLI     LVEDP: Valve not crossed     LVEF: Not assessed     No significant gradient across aortic valve. PCI: JL3.5 [place] guide  iFR  LAD - significant - 0.77        Specimens Removed : None     Devices implanted : None     Complications: None     Closure Device: R Radial - TR band     Coronary Findings    Diagnostic  Dominance: Right  Left Main   The vessel exhibits minimal luminal irregularities. Left Anterior Descending   Prox LAD lesion, 70% stenosed. Mid LAD lesion, 70% stenosed. Ramus Intermedius   The vessel exhibits minimal luminal irregularities. Left Circumflex   Mid Cx lesion, 50% stenosed. Second Obtuse Marginal Branch   2nd Mrg lesion, 50% stenosed. Right Coronary Artery   Dist RCA lesion, 70% stenosed. Intervention    No interventions have been documented. ECHO on 2/25/23:  Interpretation Summary         Left Ventricle: Normal left ventricular systolic function with a visually estimated EF of 55 - 60%. Left ventricle size is normal. Normal wall thickness. Normal wall motion. Grade I diastolic dysfunction with normal LAP. Aortic Valve: Valve structure is normal. Moderately calcified cusp. Moderate sclerosis of the aortic valve cusp. Mild regurgitation. Moderate stenosis of the aortic valve. AV mean gradient is 31 mmHg. AV peak velocity is 3.6 m/s. SALLY ( planimetry) 1.1 sq cm     Comparison Study Information    Prior Study    There is no prior study available for comparison. Echo Findings    Left Ventricle Normal left ventricular systolic function with a visually estimated EF of 55 - 60%. Left ventricle size is normal. Normal wall thickness. Normal wall motion. Grade I diastolic dysfunction with normal LAP. Left Atrium Left atrium size is normal.   Interatrial Septum No interatrial shunt visualized with color Doppler. Right Ventricle Right ventricle size is normal. Normal wall thickness. Normal systolic function. Right Atrium Right atrium size is normal.   Aortic Valve Valve structure is normal. Moderately calcified cusp.  Moderate sclerosis of the aortic valve cusp. Mild regurgitation. Moderate stenosis of the aortic valve. AV mean gradient is 31 mmHg. AV peak velocity is 3.6 m/s. SALLY ( planimetry) 1.1 sq cm   Mitral Valve Valve structure is normal. No regurgitation. No stenosis noted. Tricuspid Valve Valve structure is normal. Mild regurgitation. No stenosis noted. Pulmonic Valve The pulmonic valve was not well visualized. No regurgitation. No stenosis noted. Aorta Normal sized aortic root. IVC/Hepatic Veins IVC diameter is less than or equal to 21 mm and decreases greater than 50% during inspiration; therefore the estimated right atrial pressure is normal (~3 mmHg). IVC size is normal.   Pericardium No pericardial effusion. Past Medical History:   Diagnosis Date    Hypercholesterolemia     Hypertension      No past surgical history on file. Social History     Tobacco Use    Smoking status: Former     Types: Cigarettes    Smokeless tobacco: Never   Substance Use Topics    Alcohol use: Not Currently      Family History   Problem Relation Age of Onset    Heart Disease Father      Prior to Admission medications    Medication Sig Start Date End Date Taking? Authorizing Provider   rosuvastatin (CRESTOR) 10 mg tablet Take 10 mg by mouth nightly. Jana Baer MD   ezetimibe (ZETIA) 10 mg tablet Take 10 mg by mouth. Jana Baer MD   telmisartan (MICARDIS) 20 mg tablet Take 20 mg by mouth daily. Jana Baer MD       No Known Allergies    Review of Systems: Pertinent positives per HPI  Consititutional: Denies fever or chills. Eyes:  Denies use of glasses or vision problems(cataracts). ENT:  Denies hearing or swallowing difficulty. CV: Denies CP, claudication, HTN. Resp: Denies dyspnea, productive cough. : Denies dialysis or kidney problems. GI: Denies ulcers, esophageal strictures, liver problems. M/S: Denies joint or bone problems, or implanted artificial hardware. Skin: Denies varicose veins, edema.    Neuro: Denies strokes, or TIAs.  Psych: Denies anxiety or depression. Endocrine: Denies thyroid problems or diabetes. Heme/Lymphatic: Denies easy bruising or lymphedema. Objective:     VS: BP (!) 117/46 (BP 1 Location: Left upper arm, BP Patient Position: At rest)   Pulse 68   Temp 98.5 °F (36.9 °C)   Resp 17   Wt 237 lb 14 oz (107.9 kg)   SpO2 96%   BMI 33.18 kg/m²       Physical Exam:    General appearance: alert, cooperative, no distress  Head: normocephalic, without obvious abnormality; atraumatic  Eyes: conjunctivae/corneas clear; EOM's intact. Nose: nares normal; no drainage. Lungs: clear to auscultation bilaterally  Heart: regular rate and rhythm; +III/VI murmur  Abdomen: soft, non-tender; bowel sounds normal  Extremities: moves all extremities; no weakness. Skin: Skin color normal; No varicose veins or edema. Neurologic: Grossly normal      Labs:   Recent Labs     02/28/23  0644   WBC 6.4   HGB 11.7*   HCT 35.3*   *      K 4.0   BUN 12   CREA 0.84          Diagnostics:   PA and lateral on 2/24/23:    INDICATION:  chest pain. EXAM: 2 VIEW CHEST RADIOGRAPH. COMPARISON: None. FINDINGS: Frontal and lateral views of the chest show a moderately large hiatal  hernia. No focal infiltrate or effusion. No CHF pattern. . . Minimal bibasilar  atelectasis. The heart, mediastinum and pulmonary vasculature are upper normal.   The bony thorax is unremarkable for age. .. IMPRESSION   Minimal bibasilar atelectasis. Moderately large hiatal hernia. .     Carotid doppler on 2/27/23: Preliminary Result  Interpretation Summary    Findings are consistent with 0-49% stenosis of the right internal carotid and  high end 0-49% stenosis of the left internal carotid. Unable to obtain a signal for the right vertebral suggesting occlusion. Left vertebrals is patent with antegrade flow. Cerebrovascular Findings    Right Carotid    The right CCA is patent. There is intimal thickening present in the right CCA. Carotid bulb has heterogeneous plaque. The right ICA is normal. The right ECA is patent. The right vertebral is antegrade. There is no stenosis in the right vertebral artery. The right subclavian is normal.     Left Carotid    The left CCA is patent. There is intimal thickening present in the left CCA. Carotid bulb has heterogeneous plaque. The left ICA has mild and heterogeneous plaque. The left middle ICA is tortuous. The left ECA is patent. The left vertebral is occluded. The left subclavian is normal.       MARIO on 23: Preliminary Results  Interpretation Summary    1. No evidence of significant peripheral arterial disease at rest in the right leg. 2. No evidence of significant peripheral arterial disease at rest in the left leg. 3. The right ankle/brachial index is 1.3 and the left ankle/brachial index is 1.2.  4. The right toe/brachial index is 1.0 and the left toe/brachial index is 1.0. Lower Extremity Arterial Findings    MARIO    The right dorsalis pedis artery has biphasic waveforms. The right superficial femoral artery, common femoral artery, popliteal artery and posterior tibial artery has triphasic waveforms. The left superficial femoral artery, common femoral artery, popliteal artery, posterior tibial artery and dorsalis pedis artery has triphasic waveforms. PFTS-FEV1:       AB.38/44/90/25    EKG on 23:    Assessment:     Principal Problem:    NSTEMI (non-ST elevated myocardial infarction) ([facility] [address] (2023)        Plan:   The risk and benefit of surgery were reviewed with patient and family and all questions answered and the patient wishes to proceed. Risk include infection, bleeding, stroke, heart attack, irregular heart rhythm, kidney failure and death. Surgery is scheduled for  with Dr. Ethan Moore. STS Risk Calculator V2.81 - Discussed by surgeon with patient.    Procedure: AVR + CAB  Risk of Mortality:  4.222%  Renal Failure:  3.585%  Permanent Stroke:  2.306%  Prolonged Ventilation:  14.175%  DSW Infection:  0.297%  Reoperation:  4.819%  Morbidity or Mortality:  22.156%  Short Length of Stay:  19.529%  Long Length of Stay:  9.196%      Treatment Plan:    CAD/NSTEMI: OSH, + troponins, cath results with MVD  - Continue ASA 81mg and crestor  - Continue Coreg 6.25mg BID  - Heparin drip for anticoagulation  - Pre-op testing being completed    HTN: PTA medications Micardis (telmisartan) 20mg daily  - Continue coreg  - Suspend losartan 50mg daily (started inpatient) in prep for surgery    Dyslipidemia: PTA Crestor 10mg and Zetia 10mg  - Crestor increased to 20mg daily by Cardiology  - Continue Zetia    Aortic Stenosis: Asymptomatic pre-operatively, noted on ECHO  - Continue ASA  - Plan for AVR bioprosthetic on Friday    Hiatal Hernia: Noted on xray on admission  - No acute interventions     Assessment: Plan for surgery (CABG and AVR) on Friday with Dr. Nicki Ordaz. Pre-op testing pending. Continue heparin drip for anticoagulation, plan to stop night before surgery. Time Spent: I spent 50 minutes on this consult.      Signed By: Quang Abraham NP     February 28, 2023

## 2023-02-28 NOTE — PROGRESS NOTES
Charting and patient care of Ruth Hylton by Ike Butler RN from 0730 to 2000 was supervised and reviewed by this RN.

## 2023-02-28 NOTE — PROGRESS NOTES
Rounded on [employer] patients and provided Anointing of the Sick and Communion  at request of patient.     Trixie Dewey

## 2023-02-28 NOTE — PROGRESS NOTES
Problem: Falls - Risk of  Goal: *Absence of Falls  Description: Document Jos Holt Fall Risk and appropriate interventions in the flowsheet.   Outcome: Resolved/Met     Problem: Patient Education: Go to Patient Education Activity  Goal: Patient/Family Education  Outcome: Resolved/Met

## 2023-02-28 NOTE — PROGRESS NOTES
0730:  Bedside shift change report given to Dora Benz, RNs (oncoming nurse) by Jass Silva RN (offgoing nurse). Report included the following information SBAR. 1930: Bedside shift change report given to Jass Silva RN (oncoming nurse) by Dora Benz RNs (offgoing nurse). Report included the following information SBAR.           Problem: Patient Education: Go to Patient Education Activity  Goal: Patient/Family Education  Outcome: Progressing Towards Goal     Problem: Unstable Angina/NSTEMI: Discharge Outcomes  Goal: *Hemodynamically stable  Outcome: Progressing Towards Goal  Goal: *Stable cardiac rhythm  Outcome: Progressing Towards Goal  Goal: *Lungs clear or at baseline  Outcome: Progressing Towards Goal  Goal: *Optimal pain control at patient's stated goal  Outcome: Progressing Towards Goal  Goal: *Identifies cardiac risk factors  Outcome: Progressing Towards Goal  Goal: *Verbalizes home exercise program, activity guidelines, cardiac precautions  Outcome: Progressing Towards Goal  Goal: *Verbalizes understanding and describes prescribed diet  Outcome: Progressing Towards Goal  Goal: *Verbalizes name, dosage, time, side effects, and number of days to continue medications  Outcome: Progressing Towards Goal  Goal: *Anxiety reduced or absent  Outcome: Progressing Towards Goal  Goal: *Understands and describes signs and symptoms to report to providers(Stroke Metric)  Outcome: Progressing Towards Goal  Goal: *Describes follow-up/return visits to physicians  Outcome: Progressing Towards Goal  Goal: *Describes available resources and support systems  Outcome: Progressing Towards Goal  Goal: *Influenza immunization  Outcome: Progressing Towards Goal  Goal: *Pneumococcal immunization  Outcome: Progressing Towards Goal  Goal: *Describes smoking cessation resources  Outcome: Progressing Towards Goal     Problem: Patient Education: Go to Patient Education Activity  Goal: Patient/Family Education  Outcome: Progressing Towards Goal Problem: CABG: Pre-Op Day  Goal: Off Pathway (Use only if patient is Off Pathway)  Outcome: Progressing Towards Goal  Goal: Activity/Safety  Outcome: Progressing Towards Goal  Goal: Consults, if ordered  Outcome: Progressing Towards Goal  Goal: Diagnostic Test/Procedures  Outcome: Progressing Towards Goal  Goal: Nutrition/Diet  Outcome: Progressing Towards Goal  Goal: Medications  Outcome: Progressing Towards Goal  Goal: Treatments/Interventions/Procedures  Outcome: Progressing Towards Goal  Goal: Discharge Planning  Outcome: Progressing Towards Goal  Goal: Psychosocial  Outcome: Progressing Towards Goal  Goal: *Hemodynamically stable  Outcome: Progressing Towards Goal  Goal: *Consent obtained, permits and diagnostics complete  Outcome: Progressing Towards Goal     Problem: CABG: Day of Surgery (Initiate SCIP measures for post-op care)  Goal: Off Pathway (Use only if patient is Off Pathway)  Outcome: Progressing Towards Goal  Goal: Activity/Safety  Outcome: Progressing Towards Goal  Goal: Consults, if ordered  Outcome: Progressing Towards Goal  Goal: Diagnostic Test/Procedures  Outcome: Progressing Towards Goal  Goal: Nutrition/Diet  Outcome: Progressing Towards Goal  Goal: Medications  Outcome: Progressing Towards Goal  Goal: Respiratory  Outcome: Progressing Towards Goal  Goal: Treatments/Interventions/Procedures  Outcome: Progressing Towards Goal  Goal: Psychosocial  Outcome: Progressing Towards Goal  Goal: *Hemodynamically stable (eg: Cardiac output/index; pulmonary arterial pressures; mixed venous oxygen saturation within set parameters)  Outcome: Progressing Towards Goal  Goal: *Lungs clear or at baseline  Outcome: Progressing Towards Goal  Goal: *Stable cardiac rhythm  Outcome: Progressing Towards Goal  Goal: *Afebrile  Outcome: Progressing Towards Goal  Goal: *Follows simple commands post anesthesia  Outcome: Progressing Towards Goal  Goal: *Orients easily following extubation  Outcome: Progressing Towards Goal  Goal: *Optimal pain control at patient's stated goal  Outcome: Progressing Towards Goal     Problem: CABG: Post-Op Day 1  Goal: Off Pathway (Use only if patient is Off Pathway)  Outcome: Progressing Towards Goal  Goal: Activity/Safety  Outcome: Progressing Towards Goal  Goal: Consults, if ordered  Outcome: Progressing Towards Goal  Goal: Diagnostic Test/Procedures  Outcome: Progressing Towards Goal  Goal: Nutrition/Diet  Outcome: Progressing Towards Goal  Goal: Medications  Outcome: Progressing Towards Goal  Goal: Respiratory  Outcome: Progressing Towards Goal  Goal: Treatments/Interventions/Procedures  Outcome: Progressing Towards Goal  Goal: Psychosocial  Outcome: Progressing Towards Goal  Goal: *Hemodynamically stable without vasoactive medications  Outcome: Progressing Towards Goal  Goal: *Lungs clear or at baseline  Outcome: Progressing Towards Goal  Goal: *Mechanical ventilation discontinued  Outcome: Progressing Towards Goal  Goal: *Optimal pain control at patient's stated goal  Outcome: Progressing Towards Goal  Goal: *Demonstrates progressive activity  Outcome: Progressing Towards Goal  Goal: *Tolerating diet  Outcome: Progressing Towards Goal  Goal: *Level of consciousness returns to baseline  Outcome: Progressing Towards Goal     Problem: CABG: Post-Op Day 2/Transfer Day  Goal: Off Pathway (Use only if patient is Off Pathway)  Outcome: Progressing Towards Goal  Goal: Activity/Safety  Outcome: Progressing Towards Goal  Goal: Consults, if ordered  Outcome: Progressing Towards Goal  Goal: Diagnostic Test/Procedures  Outcome: Progressing Towards Goal  Goal: Nutrition/Diet  Outcome: Progressing Towards Goal  Goal: Medications  Outcome: Progressing Towards Goal  Goal: Discharge Planning  Outcome: Progressing Towards Goal  Goal: Respiratory  Outcome: Progressing Towards Goal  Goal: Treatments/Interventions/Procedures  Outcome: Progressing Towards Goal  Goal: Psychosocial  Outcome: Progressing Towards Goal  Goal: *Hemodynamically stable without vasoactive medications  Outcome: Progressing Towards Goal  Goal: *Lungs clear or at baseline  Outcome: Progressing Towards Goal  Goal: *Optimal pain control at patient's stated goal  Outcome: Progressing Towards Goal  Goal: *Demonstrates progressive activity  Outcome: Progressing Towards Goal  Goal: *Tolerating diet  Outcome: Progressing Towards Goal

## 2023-02-28 NOTE — PROGRESS NOTES
[facility] [facility] Adult  Hospitalist Group                                                                                          Hospitalist Progress Note  Hilaria Jiménez MD  Answering service: [phone] from in house phone        Date of Service:  2023  NAME:  Moose Mcleod  :    MRN:  [id number]       Admission Summary: This is a 78-year-old gentleman who presented to [address] [place] [address] was chest pain and was diagnosed with an NSTEMI. He underwent left cardiac catheterization which showed multivessel CAD,he was then transferred to [facility] for cardiac surgery evaluation for surgical revascularization. Interval history / Subjective:   No chest pain or shortness of breath. He asked if he can eat  Discussed with cardiac surgery team, no surgery today, okay to eat. Assessment & Plan:     Non-ST elevation myocardial infarction with multivessel CAD. Transfer from [address] [place] [address] for evaluation for surgical revascularization. Continue with aspirin, carvedilol, losartan, Crestor and Zetia. On heparin drip. Cardiac surgery team to evaluate. Moderate  aortic stenosis: Management per cardiac surgery       Hypertension: Titrate medications  Hyperlipidemia: Continue Crestor and Zetia                                                                                          Code status: Full code  Prophylaxis: On therapeutic heparin  Care Plan discussed with: Patient    Anticipated Disposition:  To be determined  Inpatient  Cardiac monitoring: Telemetry     Hospital Problems  Date Reviewed: 2023            Codes Class Noted POA    * (Principal) NSTEMI (non-ST elevated myocardial infarction) [facility]) ICD-10-CM: I21.4  ICD-9-CM: 410.70  2023 Yes           Social Determinants of Health     Tobacco Use: Medium Risk    Smoking Tobacco Use: Former    Smokeless Tobacco Use: Never    Passive Exposure: Not on file   Alcohol Use: Not on file   Financial Resource Strain: Not on file   Food Insecurity: Not on file   Transportation Needs: Not on file   Physical Activity: Not on file   Stress: Not on file   Social Connections: Not on file   Intimate Partner Violence: Not on file   Depression: Not on file   Housing Stability: Not on file       Review of Systems:   A comprehensive review of systems was negative except for that written in the HPI. Vital Signs:    Last 24hrs VS reviewed since prior progress note. Most recent are:  Visit Vitals  /61   Pulse 62   Temp 98.4 °F (36.9 °C)   Resp 16   Wt 107.9 kg (237 lb 14 oz)   SpO2 96%   BMI 33.18 kg/m²         Intake/Output Summary (Last 24 hours) at 2/28/2023 1027  Last data filed at 2/28/2023 0820  Gross per 24 hour   Intake 250 ml   Output 0 ml   Net 250 ml        Physical Examination:     I had a face to face encounter with this patient and independently examined them on 2/28/2023 as outlined below:          General : alert x 3, awake, no acute distress,   HEENT: PEERL, EOMI, moist mucus membrane, TM clear  Neck: supple, no JVD, no meningeal signs  Chest: Clear to auscultation bilaterally   CVS: S1 S2 heard, Capillary refill less than 2 seconds  Abd: soft/ non tender, non distended, BS physiological,   Ext: no clubbing, no cyanosis, no edema, brisk 2+ DP pulses  Neuro/Psych: pleasant mood and affect, CN 2-12 grossly intact, sensory grossly within normal limit, Strength 5/5 in all extremities, DTR 1+ x 4  Skin: warm     Data Review:    Review and/or order of clinical lab test  Review and/or order of tests in the radiology section of CPT  Review and/or order of tests in the medicine section of CPT      I have personally and independently reviewed all pertinent labs, diagnostic studies, imaging, and have provided independent interpretation of the same.      Labs:     Recent Labs     02/28/23 0644 02/27/23  0510   WBC 6.4 7.1   HGB 11.7* 12.4   HCT 35.3* 37.7   * 146*     Recent Labs     02/28/23  0644      K 4.0    CO2 26   BUN 12   CREA 0.84      CA 9.0     Recent Labs     02/28/23  0644   ALT 30   AP 59   TBILI 0.6   TP 6.4   ALB 3.3*   GLOB 3.1     Recent Labs     02/28/23  0644   APTT 27.0      No results for input(s): FE, TIBC, PSAT, FERR in the last 72 hours. No results found for: FOL, RBCF   Recent Labs     02/27/23  1151   PH 7.38   PCO2 44   PO2 90     No results for input(s): CPK, CKNDX, TROIQ in the last 72 hours. No lab exists for component: CPKMB  Lab Results   Component Value Date/Time    Cholesterol, total 110 02/25/2023 02:28 AM    HDL Cholesterol 52 02/25/2023 02:28 AM    LDL, calculated 44.2 02/25/2023 02:28 AM    Triglyceride 69 02/25/2023 02:28 AM    CHOL/HDL Ratio 2.1 02/25/2023 02:28 AM     No results found for: [facility]  Lab Results   Component Value Date/Time    Color YELLOW/STRAW 02/27/2023 01:40 PM    Appearance CLEAR 02/27/2023 01:40 PM    Specific gravity 1.015 02/27/2023 01:40 PM    pH (UA) 6.0 02/27/2023 01:40 PM    Protein Negative 02/27/2023 01:40 PM    Glucose Negative 02/27/2023 01:40 PM    Ketone 15 (A) 02/27/2023 01:40 PM    Bilirubin Negative 02/27/2023 01:40 PM    Urobilinogen 1.0 02/27/2023 01:40 PM    Nitrites Negative 02/27/2023 01:40 PM    Leukocyte Esterase Negative 02/27/2023 01:40 PM    Epithelial cells FEW 02/27/2023 01:40 PM    Bacteria Negative 02/27/2023 01:40 PM    WBC 0-4 02/27/2023 01:40 PM    RBC 0-5 02/27/2023 01:40 PM       Notes reviewed from all clinical/nonclinical/nursing services involved in patient's clinical care. Care coordination discussions were held with appropriate clinical/nonclinical/ nursing providers based on care coordination needs. Patients current active Medications were reviewed, considered, added and adjusted based on the clinical condition today. Home Medications were reconciled to the best of my ability given all available resources at the time of admission. Route is PO if not otherwise noted.       Admission Status:[id number]:::1}      Medications Reviewed:     Current Facility-Administered Medications   Medication Dose Route Frequency    aspirin chewable tablet 81 mg  81 mg Oral DAILY    chlorhexidine (PERIDEX) 0.12 % mouthwash 15 mL  15 mL Oral Q12H    ezetimibe (ZETIA) tablet 10 mg  10 mg Oral DAILY    losartan (COZAAR) tablet 50 mg  50 mg Oral QHS    mupirocin (BACTROBAN) 2 % ointment   Both Nostrils Q12H    rosuvastatin (CRESTOR) tablet 20 mg  20 mg Oral QHS    sodium chloride (NS) flush 5-40 mL  5-40 mL IntraVENous Q8H    sodium chloride (NS) flush 5-40 mL  5-40 mL IntraVENous Q8H    0.9% sodium chloride infusion  50 mL/hr IntraVENous CONTINUOUS    0.9% sodium chloride infusion 25 mL  25 mL IntraVENous PRN    bisacodyL (DULCOLAX) suppository 10 mg  10 mg Rectal DAILY PRN    labetaloL (NORMODYNE;TRANDATE) injection 20 mg  20 mg IntraVENous Q4H PRN    morphine injection 2 mg  2 mg IntraVENous Q4H PRN    nitroglycerin (NITROSTAT) tablet 0.4 mg  0.4 mg SubLINGual Q5MIN PRN    sodium chloride (NS) flush 5-40 mL  5-40 mL IntraVENous PRN    sodium chloride (NS) flush 5-40 mL  5-40 mL IntraVENous PRN    promethazine (PHENERGAN) tablet 12.5 mg  12.5 mg Oral Q6H PRN    Or    ondansetron (ZOFRAN) injection 4 mg  4 mg IntraVENous Q6H PRN    acetaminophen (TYLENOL) tablet 650 mg  650 mg Oral Q6H PRN    Or    acetaminophen (TYLENOL) suppository 650 mg  650 mg Rectal Q6H PRN    heparin 25,000 units in D5W 250 ml infusion  9-25 Units/kg/hr IntraVENous TITRATE    nitroglycerin (NITROSTAT) tablet 0.4 mg  0.4 mg SubLINGual Q5MIN PRN    morphine injection 2 mg  2 mg IntraVENous Q4H PRN    carvediloL (COREG) tablet 6.25 mg  6.25 mg Oral BID WITH MEALS     ______________________________________________________________________  EXPECTED LENGTH OF STAY: - - -  ACTUAL LENGTH OF STAY:          0                 Farheen Falcon MD

## 2023-02-28 NOTE — PROGRESS NOTES
Transitions of Care Plan  RUR: 6% - low  Clinical Update: [facility] transfer for CABG w AVR; timing TBD - likely Friday  Consults: Eloisa  Baseline: ambulates w cane; resides w wife at [facility] [address]  Barriers to Discharge: medical  Disposition:  [address] anticipated post surgery  Estimated Discharge Date: 2+ days    Reason for Admission:  CABG w AVR                   RUR Score:          6% - low           Plan for utilizing home health:     Yes - will follow post surgical progress     PCP: First and Last name:  Qiana Wagner MD     Name of Practice:    Are you a current patient: Yes/No:    Approximate date of last visit:    Can you participate in a virtual visit with your PCP:                     Current Advanced Directive/Advance Care Plan: Full Code      Healthcare Decision Maker:   Click here to complete [address] including selection of the Healthcare Decision Maker Relationship (ie \"Primary\")           Spouse Rossi Cover - p: [phone]                  Transition of Care Plan:                      Chart review assessment for patient who transferred from [facility] to [facility] for CABG and AVR. Patient resides in independent living at [facility]. ADA accessible living situation with zero entry steps. Resides with his wife. [email or web address]. For disposition planning - if patient needs rehab - he is eligible for [facility]. CM will continue to follow.     Yara Andersen, MPH  Care Manager [facility]  Available via [address] [place] [address] or

## 2023-02-28 NOTE — H&P
[address]  HISTORY AND PHYSICAL    Name:  Kelly Bledsoe  MR#:  [id number]  :  1945  ACCOUNT #:  [de-identified]  ADMIT DATE:  2023      The patient was seen, evaluated, and admitted by me on 2023. PRIMARY CARE PHYSICIAN:  Dr. Saint Ngo INFORMATION:  The patient and review of electronic medical record from [facility]. CHIEF COMPLAINT:  Chest pain. HISTORY OF PRESENT ILLNESS:  This 75-year-old man with past medical history significant for hypertension and dyslipidemia presented at [facility] with chest pain. The chest pain started on the day of presentation at the emergency room, located at the center of the chest, no radiation, and described as tightness in the chest.  No known aggravating or relieving factors. The patient was found to have elevated troponin level and ischemic changes on EKG. The patient was admitted to [facility] for evaluation and treatment of suspected non-ST elevation myocardial infarction. Cardiology consult was requested and the patient subsequently underwent left heart catheterization which revealed multivessel coronary artery disease. The patient is being transferred to [facility] for evaluation by Cardiothoracic for CABG. The patient was also found to have moderate aortic stenosis. The patient also required evaluation for valve replacement as well. The patient has no record of prior admission to this hospital.    PAST MEDICAL HISTORY:  Hypertension and dyslipidemia. ALLERGIES:  NO KNOWN DRUG ALLERGIES. MEDICATIONS:  Prior to admission;  1.  Crestor 10 mg daily. 2.  Zetia 10 mg daily. 3.  Micardis 20 mg daily. FAMILY HISTORY:  This was reviewed. His father had heart disease. PAST SURGICAL HISTORY:  Not significant. SOCIAL HISTORY:  The patient is a former smoker. No history of alcohol abuse.     REVIEW OF SYSTEMS:  HEAD, EYES, EARS, NOSE AND THROAT:  No headache, no dizziness, no blurring of vision, and no photophobia. RESPIRATORY SYSTEM:  No cough, no shortness of breath, and no hemoptysis. CARDIOVASCULAR SYSTEM:  This is positive for chest pain. No orthopnea and no palpitation. GASTROINTESTINAL SYSTEM:  No nausea or vomiting. No diarrhea and no constipation. GENITOURINARY SYSTEM:  No dysuria, no urgency and no frequency. All other systems are reviewed and they are negative. PHYSICAL EXAMINATION:  GENERAL APPEARANCE:  The patient appeared ill and in moderate distress. VITAL SIGNS:  Temperature 97.8, pulse 66, blood pressure 158/59, respiratory rate of 15, and oxygen saturation 96%. HEAD:  Normocephalic, atraumatic. EYES:  Normal eye movement. No redness, no drainage, and no discharge. EARS:  Normal external ears with no obvious drainage. NOSE:  No deformity and no drainage. MOUTH AND THROAT:  No visible oral lesion. NECK:  Neck is supple. No JVD and no thyromegaly. CHEST:  Clear breath sounds. No wheezing and no crackles. HEART:  Normal S1 and S2, regular. Cardiac murmur noted. ABDOMEN:  Soft and nontender. Normal bowel sounds. CNS:  Alert and oriented x3. No gross focal neurological deficit. EXTREMITIES:  No edema. Pulses 2+ bilaterally. MUSCULOSKELETAL SYSTEM:  No obvious joint deformity and swelling. SKIN:  No active skin lesions seen in the exposed part of the body. PSYCHIATRY:  Normal mood and affect. LYMPHATIC SYSTEM:  No cervical lymphadenopathy. DIAGNOSTIC DATA:  The EKG shows normal sinus rhythm and nonspecific ST and T-waves abnormalities concerning for ischemia. LABORATORY DATA:  Urinalysis is significant for negative nitrite, negative leukocyte esterase, and negative bacteria. Hematology; WBC 7.1, hemoglobin 12.4, and hematocrit 146. ASSESSMENT:  1.  Non-ST-elevation myocardial infarction. 2.  Moderate aortic stenosis. 3.  Hypertension. 4.  Dyslipidemia.     PLAN:  1.  Non-ST-elevation myocardial infarction. We will admit the patient for further evaluation and treatment. Left heart catheterization revealed multivessel coronary artery disease. We will continue with heparin infusion and aspirin. We will add beta-blocker. Cardiothoracic surgery consult will be requested to assist in further evaluation and treatment. Morphine and nitro as needed for pain control. Supplemental oxygen also as needed. 2.  Moderate aortic stenosis. The patient is relatively stable. We will await further recommendation from the surgeon. The patient may require replacement at the same time with the CABG. 3.  Hypertension. We will continue losartan. We will also add Coreg. Monitor the patient's blood pressure closely. 4.  Dyslipidemia. We will continue with Zetia. 5.  Other issues. Code status, the patient is a full code. The patient is on full-dose heparin for treatment of suspected non-ST elevation myocardial infarction. Because of that there is no need for DVT prophylaxis. FUNCTIONAL STATUS PRIOR TO ADMISSION:  The patient came from home. The patient is ambulatory with no assistive device. COVID PRECAUTION:  The patient was wearing a face mask. I was wearing a face mask and gloves for this patient's encounter. Critical care was performed for this encounter. CRITICAL CARE ATTESTATION:   I had a face to face encounter with the patient, reviewed and interpreted patient data including clinical events, labs, images, vital signs, I/O's, and examined patient. I have discussed the case and the plan and management of the patient's care with the consulting services, the bedside nurses and necessary ancillary providers. NOTE OF PERSONAL INVOLVEMENT IN CARE   This patient has a high probability of imminent, clinically significant deterioration, which requires the highest level of preparedness to intervene urgently.  I participated in the decision-making and personally managed or directed the management of the following life and organ supporting interventions that required my frequent assessment to treat or prevent imminent deterioration. I personally spent 45 minutes of critical care time. This is time spent at this critically ill patient's bedside actively involved in patient care as well as the coordination of care and discussions with the patient's family. This does not include any procedural time which has been billed separately.      Bhupendra Jovel MD      [id number]  D:  02/28/2023 5:59  T:  02/28/2023 7:42  JOB #:  [id number]  CC:   Azeem Avila MD

## 2023-03-01 LAB
COMMENT, HOLDF: NORMAL
LEFT CCA DIST DIAS: 16 CM/S
LEFT CCA DIST SYS: 65.5 CM/S
LEFT CCA PROX DIAS: 15.7 CM/S
LEFT CCA PROX SYS: 96.1 CM/S
LEFT ECA DIAS: 8.4 CM/S
LEFT ECA SYS: 103.4 CM/S
LEFT ICA DIST DIAS: 28 CM/S
LEFT ICA DIST SYS: 92.8 CM/S
LEFT ICA MID DIAS: 24.1 CM/S
LEFT ICA MID SYS: 73.3 CM/S
LEFT ICA PROX DIAS: 40.4 CM/S
LEFT ICA PROX SYS: 149.3 CM/S
LEFT ICA/CCA SYS: 2.3 NO UNITS
RIGHT CCA DIST DIAS: 10.3 CM/S
RIGHT CCA DIST SYS: 48.1 CM/S
RIGHT CCA PROX DIAS: 18.1 CM/S
RIGHT CCA PROX SYS: 93.8 CM/S
RIGHT ECA DIAS: 6.4 CM/S
RIGHT ECA SYS: 80.7 CM/S
RIGHT ICA DIST DIAS: 32.1 CM/S
RIGHT ICA DIST SYS: 110.7 CM/S
RIGHT ICA MID DIAS: 17.7 CM/S
RIGHT ICA MID SYS: 105.1 CM/S
RIGHT ICA PROX DIAS: 20.3 CM/S
RIGHT ICA PROX SYS: 115.5 CM/S
RIGHT ICA/CCA SYS: 2.4 NO UNITS
RIGHT VERTEBRAL DIAS: 11.7 CM/S
RIGHT VERTEBRAL SYS: 53.4 CM/S
SAMPLES BEING HELD,HOLD: NORMAL
SARS-COV-2 RDRP RESP QL NAA+PROBE: NOT DETECTED
SARS-COV-2 RNA RESP QL NAA+PROBE: NOT DETECTED
SOURCE, COVRS: NORMAL
SOURCE, COVRS: NORMAL
TSH SERPL DL<=0.05 MIU/L-ACNC: 2.33 UIU/ML (ref 0.36–3.74)
UFH PPP CHRO-ACNC: 0.5 IU/ML
UFH PPP CHRO-ACNC: 0.51 IU/ML
VAS LEFT SUBCLAVIAN PROX EDV: 0 CM/S
VAS LEFT SUBCLAVIAN PROX PSV: 164.8 CM/S
VAS RIGHT SUBCLAVIAN PROX EDV: 0 CM/S
VAS RIGHT SUBCLAVIAN PROX PSV: 99 CM/S

## 2023-03-01 PROCEDURE — 74011250636 HC RX REV CODE- 250/636: Performed by: NURSE PRACTITIONER

## 2023-03-01 PROCEDURE — APPSS45 APP SPLIT SHARED TIME 31-45 MINUTES: Performed by: NURSE PRACTITIONER

## 2023-03-01 PROCEDURE — 74011250637 HC RX REV CODE- 250/637: Performed by: INTERNAL MEDICINE

## 2023-03-01 PROCEDURE — 85520 HEPARIN ASSAY: CPT

## 2023-03-01 PROCEDURE — 74011000250 HC RX REV CODE- 250: Performed by: INTERNAL MEDICINE

## 2023-03-01 PROCEDURE — 74011250636 HC RX REV CODE- 250/636: Performed by: INTERNAL MEDICINE

## 2023-03-01 PROCEDURE — 65660000001 HC RM ICU INTERMED STEPDOWN

## 2023-03-01 PROCEDURE — 65270000046 HC RM TELEMETRY

## 2023-03-01 PROCEDURE — 87635 SARS-COV-2 COVID-19 AMP PRB: CPT

## 2023-03-01 PROCEDURE — 84443 ASSAY THYROID STIM HORMONE: CPT

## 2023-03-01 PROCEDURE — 36415 COLL VENOUS BLD VENIPUNCTURE: CPT

## 2023-03-01 RX ADMIN — CHLORHEXIDINE GLUCONATE 15 ML: 1.2 RINSE ORAL at 08:50

## 2023-03-01 RX ADMIN — SODIUM CHLORIDE, PRESERVATIVE FREE 10 ML: 5 INJECTION INTRAVENOUS at 14:29

## 2023-03-01 RX ADMIN — MUPIROCIN: 20 OINTMENT TOPICAL at 08:50

## 2023-03-01 RX ADMIN — CHLORHEXIDINE GLUCONATE 15 ML: 1.2 RINSE ORAL at 21:01

## 2023-03-01 RX ADMIN — SODIUM CHLORIDE 50 ML/HR: 9 INJECTION, SOLUTION INTRAVENOUS at 21:02

## 2023-03-01 RX ADMIN — ASPIRIN 81 MG CHEWABLE TABLET 81 MG: 81 TABLET CHEWABLE at 08:51

## 2023-03-01 RX ADMIN — ROSUVASTATIN 20 MG: 10 TABLET, FILM COATED ORAL at 21:01

## 2023-03-01 RX ADMIN — CARVEDILOL 6.25 MG: 6.25 TABLET, FILM COATED ORAL at 08:51

## 2023-03-01 RX ADMIN — MUPIROCIN: 20 OINTMENT TOPICAL at 21:01

## 2023-03-01 RX ADMIN — HEPARIN SODIUM 11 UNITS/KG/HR: 10000 INJECTION, SOLUTION INTRAVENOUS at 05:58

## 2023-03-01 RX ADMIN — SODIUM CHLORIDE, PRESERVATIVE FREE 10 ML: 5 INJECTION INTRAVENOUS at 21:01

## 2023-03-01 RX ADMIN — CARVEDILOL 6.25 MG: 6.25 TABLET, FILM COATED ORAL at 17:27

## 2023-03-01 RX ADMIN — SODIUM CHLORIDE, PRESERVATIVE FREE 10 ML: 5 INJECTION INTRAVENOUS at 05:33

## 2023-03-01 RX ADMIN — EZETIMIBE 10 MG: 10 TABLET ORAL at 08:51

## 2023-03-01 RX ADMIN — SODIUM CHLORIDE 50 ML/HR: 9 INJECTION, SOLUTION INTRAVENOUS at 03:15

## 2023-03-01 NOTE — PROGRESS NOTES
[facility] FLOOR Progress Note    Admit Date: 2023      Subjective:   Pt seen with Dr. Rachel Black. Afebrile, room air. No CP, dyspnea. Objective:     Visit Vitals  BP (!) 124/51 (BP 1 Location: Left arm, BP Patient Position: At rest)   Pulse 60   Temp 98.2 °F (36.8 °C)   Resp 14   Wt 237 lb 10.5 oz (107.8 kg)   SpO2 96%   BMI 33.15 kg/m²     Temp (24hrs), Av.2 °F (36.8 °C), Min:98 °F (36.7 °C), Max:98.5 °F (36.9 °C)      Last 24hr Input/Output:    Intake/Output Summary (Last 24 hours) at 3/1/2023 0920  Last data filed at 3/1/2023 0849  Gross per 24 hour   Intake 1706.41 ml   Output 0 ml   Net 1706.41 ml        EKG/Rhythm:   EKG RESULTS       Procedure Component Value Units Date/Time    EKG, 12 LEAD, INITIAL [[id number]] Collected: 23 0335    Order Status: Completed Updated: 23     Ventricular Rate 64 BPM      Atrial Rate 64 BPM      P-R Interval 172 ms      QRS Duration 98 ms      Q-T Interval 392 ms      QTC Calculation (Bezet) 404 ms      Calculated P Axis 63 degrees      Calculated R Axis -5 degrees      Calculated T Axis 28 degrees      Diagnosis --     Normal sinus rhythm  Confirmed by Sourav Culver MD, An ([id number]) on 2023 6:12:47 PM      EKG, 12 LEAD, INITIAL [[id number]] Collected: 23 1209    Order Status: Completed Updated: 23 181     Ventricular Rate 70 BPM      Atrial Rate 70 BPM      P-R Interval 168 ms      QRS Duration 98 ms      Q-T Interval 366 ms      QTC Calculation (Bezet) 395 ms      Calculated P Axis 54 degrees      Calculated R Axis 7 degrees      Calculated T Axis 39 degrees      Diagnosis --     Normal sinus rhythm  Confirmed by Sourav Culver MD, An ([id number]) on 2023 6:12:41 PM              Oxygen: Room air    CXR: 23  IMPRESSION   Minimal bibasilar atelectasis. Moderately large hiatal hernia. .          Admission Weight: Last Weight   Weight: 237 lb 14 oz (107.9 kg) Weight: 237 lb 10.5 oz (107.8 kg)       EXAM:  General: No acute distress   Lungs:   Clear to auscultation bilaterally. Heart:  Regular rate and rhythm, S1, S2 normal, no murmur, click, rub or gallop. Abdomen:   Soft, non-tender. Bowel sounds normal. No masses,  No organomegaly. Extremities:  No edema. PPP   Neurologic:  Gross motor and sensory apparatus intact. Activity: Up with assist    Diet:  Cardiac    Lab Data Reviewed:   Recent Labs     02/28/23  0644   WBC 6.4   HGB 11.7*   HCT 35.3*   *      K 4.0   BUN 12   CREA 0.84            Assessment:     Principal Problem:    NSTEMI (non-ST elevated myocardial infarction) ([facility] [address] (2/24/2023)             Plan/Recommendations/Medical Decision Making:     CAD with NSTEMI on this admission: ASA, Statin, BB. Pre-op education and testing for CAB/AVR continue. Aortic Stenosis: Moderate AS with mild AI. Plan for AVR with CAB  Chronic HFpEF: BP management  HTN: Coreg  HLD: Statin  Hiatal Hernia: Noted on CXR. No current treatment  Thrombocytopenia: Platelets of 554R on 2/28. Continue ASA and continue to monitor.      Signed By: Mehdi Bright NP

## 2023-03-01 NOTE — PROGRESS NOTES
Cardiac Surgery Coordinator- Met with Polina Post and his wife, reinforced pre-op education and encouraged them to verbalize. Answered questions.

## 2023-03-01 NOTE — PROGRESS NOTES
Spiritual Care Assessment/Progress Note  [facility]      NAME: Panda Pollock      MRN: [id number]  AGE: 76 y.o. SEX: male  Quaker Affiliation: Jehovah's witness   Language: English     3/1/2023     Total Time (in minutes): 5     Spiritual Assessment begun in [facility] CV SERVICES UNIT through conversation with:         [x]Patient        [x] Family    [] Friend(s)        Reason for Consult: [facility]     Spiritual beliefs: (Please include comment if needed)     [x] Identifies with a dena tradition:   Jehovah's witness      [x] Supported by a dena community: Epiphany           [] Claims no spiritual orientation:           [] Seeking spiritual identity:                [] Adheres to an individual form of spirituality:           [] Not able to assess:                           Identified resources for coping:      [x] Prayer                               [x] Music                  [] Guided Imagery     [x] Family/friends                 [] Pet visits     [] Devotional reading                         [] Unknown     [] Other:                                               Interventions offered during this visit: (See comments for more details)    Patient Interventions: Affirmation of dena, Communion ([address]), Catharsis/review of pertinent events in supportive environment, Initial/Spiritual assessment, patient floor, Prayer (actual), Prayer (assurance of)     Family/Friend(s):  Affirmation of dena, Communion (Jehovah's witness), Prayer (actual), Prayer (assurance of)     Plan of Care:     [x] Support spiritual and/or cultural needs    [] Support AMD and/or advance care planning process      [] Support grieving process   [] Coordinate Rites and/or Rituals    [] Coordination with community clergy   [] No spiritual needs identified at this time   [] Detailed Plan of Care below (See Comments)  [] Make referral to Music Therapy  [] Make referral to Pet Therapy     [] Make referral to Addiction services  [] Make referral to [facility]  [] Make referral to Spiritual Care Partner  [] No future visits requested        [] Contact Spiritual Care for further referrals     Comments: Mr. Earle Smith was sitting up in his chair eating lunch. His wife was with him. He is scheduled for surgery on Friday. Supported his wife in some questions she had about  contacting a .  Let her know how to do that through the [facility] office and the nurses can do that for her. Also let couple know that Mr. Earle Smith has already received the Sacrament of the Sick for healing. Prayer and communion offered couple.     GERBER More, RN, ACSW, LCSW   Page:  [phone]

## 2023-03-01 NOTE — PROGRESS NOTES
[facility] Adult  Hospitalist Group                                                                                          Hospitalist Progress Note  Aditi Haq MD  Answering service: [phone] from in house phone        Date of Service:  3/1/2023  NAME:  Bassam Szymanski  :  1945  MRN:  [id number]       Admission Summary: This is a 77-year-old gentleman who presented to [facility] was chest pain and was diagnosed with an NSTEMI. He underwent left cardiac catheterization which showed multivessel CAD,he was then transferred to [facility] for cardiac surgery evaluation for surgical revascularization. Interval history / Subjective:   Seen Mr. Shana Marlow earlier, his wife was present. No complaints. Assessment & Plan:     Non-ST elevation myocardial infarction with multivessel CAD. Transfer from [facility] for evaluation for surgical revascularization. Continue with aspirin, carvedilol, losartan, Crestor and Zetia. On heparin drip. Evaluated by cardiac surgery team, who are tentatively scheduled for Friday. Moderate  aortic stenosis: Management per cardiac surgery       Hypertension: Titrate medications  Hyperlipidemia: Continue Crestor and Zetia                                                                                          Code status: Full code  Prophylaxis: On therapeutic heparin  Care Plan discussed with: Patient    Anticipated Disposition:  To be determined  Inpatient  Cardiac monitoring: Telemetry     Hospital Problems  Date Reviewed: 2023            Codes Class Noted POA    * (Principal) NSTEMI (non-ST elevated myocardial infarction) [facility]) ICD-10-CM: I21.4  ICD-9-CM: 410.70  2023 Yes         Social Determinants of Health     Tobacco Use: Medium Risk    Smoking Tobacco Use: Former    Smokeless Tobacco Use: Never    Passive Exposure: Not on file   Alcohol Use: Not on file   Financial Resource Strain: Not on file   Food Insecurity: Not on file   Transportation Needs: Not on file   Physical Activity: Not on file   Stress: Not on file   Social Connections: Not on file   Intimate Partner Violence: Not on file   Depression: Not on file   Housing Stability: Not on file       Review of Systems:   A comprehensive review of systems was negative except for that written in the HPI. Vital Signs:    Last 24hrs VS reviewed since prior progress note. Most recent are:  Visit Vitals  BP (!) 145/54   Pulse 71   Temp 98.3 °F (36.8 °C)   Resp 18   Wt 107.8 kg (237 lb 10.5 oz)   SpO2 97%   BMI 33.15 kg/m²         Intake/Output Summary (Last 24 hours) at 3/1/2023 1829  Last data filed at 3/1/2023 1504  Gross per 24 hour   Intake 2715.36 ml   Output --   Net 2715.36 ml          Physical Examination:     I had a face to face encounter with this patient and independently examined them on 3/1/2023 as outlined below:          General : alert x 3, awake, no acute distress,   HEENT: PEERL, EOMI, moist mucus membrane, TM clear  Neck: supple, no JVD, no meningeal signs  Chest: Clear to auscultation bilaterally   CVS: S1 S2 heard, Capillary refill less than 2 seconds  Abd: soft/ non tender, non distended, BS physiological,   Ext: no clubbing, no cyanosis, no edema, brisk 2+ DP pulses  Neuro/Psych: pleasant mood and affect, CN 2-12 grossly intact, sensory grossly within normal limit, Strength 5/5 in all extremities, DTR 1+ x 4  Skin: warm     Data Review:    Review and/or order of clinical lab test  Review and/or order of tests in the radiology section of CPT  Review and/or order of tests in the medicine section of CPT      I have personally and independently reviewed all pertinent labs, diagnostic studies, imaging, and have provided independent interpretation of the same.      Labs:     Recent Labs     02/28/23  0644 02/27/23  0510   WBC 6.4 7.1   HGB 11.7* 12.4   HCT 35.3* 37.7   * 146*       Recent Labs     02/28/23  0644      K 4.0      CO2 26 BUN 12   CREA 0.84      CA 9.0       Recent Labs     02/28/23  0644   ALT 30   AP 59   TBILI 0.6   TP 6.4   ALB 3.3*   GLOB 3.1       Recent Labs     02/28/23  0644   APTT 27.0        No results for input(s): FE, TIBC, PSAT, FERR in the last 72 hours. No results found for: FOL, RBCF   Recent Labs     02/27/23  1151   PH 7.38   PCO2 44   PO2 90       No results for input(s): CPK, CKNDX, TROIQ in the last 72 hours. No lab exists for component: CPKMB  Lab Results   Component Value Date/Time    Cholesterol, total 110 02/25/2023 02:28 AM    HDL Cholesterol 52 02/25/2023 02:28 AM    LDL, calculated 44.2 02/25/2023 02:28 AM    Triglyceride 69 02/25/2023 02:28 AM    CHOL/HDL Ratio 2.1 02/25/2023 02:28 AM     No results found for: [facility]  Lab Results   Component Value Date/Time    Color YELLOW/STRAW 02/27/2023 01:40 PM    Appearance CLEAR 02/27/2023 01:40 PM    Specific gravity 1.015 02/27/2023 01:40 PM    pH (UA) 6.0 02/27/2023 01:40 PM    Protein Negative 02/27/2023 01:40 PM    Glucose Negative 02/27/2023 01:40 PM    Ketone 15 (A) 02/27/2023 01:40 PM    Bilirubin Negative 02/27/2023 01:40 PM    Urobilinogen 1.0 02/27/2023 01:40 PM    Nitrites Negative 02/27/2023 01:40 PM    Leukocyte Esterase Negative 02/27/2023 01:40 PM    Epithelial cells FEW 02/27/2023 01:40 PM    Bacteria Negative 02/27/2023 01:40 PM    WBC 0-4 02/27/2023 01:40 PM    RBC 0-5 02/27/2023 01:40 PM       Notes reviewed from all clinical/nonclinical/nursing services involved in patient's clinical care. Care coordination discussions were held with appropriate clinical/nonclinical/ nursing providers based on care coordination needs. Patients current active Medications were reviewed, considered, added and adjusted based on the clinical condition today. Home Medications were reconciled to the best of my ability given all available resources at the time of admission. Route is PO if not otherwise noted.       Admission Status:[id number]:::1}      Medications Reviewed:     Current Facility-Administered Medications   Medication Dose Route Frequency    aspirin chewable tablet 81 mg  81 mg Oral DAILY    chlorhexidine (PERIDEX) 0.12 % mouthwash 15 mL  15 mL Oral Q12H    ezetimibe (ZETIA) tablet 10 mg  10 mg Oral DAILY    mupirocin (BACTROBAN) 2 % ointment   Both Nostrils Q12H    rosuvastatin (CRESTOR) tablet 20 mg  20 mg Oral QHS    sodium chloride (NS) flush 5-40 mL  5-40 mL IntraVENous Q8H    sodium chloride (NS) flush 5-40 mL  5-40 mL IntraVENous Q8H    0.9% sodium chloride infusion  50 mL/hr IntraVENous CONTINUOUS    0.9% sodium chloride infusion 25 mL  25 mL IntraVENous PRN    bisacodyL (DULCOLAX) suppository 10 mg  10 mg Rectal DAILY PRN    labetaloL (NORMODYNE;TRANDATE) injection 20 mg  20 mg IntraVENous Q4H PRN    morphine injection 2 mg  2 mg IntraVENous Q4H PRN    nitroglycerin (NITROSTAT) tablet 0.4 mg  0.4 mg SubLINGual Q5MIN PRN    sodium chloride (NS) flush 5-40 mL  5-40 mL IntraVENous PRN    sodium chloride (NS) flush 5-40 mL  5-40 mL IntraVENous PRN    promethazine (PHENERGAN) tablet 12.5 mg  12.5 mg Oral Q6H PRN    Or    ondansetron (ZOFRAN) injection 4 mg  4 mg IntraVENous Q6H PRN    acetaminophen (TYLENOL) tablet 650 mg  650 mg Oral Q6H PRN    Or    acetaminophen (TYLENOL) suppository 650 mg  650 mg Rectal Q6H PRN    heparin 25,000 units in D5W 250 ml infusion  9-25 Units/kg/hr IntraVENous TITRATE    nitroglycerin (NITROSTAT) tablet 0.4 mg  0.4 mg SubLINGual Q5MIN PRN    morphine injection 2 mg  2 mg IntraVENous Q4H PRN    carvediloL (COREG) tablet 6.25 mg  6.25 mg Oral BID WITH MEALS     ______________________________________________________________________  EXPECTED LENGTH OF STAY: 2d 9h  ACTUAL LENGTH OF STAY:          1                 Rodrigue Fofana MD

## 2023-03-01 NOTE — PROGRESS NOTES
0730: Bedside shift change report given to [facility], RNs (oncoming nurse) by Chirag Barrera RN (offgoing nurse). Report included the following information SBAR, MAR, and Recent Results. 1930: Bedside shift change report given to Chirag Barrera RN (oncoming nurse) by Tomas Yates RN (offgoing nurse). Report included the following information SBAR, MAR, and Recent Results.

## 2023-03-01 NOTE — PROGRESS NOTES
Kayce Patrick has been Anointed. He is having a procedure on Friday, I prayed with him and gave him a blessing and assured him of my Prayer.   Father Thierry Rodriguez

## 2023-03-02 LAB — UFH PPP CHRO-ACNC: 0.51 IU/ML

## 2023-03-02 PROCEDURE — 36415 COLL VENOUS BLD VENIPUNCTURE: CPT

## 2023-03-02 PROCEDURE — 74011000250 HC RX REV CODE- 250: Performed by: INTERNAL MEDICINE

## 2023-03-02 PROCEDURE — 74011250637 HC RX REV CODE- 250/637: Performed by: NURSE PRACTITIONER

## 2023-03-02 PROCEDURE — 74011250637 HC RX REV CODE- 250/637: Performed by: INTERNAL MEDICINE

## 2023-03-02 PROCEDURE — 65660000001 HC RM ICU INTERMED STEPDOWN

## 2023-03-02 PROCEDURE — 74011250636 HC RX REV CODE- 250/636: Performed by: INTERNAL MEDICINE

## 2023-03-02 PROCEDURE — 74011250636 HC RX REV CODE- 250/636: Performed by: NURSE PRACTITIONER

## 2023-03-02 PROCEDURE — 85520 HEPARIN ASSAY: CPT

## 2023-03-02 PROCEDURE — 86923 COMPATIBILITY TEST ELECTRIC: CPT

## 2023-03-02 PROCEDURE — 86900 BLOOD TYPING SEROLOGIC ABO: CPT

## 2023-03-02 RX ORDER — LANOLIN ALCOHOL/MO/W.PET/CERES
3 CREAM (GRAM) TOPICAL
Status: DISCONTINUED | OUTPATIENT
Start: 2023-03-02 | End: 2023-03-03

## 2023-03-02 RX ADMIN — EZETIMIBE 10 MG: 10 TABLET ORAL at 08:14

## 2023-03-02 RX ADMIN — HEPARIN SODIUM 11 UNITS/KG/HR: 10000 INJECTION, SOLUTION INTRAVENOUS at 22:52

## 2023-03-02 RX ADMIN — CARVEDILOL 6.25 MG: 6.25 TABLET, FILM COATED ORAL at 16:02

## 2023-03-02 RX ADMIN — SODIUM CHLORIDE, PRESERVATIVE FREE 10 ML: 5 INJECTION INTRAVENOUS at 15:54

## 2023-03-02 RX ADMIN — ASPIRIN 81 MG CHEWABLE TABLET 81 MG: 81 TABLET CHEWABLE at 08:14

## 2023-03-02 RX ADMIN — CHLORHEXIDINE GLUCONATE 15 ML: 1.2 RINSE ORAL at 08:15

## 2023-03-02 RX ADMIN — SODIUM CHLORIDE 50 ML/HR: 9 INJECTION, SOLUTION INTRAVENOUS at 17:44

## 2023-03-02 RX ADMIN — HEPARIN SODIUM 11 UNITS/KG/HR: 10000 INJECTION, SOLUTION INTRAVENOUS at 02:41

## 2023-03-02 RX ADMIN — ROSUVASTATIN 20 MG: 10 TABLET, FILM COATED ORAL at 21:44

## 2023-03-02 RX ADMIN — Medication 3 MG: at 21:44

## 2023-03-02 RX ADMIN — SODIUM CHLORIDE, PRESERVATIVE FREE 10 ML: 5 INJECTION INTRAVENOUS at 21:45

## 2023-03-02 RX ADMIN — MUPIROCIN: 20 OINTMENT TOPICAL at 21:46

## 2023-03-02 RX ADMIN — CHLORHEXIDINE GLUCONATE 15 ML: 1.2 RINSE ORAL at 21:45

## 2023-03-02 RX ADMIN — CARVEDILOL 6.25 MG: 6.25 TABLET, FILM COATED ORAL at 08:14

## 2023-03-02 RX ADMIN — MUPIROCIN: 20 OINTMENT TOPICAL at 08:14

## 2023-03-02 NOTE — PROGRESS NOTES
0730: Bedside shift change report given to [address], RNs (oncoming nurse) by Tracey Wright RN (offgoing nurse). Report included the following information SBAR.      1500: Patient taught how to use incentive spirometer. 1750: Pt currently walking with no assistance around the unit. 1930: Bedside shift change report given to Chandrika Richardson RN (oncoming nurse) by [facility] [facility] RNs (offgoing nurse). Report included the following information SBAR and Kardex.       Problem: Patient Education: Go to Patient Education Activity  Goal: Patient/Family Education  Outcome: Progressing Towards Goal     Problem: Unstable Angina/NSTEMI: Discharge Outcomes  Goal: *Hemodynamically stable  Outcome: Progressing Towards Goal  Goal: *Stable cardiac rhythm  Outcome: Progressing Towards Goal  Goal: *Lungs clear or at baseline  Outcome: Progressing Towards Goal  Goal: *Optimal pain control at patient's stated goal  Outcome: Progressing Towards Goal  Goal: *Identifies cardiac risk factors  Outcome: Progressing Towards Goal  Goal: *Verbalizes home exercise program, activity guidelines, cardiac precautions  Outcome: Progressing Towards Goal  Goal: *Verbalizes understanding and describes prescribed diet  Outcome: Progressing Towards Goal  Goal: *Verbalizes name, dosage, time, side effects, and number of days to continue medications  Outcome: Progressing Towards Goal  Goal: *Anxiety reduced or absent  Outcome: Progressing Towards Goal  Goal: *Understands and describes signs and symptoms to report to providers(Stroke Metric)  Outcome: Progressing Towards Goal  Goal: *Describes follow-up/return visits to physicians  Outcome: Progressing Towards Goal  Goal: *Describes available resources and support systems  Outcome: Progressing Towards Goal  Goal: *Influenza immunization  Outcome: Progressing Towards Goal  Goal: *Pneumococcal immunization  Outcome: Progressing Towards Goal  Goal: *Describes smoking cessation resources  Outcome: Progressing Towards Goal     Problem: Patient Education: Go to Patient Education Activity  Goal: Patient/Family Education  Outcome: Progressing Towards Goal     Problem: CABG: Pre-Op Day  Goal: Off Pathway (Use only if patient is Off Pathway)  Outcome: Progressing Towards Goal  Goal: Activity/Safety  Outcome: Progressing Towards Goal  Goal: Consults, if ordered  Outcome: Progressing Towards Goal  Goal: Diagnostic Test/Procedures  Outcome: Progressing Towards Goal  Goal: Nutrition/Diet  Outcome: Progressing Towards Goal  Goal: Medications  Outcome: Progressing Towards Goal  Goal: Treatments/Interventions/Procedures  Outcome: Progressing Towards Goal  Goal: Discharge Planning  Outcome: Progressing Towards Goal  Goal: Psychosocial  Outcome: Progressing Towards Goal  Goal: *Hemodynamically stable  Outcome: Progressing Towards Goal  Goal: *Consent obtained, permits and diagnostics complete  Outcome: Progressing Towards Goal     Problem: CABG: Day of Surgery (Initiate SCIP measures for post-op care)  Goal: Off Pathway (Use only if patient is Off Pathway)  Outcome: Progressing Towards Goal  Goal: Activity/Safety  Outcome: Progressing Towards Goal  Goal: Consults, if ordered  Outcome: Progressing Towards Goal  Goal: Diagnostic Test/Procedures  Outcome: Progressing Towards Goal  Goal: Nutrition/Diet  Outcome: Progressing Towards Goal  Goal: Medications  Outcome: Progressing Towards Goal  Goal: Respiratory  Outcome: Progressing Towards Goal  Goal: Treatments/Interventions/Procedures  Outcome: Progressing Towards Goal  Goal: Psychosocial  Outcome: Progressing Towards Goal  Goal: *Hemodynamically stable (eg: Cardiac output/index; pulmonary arterial pressures; mixed venous oxygen saturation within set parameters)  Outcome: Progressing Towards Goal  Goal: *Lungs clear or at baseline  Outcome: Progressing Towards Goal  Goal: *Stable cardiac rhythm  Outcome: Progressing Towards Goal  Goal: *Afebrile  Outcome: Progressing Towards Goal  Goal: *Follows simple commands post anesthesia  Outcome: Progressing Towards Goal  Goal: *Orients easily following extubation  Outcome: Progressing Towards Goal  Goal: *Optimal pain control at patient's stated goal  Outcome: Progressing Towards Goal     Problem: CABG: Post-Op Day 1  Goal: Off Pathway (Use only if patient is Off Pathway)  Outcome: Progressing Towards Goal  Goal: Activity/Safety  Outcome: Progressing Towards Goal  Goal: Consults, if ordered  Outcome: Progressing Towards Goal  Goal: Diagnostic Test/Procedures  Outcome: Progressing Towards Goal  Goal: Nutrition/Diet  Outcome: Progressing Towards Goal  Goal: Medications  Outcome: Progressing Towards Goal  Goal: Respiratory  Outcome: Progressing Towards Goal  Goal: Treatments/Interventions/Procedures  Outcome: Progressing Towards Goal  Goal: Psychosocial  Outcome: Progressing Towards Goal  Goal: *Hemodynamically stable without vasoactive medications  Outcome: Progressing Towards Goal  Goal: *Lungs clear or at baseline  Outcome: Progressing Towards Goal  Goal: *Mechanical ventilation discontinued  Outcome: Progressing Towards Goal  Goal: *Optimal pain control at patient's stated goal  Outcome: Progressing Towards Goal  Goal: *Demonstrates progressive activity  Outcome: Progressing Towards Goal  Goal: *Tolerating diet  Outcome: Progressing Towards Goal  Goal: *Level of consciousness returns to baseline  Outcome: Progressing Towards Goal     Problem: CABG: Post-Op Day 2/Transfer Day  Goal: Off Pathway (Use only if patient is Off Pathway)  Outcome: Progressing Towards Goal  Goal: Activity/Safety  Outcome: Progressing Towards Goal  Goal: Consults, if ordered  Outcome: Progressing Towards Goal  Goal: Diagnostic Test/Procedures  Outcome: Progressing Towards Goal  Goal: Nutrition/Diet  Outcome: Progressing Towards Goal  Goal: Medications  Outcome: Progressing Towards Goal  Goal: Discharge Planning  Outcome: Progressing Towards Goal  Goal: Respiratory  Outcome: Progressing Towards Goal  Goal: Treatments/Interventions/Procedures  Outcome: Progressing Towards Goal  Goal: Psychosocial  Outcome: Progressing Towards Goal  Goal: *Hemodynamically stable without vasoactive medications  Outcome: Progressing Towards Goal  Goal: *Lungs clear or at baseline  Outcome: Progressing Towards Goal  Goal: *Optimal pain control at patient's stated goal  Outcome: Progressing Towards Goal  Goal: *Demonstrates progressive activity  Outcome: Progressing Towards Goal  Goal: *Tolerating diet  Outcome: Progressing Towards Goal

## 2023-03-02 NOTE — PROGRESS NOTES
Cardiac Surgery Coordinator- Met with Rusty Sorensen reinforced pre-op education. He verbalized concerns regarding sleeping this evening, spoke to NP will prescribe Melatonin. Offered emotional support. Will continue to follow for educational and emotional support.

## 2023-03-02 NOTE — PROGRESS NOTES
Provided pastoral care visit to [facility] patient. Did not include sacramental care.      Dejan Vann

## 2023-03-02 NOTE — PROGRESS NOTES
[facility] FLOOR Progress Note    Admit Date: 2023      Subjective:   Pt seen with Dr. Kiran Oliver. Afebrile, room air. No CP, dyspnea. Objective:     Visit Vitals  /74 (BP 1 Location: Left upper arm, BP Patient Position: At rest;Sitting)   Pulse (!) 59   Temp 98.4 °F (36.9 °C)   Resp 17   Wt 238 lb 15.7 oz (108.4 kg)   SpO2 92%   BMI 33.33 kg/m²     Temp (24hrs), Av.5 °F (36.9 °C), Min:98.3 °F (36.8 °C), Max:98.7 °F (37.1 °C)      Last 24hr Input/Output:    Intake/Output Summary (Last 24 hours) at 3/2/2023 1224  Last data filed at 3/2/2023 1101  Gross per 24 hour   Intake 1567.64 ml   Output 0 ml   Net 1567.64 ml        EKG/Rhythm:   EKG RESULTS       Procedure Component Value Units Date/Time    EKG, 12 LEAD, INITIAL [[id number]] Collected: 23 0335    Order Status: Completed Updated: 23     Ventricular Rate 64 BPM      Atrial Rate 64 BPM      P-R Interval 172 ms      QRS Duration 98 ms      Q-T Interval 392 ms      QTC Calculation (Bezet) 404 ms      Calculated P Axis 63 degrees      Calculated R Axis -5 degrees      Calculated T Axis 28 degrees      Diagnosis --     Normal sinus rhythm  Confirmed by Jeremy Knowles MD, An ([id number]) on 2023 6:12:47 PM      EKG, 12 LEAD, INITIAL [[id number]] Collected: 23 1209    Order Status: Completed Updated: 23     Ventricular Rate 70 BPM      Atrial Rate 70 BPM      P-R Interval 168 ms      QRS Duration 98 ms      Q-T Interval 366 ms      QTC Calculation (Bezet) 395 ms      Calculated P Axis 54 degrees      Calculated R Axis 7 degrees      Calculated T Axis 39 degrees      Diagnosis --     Normal sinus rhythm  Confirmed by Jeremy Knowles MD, An ([id number]) on 2023 6:12:41 PM              Oxygen: Room air    CXR: 23  IMPRESSION   Minimal bibasilar atelectasis. Moderately large hiatal hernia. .          Admission Weight: Last Weight   Weight: 237 lb 14 oz (107.9 kg) Weight: 238 lb 15.7 oz (108.4 kg)       EXAM:  General: No acute distress   Lungs: Clear to auscultation bilaterally. Heart:  Regular rate and rhythm, S1, S2 normal, no murmur, click, rub or gallop. Abdomen:   Soft, non-tender. Bowel sounds normal. No masses,  No organomegaly. Extremities:  No edema. PPP   Neurologic:  Gross motor and sensory apparatus intact. Activity: Up with assist    Diet:  Cardiac    Lab Data Reviewed:   Recent Labs     02/28/23  0644   WBC 6.4   HGB 11.7*   HCT 35.3*   *      K 4.0   BUN 12   CREA 0.84            Assessment:     Principal Problem:    NSTEMI (non-ST elevated myocardial infarction) ([facility] [address] (2/24/2023)           Plan/Recommendations/Medical Decision Making:     CAD with NSTEMI on this admission: ASA, Statin, BB. Pre-op education and testing for CAB/AVR continue. Consents signed  Aortic Stenosis: Moderate AS with mild AI. Plan for AVR with CAB  Chronic HFpEF: BP management  HTN: Coreg  HLD: Statin  Hiatal Hernia: Noted on CXR. No current treatment  Thrombocytopenia: Platelets of 032C on 2/28. Continue ASA and continue to monitor.      Signed By: Mary Fierro NP

## 2023-03-03 ENCOUNTER — ANESTHESIA (OUTPATIENT)
Dept: CARDIOTHORACIC SURGERY | Age: 78
DRG: 219 | End: 2023-03-03
Payer: MEDICARE

## 2023-03-03 ENCOUNTER — APPOINTMENT (OUTPATIENT)
Dept: GENERAL RADIOLOGY | Age: 78
DRG: 219 | End: 2023-03-03
Attending: PHYSICIAN ASSISTANT
Payer: MEDICARE

## 2023-03-03 ENCOUNTER — HOSPITAL ENCOUNTER (OUTPATIENT)
Dept: NON INVASIVE DIAGNOSTICS | Age: 78
Discharge: HOME | End: 2023-03-03
Attending: THORACIC SURGERY (CARDIOTHORACIC VASCULAR SURGERY)

## 2023-03-03 ENCOUNTER — ANESTHESIA EVENT (OUTPATIENT)
Dept: CARDIOTHORACIC SURGERY | Age: 78
DRG: 219 | End: 2023-03-03
Payer: MEDICARE

## 2023-03-03 PROBLEM — Z95.2 S/P AVR: Status: ACTIVE | Noted: 2023-03-03

## 2023-03-03 LAB
ADMINISTERED INITIALS, ADMINIT: NORMAL
ALBUMIN SERPL-MCNC: 3.3 G/DL (ref 3.5–5)
ALBUMIN/GLOB SERPL: 1.7 (ref 1.1–2.2)
ALP SERPL-CCNC: 37 U/L (ref 45–117)
ALT SERPL-CCNC: 23 U/L (ref 12–78)
ANION GAP SERPL CALC-SCNC: 4 MMOL/L (ref 5–15)
ANION GAP SERPL CALC-SCNC: 5 MMOL/L (ref 5–15)
APTT PPP: 30.7 SEC (ref 22.1–31)
AST SERPL-CCNC: 44 U/L (ref 15–37)
BASE EXCESS BLD CALC-SCNC: 0.2 MMOL/L
BASOPHILS # BLD: 0 K/UL (ref 0–0.1)
BASOPHILS NFR BLD: 0 % (ref 0–1)
BDY SITE: ABNORMAL
BDY SITE: ABNORMAL
BILIRUB SERPL-MCNC: 0.4 MG/DL (ref 0.2–1)
BUN SERPL-MCNC: 11 MG/DL (ref 6–20)
BUN SERPL-MCNC: 12 MG/DL (ref 6–20)
BUN/CREAT SERPL: 14 (ref 12–20)
BUN/CREAT SERPL: 15 (ref 12–20)
CA-I BLD-SCNC: 1.17 MMOL/L (ref 1.12–1.32)
CALCIUM SERPL-MCNC: 7.9 MG/DL (ref 8.5–10.1)
CALCIUM SERPL-MCNC: 8.9 MG/DL (ref 8.5–10.1)
CHLORIDE SERPL-SCNC: 106 MMOL/L (ref 97–108)
CHLORIDE SERPL-SCNC: 109 MMOL/L (ref 97–108)
CO2 SERPL-SCNC: 27 MMOL/L (ref 21–32)
CO2 SERPL-SCNC: 28 MMOL/L (ref 21–32)
COHGB MFR BLD: 1.5 % (ref 1–2)
CREAT SERPL-MCNC: 0.81 MG/DL (ref 0.7–1.3)
CREAT SERPL-MCNC: 0.81 MG/DL (ref 0.7–1.3)
D50 ADMINISTERED, D50ADM: 0 ML
D50 ADMINISTERED, D50ADM: NORMAL ML
D50 ORDER, D50ORD: 0 ML
D50 ORDER, D50ORD: NORMAL ML
DIFFERENTIAL METHOD BLD: ABNORMAL
EOSINOPHIL # BLD: 0.1 K/UL (ref 0–0.4)
EOSINOPHIL NFR BLD: 1 % (ref 0–7)
ERYTHROCYTE [DISTWIDTH] IN BLOOD BY AUTOMATED COUNT: 12.5 % (ref 11.5–14.5)
FIBRINOGEN PPP-MCNC: 281 MG/DL (ref 200–475)
FIBRINOGEN PPP-MCNC: 303 MG/DL (ref 200–475)
GLOBULIN SER CALC-MCNC: 1.9 G/DL (ref 2–4)
GLUCOSE BLD STRIP.AUTO-MCNC: 104 MG/DL (ref 65–117)
GLUCOSE BLD STRIP.AUTO-MCNC: 107 MG/DL (ref 65–117)
GLUCOSE BLD STRIP.AUTO-MCNC: 112 MG/DL (ref 65–117)
GLUCOSE BLD STRIP.AUTO-MCNC: 117 MG/DL (ref 65–117)
GLUCOSE BLD STRIP.AUTO-MCNC: 86 MG/DL (ref 65–117)
GLUCOSE BLD STRIP.AUTO-MCNC: 86 MG/DL (ref 65–117)
GLUCOSE BLD STRIP.AUTO-MCNC: 91 MG/DL (ref 65–117)
GLUCOSE BLD STRIP.AUTO-MCNC: 98 MG/DL (ref 65–117)
GLUCOSE SERPL-MCNC: 82 MG/DL (ref 65–100)
GLUCOSE SERPL-MCNC: 95 MG/DL (ref 65–100)
GLUCOSE, GLC: 104 MG/DL
GLUCOSE, GLC: 107 MG/DL
GLUCOSE, GLC: 112 MG/DL
GLUCOSE, GLC: 113 MG/DL
GLUCOSE, GLC: 117 MG/DL
GLUCOSE, GLC: 139 MG/DL
GLUCOSE, GLC: 140 MG/DL
GLUCOSE, GLC: 147 MG/DL
GLUCOSE, GLC: 151 MG/DL
GLUCOSE, GLC: 159 MG/DL
GLUCOSE, GLC: 86 MG/DL
GLUCOSE, GLC: 86 MG/DL
GLUCOSE, GLC: 91 MG/DL
GLUCOSE, GLC: 98 MG/DL
GLUCOSE, GLC: NORMAL MG/DL
HCO3 BLD-SCNC: 25.9 MMOL/L (ref 22–26)
HCT VFR BLD AUTO: 20.1 % (ref 36.6–50.3)
HCT VFR BLD AUTO: 28 % (ref 36.6–50.3)
HCT VFR BLD AUTO: 35.5 % (ref 36.6–50.3)
HGB BLD-MCNC: 11.6 G/DL (ref 12.1–17)
HGB BLD-MCNC: 6.9 G/DL (ref 12.1–17)
HGB BLD-MCNC: 9.3 G/DL (ref 12.1–17)
HIGH TARGET, HITG: 130 MG/DL
HIGH TARGET, HITG: 140 MG/DL
HIGH TARGET, HITG: NORMAL MG/DL
HISTORY CHECKED?,CKHIST: NORMAL
HISTORY CHECKED?,CKHIST: NORMAL
IMM GRANULOCYTES # BLD AUTO: 0.1 K/UL (ref 0–0.04)
IMM GRANULOCYTES NFR BLD AUTO: 1 % (ref 0–0.5)
INR PPP: 1.3 (ref 0.9–1.1)
INR PPP: 1.5 (ref 0.9–1.1)
INSULIN ADMINSTERED, INSADM: 0.7 UNITS/HOUR
INSULIN ADMINSTERED, INSADM: 1 UNITS/HOUR
INSULIN ADMINSTERED, INSADM: 1.2 UNITS/HOUR
INSULIN ADMINSTERED, INSADM: 1.4 UNITS/HOUR
INSULIN ADMINSTERED, INSADM: 1.5 UNITS/HOUR
INSULIN ADMINSTERED, INSADM: 1.5 UNITS/HOUR
INSULIN ADMINSTERED, INSADM: 1.6 UNITS/HOUR
INSULIN ADMINSTERED, INSADM: 1.8 UNITS/HOUR
INSULIN ADMINSTERED, INSADM: 2.4 UNITS/HOUR
INSULIN ADMINSTERED, INSADM: 3.2 UNITS/HOUR
INSULIN ADMINSTERED, INSADM: 4 UNITS/HOUR
INSULIN ADMINSTERED, INSADM: 4.6 UNITS/HOUR
INSULIN ADMINSTERED, INSADM: 4.8 UNITS/HOUR
INSULIN ADMINSTERED, INSADM: 5.2 UNITS/HOUR
INSULIN ADMINSTERED, INSADM: NORMAL UNITS/HOUR
INSULIN ORDER, INSORD: 0.7 UNITS/HOUR
INSULIN ORDER, INSORD: 1 UNITS/HOUR
INSULIN ORDER, INSORD: 1.2 UNITS/HOUR
INSULIN ORDER, INSORD: 1.4 UNITS/HOUR
INSULIN ORDER, INSORD: 1.5 UNITS/HOUR
INSULIN ORDER, INSORD: 1.5 UNITS/HOUR
INSULIN ORDER, INSORD: 1.6 UNITS/HOUR
INSULIN ORDER, INSORD: 1.8 UNITS/HOUR
INSULIN ORDER, INSORD: 2.4 UNITS/HOUR
INSULIN ORDER, INSORD: 3.2 UNITS/HOUR
INSULIN ORDER, INSORD: 4 UNITS/HOUR
INSULIN ORDER, INSORD: 4.6 UNITS/HOUR
INSULIN ORDER, INSORD: 4.8 UNITS/HOUR
INSULIN ORDER, INSORD: 5.2 UNITS/HOUR
INSULIN ORDER, INSORD: NORMAL UNITS/HOUR
LACTATE SERPL-SCNC: 1.1 MMOL/L (ref 0.4–2)
LOW TARGET, LOT: 100 MG/DL
LOW TARGET, LOT: 95 MG/DL
LOW TARGET, LOT: NORMAL MG/DL
LYMPHOCYTES # BLD: 1.2 K/UL (ref 0.8–3.5)
LYMPHOCYTES NFR BLD: 12 % (ref 12–49)
MAGNESIUM SERPL-MCNC: 2.8 MG/DL (ref 1.6–2.4)
MCH RBC QN AUTO: 29.2 PG (ref 26–34)
MCH RBC QN AUTO: 29.7 PG (ref 26–34)
MCH RBC QN AUTO: 29.9 PG (ref 26–34)
MCHC RBC AUTO-ENTMCNC: 32.7 G/DL (ref 30–36.5)
MCHC RBC AUTO-ENTMCNC: 33.2 G/DL (ref 30–36.5)
MCHC RBC AUTO-ENTMCNC: 34.3 G/DL (ref 30–36.5)
MCV RBC AUTO: 86.6 FL (ref 80–99)
MCV RBC AUTO: 89.4 FL (ref 80–99)
MCV RBC AUTO: 90 FL (ref 80–99)
METHGB MFR BLD: 0.3 % (ref 0–1.4)
MINUTES UNTIL NEXT BG, NBG: 60 MIN
MINUTES UNTIL NEXT BG, NBG: NORMAL MIN
MONOCYTES # BLD: 0.7 K/UL (ref 0–1)
MONOCYTES NFR BLD: 7 % (ref 5–13)
MULTIPLIER, MUL: 0.03
MULTIPLIER, MUL: 0.04
MULTIPLIER, MUL: 0.05
MULTIPLIER, MUL: 0.05
MULTIPLIER, MUL: 0.06
MULTIPLIER, MUL: NORMAL
NEUTS SEG # BLD: 8 K/UL (ref 1.8–8)
NEUTS SEG NFR BLD: 79 % (ref 32–75)
NRBC # BLD: 0 K/UL (ref 0–0.01)
NRBC BLD-RTO: 0 PER 100 WBC
ORDER INITIALS, ORDINIT: NORMAL
OXYHGB MFR BLD: 56.3 % (ref 94–97)
PCO2 BLD: 45.8 MMHG (ref 35–45)
PH BLD: 7.36 (ref 7.35–7.45)
PLATELET # BLD AUTO: 140 K/UL (ref 150–400)
PLATELET # BLD AUTO: 70 K/UL (ref 150–400)
PLATELET # BLD AUTO: 79 K/UL (ref 150–400)
PMV BLD AUTO: 10.7 FL (ref 8.9–12.9)
PMV BLD AUTO: 10.9 FL (ref 8.9–12.9)
PMV BLD AUTO: 11.4 FL (ref 8.9–12.9)
PO2 BLD: 109 MMHG (ref 80–100)
POTASSIUM SERPL-SCNC: 3.8 MMOL/L (ref 3.5–5.1)
POTASSIUM SERPL-SCNC: 4.1 MMOL/L (ref 3.5–5.1)
PROT SERPL-MCNC: 5.2 G/DL (ref 6.4–8.2)
PROTHROMBIN TIME: 13.4 SEC (ref 9–11.1)
PROTHROMBIN TIME: 15 SEC (ref 9–11.1)
RBC # BLD AUTO: 2.32 M/UL (ref 4.1–5.7)
RBC # BLD AUTO: 3.11 M/UL (ref 4.1–5.7)
RBC # BLD AUTO: 3.97 M/UL (ref 4.1–5.7)
RBC MORPH BLD: ABNORMAL
SAO2 % BLD: 57 % (ref 95–99)
SAO2 % BLD: 98 % (ref 92–97)
SERVICE CMNT-IMP: NORMAL
SODIUM SERPL-SCNC: 138 MMOL/L (ref 136–145)
SODIUM SERPL-SCNC: 141 MMOL/L (ref 136–145)
SPECIMEN SITE: ABNORMAL
SPECIMEN TYPE: ABNORMAL
THERAPEUTIC RANGE,PTTT: NORMAL SECS (ref 58–77)
WBC # BLD AUTO: 10.1 K/UL (ref 4.1–11.1)
WBC # BLD AUTO: 6 K/UL (ref 4.1–11.1)
WBC # BLD AUTO: 8.1 K/UL (ref 4.1–11.1)

## 2023-03-03 PROCEDURE — P9045 ALBUMIN (HUMAN), 5%, 250 ML: HCPCS | Performed by: NURSE ANESTHETIST, CERTIFIED REGISTERED

## 2023-03-03 PROCEDURE — 77030006994: Performed by: THORACIC SURGERY (CARDIOTHORACIC VASCULAR SURGERY)

## 2023-03-03 PROCEDURE — 77030002987 HC SUT PROL J&J -B: Performed by: THORACIC SURGERY (CARDIOTHORACIC VASCULAR SURGERY)

## 2023-03-03 PROCEDURE — 02RF08Z REPLACEMENT OF AORTIC VALVE WITH ZOOPLASTIC TISSUE, OPEN APPROACH: ICD-10-PCS | Performed by: THORACIC SURGERY (CARDIOTHORACIC VASCULAR SURGERY)

## 2023-03-03 PROCEDURE — 85384 FIBRINOGEN ACTIVITY: CPT

## 2023-03-03 PROCEDURE — 83735 ASSAY OF MAGNESIUM: CPT

## 2023-03-03 PROCEDURE — 77030022521 HC CATH PERF VENT EDWD -B: Performed by: THORACIC SURGERY (CARDIOTHORACIC VASCULAR SURGERY)

## 2023-03-03 PROCEDURE — 36430 TRANSFUSION BLD/BLD COMPNT: CPT

## 2023-03-03 PROCEDURE — 5A09357 ASSISTANCE WITH RESPIRATORY VENTILATION, LESS THAN 24 CONSECUTIVE HOURS, CONTINUOUS POSITIVE AIRWAY PRESSURE: ICD-10-PCS | Performed by: THORACIC SURGERY (CARDIOTHORACIC VASCULAR SURGERY)

## 2023-03-03 PROCEDURE — 82962 GLUCOSE BLOOD TEST: CPT

## 2023-03-03 PROCEDURE — 94002 VENT MGMT INPAT INIT DAY: CPT

## 2023-03-03 PROCEDURE — 77030003010 HC SUT SURG STL J&J -B: Performed by: THORACIC SURGERY (CARDIOTHORACIC VASCULAR SURGERY)

## 2023-03-03 PROCEDURE — 5A1221Z PERFORMANCE OF CARDIAC OUTPUT, CONTINUOUS: ICD-10-PCS | Performed by: THORACIC SURGERY (CARDIOTHORACIC VASCULAR SURGERY)

## 2023-03-03 PROCEDURE — C1751 CATH, INF, PER/CENT/MIDLINE: HCPCS

## 2023-03-03 PROCEDURE — 77030010813: Performed by: THORACIC SURGERY (CARDIOTHORACIC VASCULAR SURGERY)

## 2023-03-03 PROCEDURE — 30233N1 TRANSFUSION OF NONAUTOLOGOUS RED BLOOD CELLS INTO PERIPHERAL VEIN, PERCUTANEOUS APPROACH: ICD-10-PCS | Performed by: THORACIC SURGERY (CARDIOTHORACIC VASCULAR SURGERY)

## 2023-03-03 PROCEDURE — 77030011266 HC ELECTRD BLD INSL COVD -A: Performed by: THORACIC SURGERY (CARDIOTHORACIC VASCULAR SURGERY)

## 2023-03-03 PROCEDURE — P9059 PLASMA, FRZ BETWEEN 8-24HOUR: HCPCS

## 2023-03-03 PROCEDURE — 5A1935Z RESPIRATORY VENTILATION, LESS THAN 24 CONSECUTIVE HOURS: ICD-10-PCS | Performed by: THORACIC SURGERY (CARDIOTHORACIC VASCULAR SURGERY)

## 2023-03-03 PROCEDURE — 77030005402 HC CATH RAD ART LN KT TELE -B

## 2023-03-03 PROCEDURE — 77030031403 HC IMPL MRKR GRFT CT SCAN -B: Performed by: THORACIC SURGERY (CARDIOTHORACIC VASCULAR SURGERY)

## 2023-03-03 PROCEDURE — 77030013798 HC KT TRNSDUC PRSSR EDWD -B: Performed by: THORACIC SURGERY (CARDIOTHORACIC VASCULAR SURGERY)

## 2023-03-03 PROCEDURE — 77030010605 HC BLWR MR MAL MEDT -B: Performed by: THORACIC SURGERY (CARDIOTHORACIC VASCULAR SURGERY)

## 2023-03-03 PROCEDURE — 74011000258 HC RX REV CODE- 258: Performed by: NURSE PRACTITIONER

## 2023-03-03 PROCEDURE — 33518 CABG ARTERY-VEIN TWO: CPT | Performed by: PHYSICIAN ASSISTANT

## 2023-03-03 PROCEDURE — 33508 ENDOSCOPIC VEIN HARVEST: CPT | Performed by: THORACIC SURGERY (CARDIOTHORACIC VASCULAR SURGERY)

## 2023-03-03 PROCEDURE — 74011636637 HC RX REV CODE- 636/637: Performed by: NURSE ANESTHETIST, CERTIFIED REGISTERED

## 2023-03-03 PROCEDURE — 77030037878 HC DRSG MEPILEX >48IN BORD MOLN -B

## 2023-03-03 PROCEDURE — 71045 X-RAY EXAM CHEST 1 VIEW: CPT

## 2023-03-03 PROCEDURE — 74011250636 HC RX REV CODE- 250/636: Performed by: PHYSICIAN ASSISTANT

## 2023-03-03 PROCEDURE — 77030006689 HC BLD OPHTH BVR BD -A: Performed by: THORACIC SURGERY (CARDIOTHORACIC VASCULAR SURGERY)

## 2023-03-03 PROCEDURE — 85730 THROMBOPLASTIN TIME PARTIAL: CPT

## 2023-03-03 PROCEDURE — 77030041238 HC TBNG INSUF MDII -A: Performed by: THORACIC SURGERY (CARDIOTHORACIC VASCULAR SURGERY)

## 2023-03-03 PROCEDURE — 77030041244 HC CBL PACE EXT TEMP REMG -B: Performed by: THORACIC SURGERY (CARDIOTHORACIC VASCULAR SURGERY)

## 2023-03-03 PROCEDURE — 74011000258 HC RX REV CODE- 258: Performed by: NURSE ANESTHETIST, CERTIFIED REGISTERED

## 2023-03-03 PROCEDURE — 88305 TISSUE EXAM BY PATHOLOGIST: CPT

## 2023-03-03 PROCEDURE — 06BQ4ZZ EXCISION OF LEFT SAPHENOUS VEIN, PERCUTANEOUS ENDOSCOPIC APPROACH: ICD-10-PCS | Performed by: THORACIC SURGERY (CARDIOTHORACIC VASCULAR SURGERY)

## 2023-03-03 PROCEDURE — 36415 COLL VENOUS BLD VENIPUNCTURE: CPT

## 2023-03-03 PROCEDURE — 80053 COMPREHEN METABOLIC PANEL: CPT

## 2023-03-03 PROCEDURE — 33533 CABG ARTERIAL SINGLE: CPT | Performed by: THORACIC SURGERY (CARDIOTHORACIC VASCULAR SURGERY)

## 2023-03-03 PROCEDURE — 74011000250 HC RX REV CODE- 250: Performed by: NURSE ANESTHETIST, CERTIFIED REGISTERED

## 2023-03-03 PROCEDURE — 85027 COMPLETE CBC AUTOMATED: CPT

## 2023-03-03 PROCEDURE — P9012 CRYOPRECIPITATE EACH UNIT: HCPCS

## 2023-03-03 PROCEDURE — 33405 REPLACEMENT AORTIC VALVE OPN: CPT | Performed by: PHYSICIAN ASSISTANT

## 2023-03-03 PROCEDURE — 74011250636 HC RX REV CODE- 250/636: Performed by: NURSE ANESTHETIST, CERTIFIED REGISTERED

## 2023-03-03 PROCEDURE — P9045 ALBUMIN (HUMAN), 5%, 250 ML: HCPCS | Performed by: PHYSICIAN ASSISTANT

## 2023-03-03 PROCEDURE — 77030010507 HC ADH SKN DERMBND J&J -B: Performed by: THORACIC SURGERY (CARDIOTHORACIC VASCULAR SURGERY)

## 2023-03-03 PROCEDURE — 77030002986 HC SUT PROL J&J -A: Performed by: THORACIC SURGERY (CARDIOTHORACIC VASCULAR SURGERY)

## 2023-03-03 PROCEDURE — 33518 CABG ARTERY-VEIN TWO: CPT | Performed by: THORACIC SURGERY (CARDIOTHORACIC VASCULAR SURGERY)

## 2023-03-03 PROCEDURE — 77030008684 HC TU ET CUF COVD -B: Performed by: ANESTHESIOLOGY

## 2023-03-03 PROCEDURE — 74011000250 HC RX REV CODE- 250: Performed by: INTERNAL MEDICINE

## 2023-03-03 PROCEDURE — 74011636637 HC RX REV CODE- 636/637: Performed by: THORACIC SURGERY (CARDIOTHORACIC VASCULAR SURGERY)

## 2023-03-03 PROCEDURE — 65610000003 HC RM ICU SURGICAL

## 2023-03-03 PROCEDURE — 76010000120 HC CV SURG 7 TO 7.5 HR: Performed by: THORACIC SURGERY (CARDIOTHORACIC VASCULAR SURGERY)

## 2023-03-03 PROCEDURE — 77030019702 HC WRP THER MENM -C: Performed by: THORACIC SURGERY (CARDIOTHORACIC VASCULAR SURGERY)

## 2023-03-03 PROCEDURE — P9016 RBC LEUKOCYTES REDUCED: HCPCS

## 2023-03-03 PROCEDURE — 77030037878 HC DRSG MEPILEX >48IN BORD MOLN -B: Performed by: THORACIC SURGERY (CARDIOTHORACIC VASCULAR SURGERY)

## 2023-03-03 PROCEDURE — 77030013797 HC KT TRNSDUC PRSSR EDWD -A

## 2023-03-03 PROCEDURE — 02100Z9 BYPASS CORONARY ARTERY, ONE ARTERY FROM LEFT INTERNAL MAMMARY, OPEN APPROACH: ICD-10-PCS | Performed by: THORACIC SURGERY (CARDIOTHORACIC VASCULAR SURGERY)

## 2023-03-03 PROCEDURE — 77030013313: Performed by: THORACIC SURGERY (CARDIOTHORACIC VASCULAR SURGERY)

## 2023-03-03 PROCEDURE — 76060000045 HC ANESTHESIA 7 TO 7.5 HR: Performed by: THORACIC SURGERY (CARDIOTHORACIC VASCULAR SURGERY)

## 2023-03-03 PROCEDURE — 74011250637 HC RX REV CODE- 250/637: Performed by: PHYSICIAN ASSISTANT

## 2023-03-03 PROCEDURE — 2709999900 HC NON-CHARGEABLE SUPPLY: Performed by: THORACIC SURGERY (CARDIOTHORACIC VASCULAR SURGERY)

## 2023-03-03 PROCEDURE — 33508 ENDOSCOPIC VEIN HARVEST: CPT | Performed by: PHYSICIAN ASSISTANT

## 2023-03-03 PROCEDURE — 85610 PROTHROMBIN TIME: CPT

## 2023-03-03 PROCEDURE — 76998 US GUIDE INTRAOP: CPT | Performed by: THORACIC SURGERY (CARDIOTHORACIC VASCULAR SURGERY)

## 2023-03-03 PROCEDURE — 33533 CABG ARTERIAL SINGLE: CPT | Performed by: PHYSICIAN ASSISTANT

## 2023-03-03 PROCEDURE — 77030008771 HC TU NG SALEM SUMP -A: Performed by: ANESTHESIOLOGY

## 2023-03-03 PROCEDURE — 77030040504 HC DRN WND MDII -B: Performed by: THORACIC SURGERY (CARDIOTHORACIC VASCULAR SURGERY)

## 2023-03-03 PROCEDURE — 74011250636 HC RX REV CODE- 250/636: Performed by: NURSE PRACTITIONER

## 2023-03-03 PROCEDURE — 77030034936 HC DEV MIN COR-KNOT KT LSIS -F: Performed by: THORACIC SURGERY (CARDIOTHORACIC VASCULAR SURGERY)

## 2023-03-03 PROCEDURE — 83605 ASSAY OF LACTIC ACID: CPT

## 2023-03-03 PROCEDURE — 77030002933 HC SUT MCRYL J&J -A: Performed by: THORACIC SURGERY (CARDIOTHORACIC VASCULAR SURGERY)

## 2023-03-03 PROCEDURE — 82375 ASSAY CARBOXYHB QUANT: CPT

## 2023-03-03 PROCEDURE — 74011000250 HC RX REV CODE- 250: Performed by: ANESTHESIOLOGY

## 2023-03-03 PROCEDURE — 77030021678 HC GLIDESCP STAT DISP VERT -B: Performed by: ANESTHESIOLOGY

## 2023-03-03 PROCEDURE — 85025 COMPLETE CBC W/AUTO DIFF WBC: CPT

## 2023-03-03 PROCEDURE — 77030022199 HC SYS HARV VESL GTNG -G1: Performed by: THORACIC SURGERY (CARDIOTHORACIC VASCULAR SURGERY)

## 2023-03-03 PROCEDURE — 77030010819: Performed by: THORACIC SURGERY (CARDIOTHORACIC VASCULAR SURGERY)

## 2023-03-03 PROCEDURE — 77030033069 HC RLD QLC SGL COR-KNOT LSIS -B: Performed by: THORACIC SURGERY (CARDIOTHORACIC VASCULAR SURGERY)

## 2023-03-03 PROCEDURE — 77030041523 HC SEALNT FIBRN VITASEAL J&J -E: Performed by: THORACIC SURGERY (CARDIOTHORACIC VASCULAR SURGERY)

## 2023-03-03 PROCEDURE — P9073 PLATELETS PHERESIS PATH REDU: HCPCS

## 2023-03-03 PROCEDURE — 33405 REPLACEMENT AORTIC VALVE OPN: CPT | Performed by: THORACIC SURGERY (CARDIOTHORACIC VASCULAR SURGERY)

## 2023-03-03 PROCEDURE — 74011000250 HC RX REV CODE- 250: Performed by: PHYSICIAN ASSISTANT

## 2023-03-03 PROCEDURE — 74011000250 HC RX REV CODE- 250: Performed by: NURSE PRACTITIONER

## 2023-03-03 PROCEDURE — 77030026438 HC STYL ET INTUB CARD -A: Performed by: ANESTHESIOLOGY

## 2023-03-03 PROCEDURE — 74011250636 HC RX REV CODE- 250/636: Performed by: THORACIC SURGERY (CARDIOTHORACIC VASCULAR SURGERY)

## 2023-03-03 PROCEDURE — 77030012390 HC DRN CHST BTL GTNG -B: Performed by: THORACIC SURGERY (CARDIOTHORACIC VASCULAR SURGERY)

## 2023-03-03 PROCEDURE — 77030002912 HC SUT ETHBND J&J -A: Performed by: THORACIC SURGERY (CARDIOTHORACIC VASCULAR SURGERY)

## 2023-03-03 PROCEDURE — 77030041469 HC VLV AORT INSPIRIS EDWD -K4: Performed by: THORACIC SURGERY (CARDIOTHORACIC VASCULAR SURGERY)

## 2023-03-03 PROCEDURE — 82803 BLOOD GASES ANY COMBINATION: CPT

## 2023-03-03 PROCEDURE — 021109W BYPASS CORONARY ARTERY, TWO ARTERIES FROM AORTA WITH AUTOLOGOUS VENOUS TISSUE, OPEN APPROACH: ICD-10-PCS | Performed by: THORACIC SURGERY (CARDIOTHORACIC VASCULAR SURGERY)

## 2023-03-03 PROCEDURE — 77030010797: Performed by: THORACIC SURGERY (CARDIOTHORACIC VASCULAR SURGERY)

## 2023-03-03 PROCEDURE — 77030010800: Performed by: THORACIC SURGERY (CARDIOTHORACIC VASCULAR SURGERY)

## 2023-03-03 PROCEDURE — 93355 ECHO TRANSESOPHAGEAL (TEE): CPT | Performed by: THORACIC SURGERY (CARDIOTHORACIC VASCULAR SURGERY)

## 2023-03-03 PROCEDURE — 74011250636 HC RX REV CODE- 250/636: Performed by: ANESTHESIOLOGY

## 2023-03-03 PROCEDURE — 77030016441 HC APPL CLP LIG1 J&J -B: Performed by: THORACIC SURGERY (CARDIOTHORACIC VASCULAR SURGERY)

## 2023-03-03 PROCEDURE — 77030020061 HC IV BLD WRMR ADMIN SET 3M -B: Performed by: ANESTHESIOLOGY

## 2023-03-03 DEVICE — IMPLANTABLE DEVICE: Type: IMPLANTABLE DEVICE | Site: AORTIC VALVE | Status: FUNCTIONAL

## 2023-03-03 RX ORDER — SODIUM CHLORIDE 9 MG/ML
250 INJECTION, SOLUTION INTRAVENOUS AS NEEDED
Status: DISCONTINUED | OUTPATIENT
Start: 2023-03-03 | End: 2023-03-06 | Stop reason: ALTCHOICE

## 2023-03-03 RX ORDER — LANOLIN ALCOHOL/MO/W.PET/CERES
400 CREAM (GRAM) TOPICAL 2 TIMES DAILY
Status: DISCONTINUED | OUTPATIENT
Start: 2023-03-04 | End: 2023-03-09 | Stop reason: HOSPADM

## 2023-03-03 RX ORDER — SODIUM CHLORIDE 9 MG/ML
25 INJECTION, SOLUTION INTRAVENOUS CONTINUOUS
Status: DISCONTINUED | OUTPATIENT
Start: 2023-03-03 | End: 2023-03-03 | Stop reason: HOSPADM

## 2023-03-03 RX ORDER — FENTANYL CITRATE 50 UG/ML
25 INJECTION, SOLUTION INTRAMUSCULAR; INTRAVENOUS
Status: DISCONTINUED | OUTPATIENT
Start: 2023-03-03 | End: 2023-03-03 | Stop reason: HOSPADM

## 2023-03-03 RX ORDER — ROPIVACAINE HYDROCHLORIDE 5 MG/ML
INJECTION, SOLUTION EPIDURAL; INFILTRATION; PERINEURAL AS NEEDED
Status: DISCONTINUED | OUTPATIENT
Start: 2023-03-03 | End: 2023-03-03 | Stop reason: HOSPADM

## 2023-03-03 RX ORDER — HEPARIN SODIUM 1000 [USP'U]/ML
INJECTION, SOLUTION INTRAVENOUS; SUBCUTANEOUS AS NEEDED
Status: DISCONTINUED | OUTPATIENT
Start: 2023-03-03 | End: 2023-03-03 | Stop reason: HOSPADM

## 2023-03-03 RX ORDER — MAGNESIUM SULFATE HEPTAHYDRATE 40 MG/ML
INJECTION, SOLUTION INTRAVENOUS AS NEEDED
Status: DISCONTINUED | OUTPATIENT
Start: 2023-03-03 | End: 2023-03-03 | Stop reason: HOSPADM

## 2023-03-03 RX ORDER — PROPOFOL 10 MG/ML
INJECTION, EMULSION INTRAVENOUS AS NEEDED
Status: DISCONTINUED | OUTPATIENT
Start: 2023-03-03 | End: 2023-03-03 | Stop reason: HOSPADM

## 2023-03-03 RX ORDER — SUCCINYLCHOLINE CHLORIDE 20 MG/ML INJECTION SOLUTION
SOLUTION AS NEEDED
Status: DISCONTINUED | OUTPATIENT
Start: 2023-03-03 | End: 2023-03-03 | Stop reason: HOSPADM

## 2023-03-03 RX ORDER — INSULIN LISPRO 100 [IU]/ML
INJECTION, SOLUTION INTRAVENOUS; SUBCUTANEOUS
Status: DISCONTINUED | OUTPATIENT
Start: 2023-03-03 | End: 2023-03-03

## 2023-03-03 RX ORDER — SODIUM CHLORIDE 9 MG/ML
3 INJECTION, SOLUTION INTRAVENOUS CONTINUOUS
Status: DISCONTINUED | OUTPATIENT
Start: 2023-03-03 | End: 2023-03-09 | Stop reason: HOSPADM

## 2023-03-03 RX ORDER — POLYETHYLENE GLYCOL 3350 17 G/17G
17 POWDER, FOR SOLUTION ORAL DAILY
Status: DISCONTINUED | OUTPATIENT
Start: 2023-03-04 | End: 2023-03-09 | Stop reason: HOSPADM

## 2023-03-03 RX ORDER — SODIUM CHLORIDE, SODIUM LACTATE, POTASSIUM CHLORIDE, CALCIUM CHLORIDE 600; 310; 30; 20 MG/100ML; MG/100ML; MG/100ML; MG/100ML
INJECTION, SOLUTION INTRAVENOUS
Status: DISCONTINUED | OUTPATIENT
Start: 2023-03-03 | End: 2023-03-03 | Stop reason: HOSPADM

## 2023-03-03 RX ORDER — SODIUM CHLORIDE, SODIUM LACTATE, POTASSIUM CHLORIDE, CALCIUM CHLORIDE 600; 310; 30; 20 MG/100ML; MG/100ML; MG/100ML; MG/100ML
25 INJECTION, SOLUTION INTRAVENOUS CONTINUOUS
Status: DISCONTINUED | OUTPATIENT
Start: 2023-03-03 | End: 2023-03-03 | Stop reason: HOSPADM

## 2023-03-03 RX ORDER — DESMOPRESSIN ACETATE 4 UG/ML
INJECTION, SOLUTION INTRAVENOUS; SUBCUTANEOUS AS NEEDED
Status: DISCONTINUED | OUTPATIENT
Start: 2023-03-03 | End: 2023-03-03 | Stop reason: HOSPADM

## 2023-03-03 RX ORDER — INSULIN GLARGINE 100 [IU]/ML
1-50 INJECTION, SOLUTION SUBCUTANEOUS
Status: DISCONTINUED | OUTPATIENT
Start: 2023-03-03 | End: 2023-03-09 | Stop reason: HOSPADM

## 2023-03-03 RX ORDER — INSULIN GLARGINE 100 [IU]/ML
1-50 INJECTION, SOLUTION SUBCUTANEOUS
Status: ACTIVE | OUTPATIENT
Start: 2023-03-03 | End: 2023-03-04

## 2023-03-03 RX ORDER — OXYCODONE HYDROCHLORIDE 5 MG/1
5 TABLET ORAL
Status: DISCONTINUED | OUTPATIENT
Start: 2023-03-03 | End: 2023-03-09 | Stop reason: HOSPADM

## 2023-03-03 RX ORDER — ALBUMIN HUMAN 50 G/1000ML
SOLUTION INTRAVENOUS AS NEEDED
Status: DISCONTINUED | OUTPATIENT
Start: 2023-03-03 | End: 2023-03-03 | Stop reason: HOSPADM

## 2023-03-03 RX ORDER — MORPHINE SULFATE 4 MG/ML
4 INJECTION INTRAVENOUS
Status: DISCONTINUED | OUTPATIENT
Start: 2023-03-03 | End: 2023-03-03

## 2023-03-03 RX ORDER — SODIUM CHLORIDE 0.9 % (FLUSH) 0.9 %
5-40 SYRINGE (ML) INJECTION EVERY 8 HOURS
Status: DISCONTINUED | OUTPATIENT
Start: 2023-03-03 | End: 2023-03-09 | Stop reason: HOSPADM

## 2023-03-03 RX ORDER — ROPIVACAINE HYDROCHLORIDE 5 MG/ML
30 INJECTION, SOLUTION EPIDURAL; INFILTRATION; PERINEURAL AS NEEDED
Status: DISCONTINUED | OUTPATIENT
Start: 2023-03-03 | End: 2023-03-03 | Stop reason: HOSPADM

## 2023-03-03 RX ORDER — SODIUM CHLORIDE 0.9 % (FLUSH) 0.9 %
5-40 SYRINGE (ML) INJECTION AS NEEDED
Status: DISCONTINUED | OUTPATIENT
Start: 2023-03-03 | End: 2023-03-03 | Stop reason: HOSPADM

## 2023-03-03 RX ORDER — POTASSIUM CHLORIDE 29.8 MG/ML
20 INJECTION INTRAVENOUS ONCE
Status: COMPLETED | OUTPATIENT
Start: 2023-03-03 | End: 2023-03-03

## 2023-03-03 RX ORDER — CHLORHEXIDINE GLUCONATE 1.2 MG/ML
10 RINSE ORAL EVERY 12 HOURS
Status: DISCONTINUED | OUTPATIENT
Start: 2023-03-03 | End: 2023-03-09 | Stop reason: HOSPADM

## 2023-03-03 RX ORDER — SODIUM CHLORIDE 0.9 % (FLUSH) 0.9 %
5-40 SYRINGE (ML) INJECTION AS NEEDED
Status: DISCONTINUED | OUTPATIENT
Start: 2023-03-03 | End: 2023-03-09 | Stop reason: HOSPADM

## 2023-03-03 RX ORDER — AMOXICILLIN 250 MG
1 CAPSULE ORAL 2 TIMES DAILY
Status: DISCONTINUED | OUTPATIENT
Start: 2023-03-04 | End: 2023-03-09 | Stop reason: HOSPADM

## 2023-03-03 RX ORDER — VECURONIUM BROMIDE FOR INJECTION 1 MG/ML
INJECTION, POWDER, LYOPHILIZED, FOR SOLUTION INTRAVENOUS AS NEEDED
Status: DISCONTINUED | OUTPATIENT
Start: 2023-03-03 | End: 2023-03-03 | Stop reason: HOSPADM

## 2023-03-03 RX ORDER — DIPHENHYDRAMINE HYDROCHLORIDE 50 MG/ML
12.5 INJECTION, SOLUTION INTRAMUSCULAR; INTRAVENOUS AS NEEDED
Status: DISCONTINUED | OUTPATIENT
Start: 2023-03-03 | End: 2023-03-03

## 2023-03-03 RX ORDER — AMIODARONE HYDROCHLORIDE 200 MG/1
400 TABLET ORAL EVERY 12 HOURS
Status: DISCONTINUED | OUTPATIENT
Start: 2023-03-04 | End: 2023-03-03

## 2023-03-03 RX ORDER — IBUPROFEN 200 MG
4 TABLET ORAL AS NEEDED
Status: DISCONTINUED | OUTPATIENT
Start: 2023-03-03 | End: 2023-03-03

## 2023-03-03 RX ORDER — PROTAMINE SULFATE 10 MG/ML
INJECTION, SOLUTION INTRAVENOUS AS NEEDED
Status: DISCONTINUED | OUTPATIENT
Start: 2023-03-03 | End: 2023-03-03 | Stop reason: HOSPADM

## 2023-03-03 RX ORDER — ONDANSETRON 2 MG/ML
4 INJECTION INTRAMUSCULAR; INTRAVENOUS
Status: DISCONTINUED | OUTPATIENT
Start: 2023-03-03 | End: 2023-03-09 | Stop reason: HOSPADM

## 2023-03-03 RX ORDER — FACIAL-BODY WIPES
10 EACH TOPICAL DAILY PRN
Status: DISCONTINUED | OUTPATIENT
Start: 2023-03-03 | End: 2023-03-09 | Stop reason: HOSPADM

## 2023-03-03 RX ORDER — GUAIFENESIN 100 MG/5ML
81 LIQUID (ML) ORAL DAILY
Status: DISCONTINUED | OUTPATIENT
Start: 2023-03-04 | End: 2023-03-09 | Stop reason: HOSPADM

## 2023-03-03 RX ORDER — MIDAZOLAM HYDROCHLORIDE 1 MG/ML
1 INJECTION, SOLUTION INTRAMUSCULAR; INTRAVENOUS
Status: DISCONTINUED | OUTPATIENT
Start: 2023-03-03 | End: 2023-03-09 | Stop reason: HOSPADM

## 2023-03-03 RX ORDER — MUPIROCIN 20 MG/G
OINTMENT TOPICAL 2 TIMES DAILY
Status: COMPLETED | OUTPATIENT
Start: 2023-03-03 | End: 2023-03-08

## 2023-03-03 RX ORDER — DEXMEDETOMIDINE HYDROCHLORIDE 4 UG/ML
.1-1.5 INJECTION, SOLUTION INTRAVENOUS
Status: DISCONTINUED | OUTPATIENT
Start: 2023-03-03 | End: 2023-03-06

## 2023-03-03 RX ORDER — INSULIN LISPRO 100 [IU]/ML
INJECTION, SOLUTION INTRAVENOUS; SUBCUTANEOUS
Status: DISCONTINUED | OUTPATIENT
Start: 2023-03-03 | End: 2023-03-05

## 2023-03-03 RX ORDER — CEFAZOLIN SODIUM 1 G/3ML
INJECTION, POWDER, FOR SOLUTION INTRAMUSCULAR; INTRAVENOUS AS NEEDED
Status: DISCONTINUED | OUTPATIENT
Start: 2023-03-03 | End: 2023-03-03 | Stop reason: HOSPADM

## 2023-03-03 RX ORDER — MORPHINE SULFATE 2 MG/ML
2 INJECTION, SOLUTION INTRAMUSCULAR; INTRAVENOUS
Status: DISCONTINUED | OUTPATIENT
Start: 2023-03-03 | End: 2023-03-03 | Stop reason: HOSPADM

## 2023-03-03 RX ORDER — FAMOTIDINE 20 MG/1
20 TABLET, FILM COATED ORAL EVERY 12 HOURS
Status: DISCONTINUED | OUTPATIENT
Start: 2023-03-04 | End: 2023-03-06

## 2023-03-03 RX ORDER — NALOXONE HYDROCHLORIDE 0.4 MG/ML
0.4 INJECTION, SOLUTION INTRAMUSCULAR; INTRAVENOUS; SUBCUTANEOUS AS NEEDED
Status: DISCONTINUED | OUTPATIENT
Start: 2023-03-03 | End: 2023-03-09 | Stop reason: HOSPADM

## 2023-03-03 RX ORDER — OXYCODONE HYDROCHLORIDE 5 MG/1
5 TABLET ORAL AS NEEDED
Status: DISCONTINUED | OUTPATIENT
Start: 2023-03-03 | End: 2023-03-03 | Stop reason: SDUPTHER

## 2023-03-03 RX ORDER — ACETAMINOPHEN 325 MG/1
975 TABLET ORAL EVERY 6 HOURS
Status: DISPENSED | OUTPATIENT
Start: 2023-03-03 | End: 2023-03-05

## 2023-03-03 RX ORDER — ALBUTEROL SULFATE 0.83 MG/ML
2.5 SOLUTION RESPIRATORY (INHALATION)
Status: DISCONTINUED | OUTPATIENT
Start: 2023-03-03 | End: 2023-03-09 | Stop reason: HOSPADM

## 2023-03-03 RX ORDER — SODIUM CHLORIDE, SODIUM LACTATE, POTASSIUM CHLORIDE, CALCIUM CHLORIDE 600; 310; 30; 20 MG/100ML; MG/100ML; MG/100ML; MG/100ML
100 INJECTION, SOLUTION INTRAVENOUS CONTINUOUS
Status: DISCONTINUED | OUTPATIENT
Start: 2023-03-03 | End: 2023-03-05

## 2023-03-03 RX ORDER — PHENYLEPHRINE HCL IN 0.9% NACL 0.4MG/10ML
SYRINGE (ML) INTRAVENOUS AS NEEDED
Status: DISCONTINUED | OUTPATIENT
Start: 2023-03-03 | End: 2023-03-03 | Stop reason: HOSPADM

## 2023-03-03 RX ORDER — MAGNESIUM SULFATE 1 G/100ML
1 INJECTION INTRAVENOUS AS NEEDED
Status: DISCONTINUED | OUTPATIENT
Start: 2023-03-03 | End: 2023-03-09 | Stop reason: HOSPADM

## 2023-03-03 RX ORDER — FENTANYL CITRATE 50 UG/ML
INJECTION, SOLUTION INTRAMUSCULAR; INTRAVENOUS AS NEEDED
Status: DISCONTINUED | OUTPATIENT
Start: 2023-03-03 | End: 2023-03-03 | Stop reason: HOSPADM

## 2023-03-03 RX ORDER — INSULIN LISPRO 100 [IU]/ML
INJECTION, SOLUTION INTRAVENOUS; SUBCUTANEOUS
Status: DISCONTINUED | OUTPATIENT
Start: 2023-03-03 | End: 2023-03-06

## 2023-03-03 RX ORDER — MORPHINE SULFATE 4 MG/ML
INJECTION INTRAVENOUS AS NEEDED
Status: DISCONTINUED | OUTPATIENT
Start: 2023-03-03 | End: 2023-03-03 | Stop reason: HOSPADM

## 2023-03-03 RX ORDER — ONDANSETRON 2 MG/ML
4 INJECTION INTRAMUSCULAR; INTRAVENOUS AS NEEDED
Status: DISCONTINUED | OUTPATIENT
Start: 2023-03-03 | End: 2023-03-03

## 2023-03-03 RX ORDER — ACETAMINOPHEN 325 MG/1
650 TABLET ORAL ONCE
Status: DISCONTINUED | OUTPATIENT
Start: 2023-03-03 | End: 2023-03-03 | Stop reason: HOSPADM

## 2023-03-03 RX ORDER — BACITRACIN 500 UNIT/G
1 PACKET (EA) TOPICAL AS NEEDED
Status: DISCONTINUED | OUTPATIENT
Start: 2023-03-03 | End: 2023-03-09 | Stop reason: HOSPADM

## 2023-03-03 RX ORDER — MIDAZOLAM HYDROCHLORIDE 1 MG/ML
1 INJECTION, SOLUTION INTRAMUSCULAR; INTRAVENOUS AS NEEDED
Status: DISCONTINUED | OUTPATIENT
Start: 2023-03-03 | End: 2023-03-03 | Stop reason: HOSPADM

## 2023-03-03 RX ORDER — IBUPROFEN 200 MG
4 TABLET ORAL AS NEEDED
Status: DISCONTINUED | OUTPATIENT
Start: 2023-03-03 | End: 2023-03-09 | Stop reason: HOSPADM

## 2023-03-03 RX ORDER — HYDROMORPHONE HYDROCHLORIDE 1 MG/ML
0.2 INJECTION, SOLUTION INTRAMUSCULAR; INTRAVENOUS; SUBCUTANEOUS
Status: DISCONTINUED | OUTPATIENT
Start: 2023-03-03 | End: 2023-03-03 | Stop reason: HOSPADM

## 2023-03-03 RX ORDER — SODIUM CHLORIDE 9 MG/ML
INJECTION, SOLUTION INTRAVENOUS
Status: DISCONTINUED | OUTPATIENT
Start: 2023-03-03 | End: 2023-03-03 | Stop reason: HOSPADM

## 2023-03-03 RX ORDER — SODIUM CHLORIDE 450 MG/100ML
10 INJECTION, SOLUTION INTRAVENOUS CONTINUOUS
Status: DISCONTINUED | OUTPATIENT
Start: 2023-03-03 | End: 2023-03-09 | Stop reason: HOSPADM

## 2023-03-03 RX ORDER — FENTANYL CITRATE 50 UG/ML
50 INJECTION, SOLUTION INTRAMUSCULAR; INTRAVENOUS AS NEEDED
Status: COMPLETED | OUTPATIENT
Start: 2023-03-03 | End: 2023-03-03

## 2023-03-03 RX ORDER — DOBUTAMINE HYDROCHLORIDE 200 MG/100ML
0-10 INJECTION INTRAVENOUS
Status: DISCONTINUED | OUTPATIENT
Start: 2023-03-03 | End: 2023-03-06

## 2023-03-03 RX ORDER — ALBUMIN HUMAN 50 G/1000ML
12.5 SOLUTION INTRAVENOUS
Status: COMPLETED | OUTPATIENT
Start: 2023-03-03 | End: 2023-03-04

## 2023-03-03 RX ORDER — PAPAVERINE HYDROCHLORIDE 30 MG/ML
INJECTION INTRAMUSCULAR; INTRAVENOUS AS NEEDED
Status: DISCONTINUED | OUTPATIENT
Start: 2023-03-03 | End: 2023-03-03 | Stop reason: HOSPADM

## 2023-03-03 RX ORDER — MIDAZOLAM HYDROCHLORIDE 1 MG/ML
0.5 INJECTION, SOLUTION INTRAMUSCULAR; INTRAVENOUS
Status: DISCONTINUED | OUTPATIENT
Start: 2023-03-03 | End: 2023-03-03 | Stop reason: HOSPADM

## 2023-03-03 RX ORDER — SODIUM CHLORIDE 0.9 % (FLUSH) 0.9 %
5-40 SYRINGE (ML) INJECTION EVERY 8 HOURS
Status: DISCONTINUED | OUTPATIENT
Start: 2023-03-03 | End: 2023-03-03 | Stop reason: HOSPADM

## 2023-03-03 RX ORDER — OXYCODONE HYDROCHLORIDE 5 MG/1
10 TABLET ORAL
Status: DISCONTINUED | OUTPATIENT
Start: 2023-03-03 | End: 2023-03-09 | Stop reason: HOSPADM

## 2023-03-03 RX ORDER — LIDOCAINE HYDROCHLORIDE 10 MG/ML
0.1 INJECTION, SOLUTION EPIDURAL; INFILTRATION; INTRACAUDAL; PERINEURAL AS NEEDED
Status: DISCONTINUED | OUTPATIENT
Start: 2023-03-03 | End: 2023-03-03 | Stop reason: HOSPADM

## 2023-03-03 RX ORDER — NOREPINEPHRINE BITARTRATE/D5W 8 MG/250ML
.5-16 PLASTIC BAG, INJECTION (ML) INTRAVENOUS
Status: DISCONTINUED | OUTPATIENT
Start: 2023-03-03 | End: 2023-03-06

## 2023-03-03 RX ADMIN — DEXMEDETOMIDINE HYDROCHLORIDE 1.5 MCG/KG/HR: 4 INJECTION, SOLUTION INTRAVENOUS at 22:50

## 2023-03-03 RX ADMIN — VECURONIUM BROMIDE 6 MG: 10 INJECTION, POWDER, LYOPHILIZED, FOR SOLUTION INTRAVENOUS at 12:26

## 2023-03-03 RX ADMIN — MUPIROCIN: 20 OINTMENT TOPICAL at 17:42

## 2023-03-03 RX ADMIN — SODIUM CHLORIDE: 900 INJECTION, SOLUTION INTRAVENOUS at 07:30

## 2023-03-03 RX ADMIN — CEFAZOLIN 2 G: 1 INJECTION, POWDER, FOR SOLUTION INTRAMUSCULAR; INTRAVENOUS at 20:07

## 2023-03-03 RX ADMIN — ALBUMIN (HUMAN) 250 ML: 12.5 INJECTION, SOLUTION INTRAVENOUS at 14:48

## 2023-03-03 RX ADMIN — SODIUM CHLORIDE, POTASSIUM CHLORIDE, SODIUM LACTATE AND CALCIUM CHLORIDE: 600; 310; 30; 20 INJECTION, SOLUTION INTRAVENOUS at 07:30

## 2023-03-03 RX ADMIN — SODIUM CHLORIDE 25 MCG/MIN: 9 INJECTION, SOLUTION INTRAVENOUS at 07:46

## 2023-03-03 RX ADMIN — FENTANYL CITRATE 50 MCG: 50 INJECTION, SOLUTION INTRAMUSCULAR; INTRAVENOUS at 09:41

## 2023-03-03 RX ADMIN — FENTANYL CITRATE 50 MCG: 50 INJECTION, SOLUTION INTRAMUSCULAR; INTRAVENOUS at 07:29

## 2023-03-03 RX ADMIN — EPINEPHRINE 2 MCG/MIN: 1 INJECTION INTRAMUSCULAR; INTRAVENOUS; SUBCUTANEOUS at 18:34

## 2023-03-03 RX ADMIN — CHLORHEXIDINE GLUCONATE 10 ML: 1.2 RINSE ORAL at 22:10

## 2023-03-03 RX ADMIN — Medication 2.4 UNITS/HR: at 10:52

## 2023-03-03 RX ADMIN — CALCIUM CHLORIDE 1 G: 100 INJECTION INTRAVENOUS; INTRAVENTRICULAR at 16:37

## 2023-03-03 RX ADMIN — PROTAMINE SULFATE 450 MG: 10 INJECTION, SOLUTION INTRAVENOUS at 13:30

## 2023-03-03 RX ADMIN — HEPARIN SODIUM 45000 UNITS: 1000 INJECTION, SOLUTION INTRAVENOUS; SUBCUTANEOUS at 09:04

## 2023-03-03 RX ADMIN — DEXTROSE MONOHYDRATE 4 MCG/MIN: 50 INJECTION, SOLUTION INTRAVENOUS at 16:24

## 2023-03-03 RX ADMIN — SODIUM CHLORIDE, POTASSIUM CHLORIDE, SODIUM LACTATE AND CALCIUM CHLORIDE 25 ML/HR: 600; 310; 30; 20 INJECTION, SOLUTION INTRAVENOUS at 06:34

## 2023-03-03 RX ADMIN — DEXMEDETOMIDINE HYDROCHLORIDE 1 MCG/KG/HR: 4 INJECTION, SOLUTION INTRAVENOUS at 17:28

## 2023-03-03 RX ADMIN — WATER 2 G: 1 INJECTION INTRAMUSCULAR; INTRAVENOUS; SUBCUTANEOUS at 07:55

## 2023-03-03 RX ADMIN — SODIUM CHLORIDE, PRESERVATIVE FREE 10 ML: 5 INJECTION INTRAVENOUS at 05:40

## 2023-03-03 RX ADMIN — MIDAZOLAM 2 MG: 1 INJECTION INTRAMUSCULAR; INTRAVENOUS at 07:12

## 2023-03-03 RX ADMIN — PROPOFOL 100 MG: 10 INJECTION, EMULSION INTRAVENOUS at 07:41

## 2023-03-03 RX ADMIN — VECURONIUM BROMIDE 12 MG: 10 INJECTION, POWDER, LYOPHILIZED, FOR SOLUTION INTRAVENOUS at 07:46

## 2023-03-03 RX ADMIN — FENTANYL CITRATE 50 MCG: 50 INJECTION, SOLUTION INTRAMUSCULAR; INTRAVENOUS at 09:11

## 2023-03-03 RX ADMIN — ALBUMIN (HUMAN) 250 ML: 12.5 INJECTION, SOLUTION INTRAVENOUS at 14:23

## 2023-03-03 RX ADMIN — ALBUMIN (HUMAN) 250 ML: 12.5 INJECTION, SOLUTION INTRAVENOUS at 14:03

## 2023-03-03 RX ADMIN — VECURONIUM BROMIDE 7 MG: 10 INJECTION, POWDER, LYOPHILIZED, FOR SOLUTION INTRAVENOUS at 09:00

## 2023-03-03 RX ADMIN — Medication 120 MCG: at 07:41

## 2023-03-03 RX ADMIN — FENTANYL CITRATE 100 MCG: 50 INJECTION, SOLUTION INTRAMUSCULAR; INTRAVENOUS at 08:07

## 2023-03-03 RX ADMIN — MIDAZOLAM 1 MG: 1 INJECTION INTRAMUSCULAR; INTRAVENOUS at 07:18

## 2023-03-03 RX ADMIN — Medication 80 MCG: at 07:46

## 2023-03-03 RX ADMIN — AMINOCAPROIC ACID 10 G/HR: 250 INJECTION, SOLUTION INTRAVENOUS at 07:41

## 2023-03-03 RX ADMIN — SODIUM CHLORIDE, PRESERVATIVE FREE 10 ML: 5 INJECTION INTRAVENOUS at 15:58

## 2023-03-03 RX ADMIN — SODIUM CHLORIDE, PRESERVATIVE FREE 10 ML: 5 INJECTION INTRAVENOUS at 15:57

## 2023-03-03 RX ADMIN — SODIUM CHLORIDE, PRESERVATIVE FREE 10 ML: 5 INJECTION INTRAVENOUS at 22:34

## 2023-03-03 RX ADMIN — FENTANYL CITRATE 100 MCG: 50 INJECTION, SOLUTION INTRAMUSCULAR; INTRAVENOUS at 08:17

## 2023-03-03 RX ADMIN — WATER 2 G: 1 INJECTION INTRAMUSCULAR; INTRAVENOUS; SUBCUTANEOUS at 11:05

## 2023-03-03 RX ADMIN — SODIUM CHLORIDE 40 MCG: 9 INJECTION, SOLUTION INTRAVENOUS at 14:07

## 2023-03-03 RX ADMIN — SODIUM CHLORIDE 60 MCG: 9 INJECTION, SOLUTION INTRAVENOUS at 14:15

## 2023-03-03 RX ADMIN — DEXMEDETOMIDINE HYDROCHLORIDE 0.4 MCG/KG/HR: 100 INJECTION, SOLUTION, CONCENTRATE INTRAVENOUS at 12:51

## 2023-03-03 RX ADMIN — MORPHINE SULFATE 4 MG: 4 INJECTION, SOLUTION INTRAMUSCULAR; INTRAVENOUS at 15:39

## 2023-03-03 RX ADMIN — POTASSIUM CHLORIDE 20 MEQ: 29.8 INJECTION, SOLUTION INTRAVENOUS at 18:30

## 2023-03-03 RX ADMIN — MAGNESIUM SULFATE 2 G: 2 INJECTION INTRAVENOUS at 11:35

## 2023-03-03 RX ADMIN — FENTANYL CITRATE 50 MCG: 50 INJECTION, SOLUTION INTRAMUSCULAR; INTRAVENOUS at 07:15

## 2023-03-03 RX ADMIN — FENTANYL CITRATE 200 MCG: 50 INJECTION, SOLUTION INTRAMUSCULAR; INTRAVENOUS at 07:41

## 2023-03-03 RX ADMIN — DEXMEDETOMIDINE HYDROCHLORIDE 1.5 MCG/KG/HR: 4 INJECTION, SOLUTION INTRAVENOUS at 20:29

## 2023-03-03 RX ADMIN — VECURONIUM BROMIDE 4 MG: 10 INJECTION, POWDER, LYOPHILIZED, FOR SOLUTION INTRAVENOUS at 10:01

## 2023-03-03 RX ADMIN — FENTANYL CITRATE 50 MCG/HR: 0.05 INJECTION, SOLUTION INTRAMUSCULAR; INTRAVENOUS at 16:38

## 2023-03-03 RX ADMIN — ALBUMIN (HUMAN) 12.5 G: 12.5 INJECTION, SOLUTION INTRAVENOUS at 15:49

## 2023-03-03 RX ADMIN — PROTAMINE SULFATE 50 MG: 10 INJECTION, SOLUTION INTRAVENOUS at 13:45

## 2023-03-03 RX ADMIN — MORPHINE SULFATE 4 MG: 4 INJECTION INTRAVENOUS at 14:13

## 2023-03-03 RX ADMIN — FAMOTIDINE 20 MG: 10 INJECTION, SOLUTION INTRAVENOUS at 20:14

## 2023-03-03 RX ADMIN — SODIUM CHLORIDE, POTASSIUM CHLORIDE, SODIUM LACTATE AND CALCIUM CHLORIDE 100 ML/HR: 600; 310; 30; 20 INJECTION, SOLUTION INTRAVENOUS at 15:59

## 2023-03-03 RX ADMIN — SODIUM CHLORIDE, SODIUM LACTATE, POTASSIUM CHLORIDE, CALCIUM CHLORIDE: 600; 310; 30; 20 INJECTION, SOLUTION INTRAVENOUS at 07:30

## 2023-03-03 RX ADMIN — WATER 2 G: 1 INJECTION INTRAMUSCULAR; INTRAVENOUS; SUBCUTANEOUS at 13:58

## 2023-03-03 RX ADMIN — Medication 180 MG: at 07:41

## 2023-03-03 RX ADMIN — VECURONIUM BROMIDE 3 MG: 10 INJECTION, POWDER, LYOPHILIZED, FOR SOLUTION INTRAVENOUS at 13:44

## 2023-03-03 RX ADMIN — VECURONIUM BROMIDE 1 MG: 10 INJECTION, POWDER, LYOPHILIZED, FOR SOLUTION INTRAVENOUS at 07:41

## 2023-03-03 RX ADMIN — DESMOPRESSIN ACETATE 32 MCG: 4 INJECTION, SOLUTION INTRAVENOUS; SUBCUTANEOUS at 13:40

## 2023-03-03 NOTE — PROGRESS NOTES
Occupational Therapy  3/3/2023    Order received, chart reviewed. Patient currently POD 0 from CABG x3. Follow up tomorrow as able for evaluation. Thank you. Lovely Johnson MS, OTR/L

## 2023-03-03 NOTE — PROGRESS NOTES
[facility] [facility]  Hospitalist Group                                                                                          Hospitalist Progress Note  Bryan Dimas MD  Answering service: [phone] from in house phone        Date of Service:  3/2/2023  NAME:  Haroon Childers  :  1945  MRN:  [id number]       Admission Summary: This is a 23-year-old gentleman who presented to [place] was chest pain and was diagnosed with an NSTEMI. He underwent left cardiac catheterization which showed multivessel CAD,he was then transferred to [facility] for cardiac surgery evaluation for surgical revascularization. Interval history / Subjective: Follow up Multivessel CAD  Comfortably laying in bed  No chest pain, SOB, dizziness  Wife at bedside     Assessment & Plan:     Non-ST elevation myocardial infarction with multivessel CAD  Transfer from [place] for evaluation for surgical revascularization. Continue with aspirin, carvedilol, losartan, Crestor and Zetia. On heparin drip. Evaluated by cardiac surgery team, who are tentatively scheduled for Friday. -Appreciate discussion with Cardiothoracic surgery  Moderate  aortic stenosis: Management per cardiac surgery  Chronic diastolic CHF NYHA II, stable  Hypertension: Titrate medications  Hyperlipidemia: Continue Crestor and Zetia    CRITICAL CARE ATTESTATION:  I had a face to face encounter with the patient, reviewed and interpreted patient data including clinical events, labs, images, vital signs, I/O's, and examined patient. I have discussed the case and the plan and management of the patient's care with the consulting services, the bedside nurses and necessary ancillary providers. NOTE OF PERSONAL INVOLVEMENT IN CARE   This patient has a high probability of imminent, clinically significant deterioration, which requires the highest level of preparedness to intervene urgently.  I participated in the decision-making and personally managed or directed the management of the following life and organ supporting interventions that required my frequent assessment to treat or prevent imminent deterioration. I personally spent 41 minutes of critical care time. This is time spent at this critically ill patient's bedside actively involved in patient care as well as the coordination of care and discussions with the patient's family. This does not include any procedural time which has been billed separately. Code status: Full code  Prophylaxis: On therapeutic heparin  Care Plan discussed with: Patient    Anticipated Disposition: To be determined  Inpatient  Cardiac monitoring: Telemetry     Hospital Problems  Date Reviewed: 2/28/2023            Codes Class Noted POA    * (Principal) NSTEMI (non-ST elevated myocardial infarction) [facility]) ICD-10-CM: I21.4  ICD-9-CM: 410.70  2/24/2023 Yes         Social Determinants of Health     Tobacco Use: Medium Risk    Smoking Tobacco Use: Former    Smokeless Tobacco Use: Never    Passive Exposure: Not on file   Alcohol Use: Not on file   Financial Resource Strain: Not on file   Food Insecurity: Not on file   Transportation Needs: Not on file   Physical Activity: Not on file   Stress: Not on file   Social Connections: Not on file   Intimate Partner Violence: Not on file   Depression: Not on file   Housing Stability: Not on file       Review of Systems:   A comprehensive review of systems was negative except for that written in the HPI. Vital Signs:    Last 24hrs VS reviewed since prior progress note.  Most recent are:  Visit Vitals  BP (!) 165/63 (BP 1 Location: Right lower arm, BP Patient Position: Lying)   Pulse (!) 59   Temp 99 °F (37.2 °C)   Resp 14   Wt 108.4 kg (238 lb 15.7 oz)   SpO2 93%   BMI 33.33 kg/m²         Intake/Output Summary (Last 24 hours) at 3/2/2023 1955  Last data filed at 3/2/2023 1936  Gross per 24 hour   Intake 2286.22 ml   Output 0 ml   Net 2286.22 ml          Physical Examination:     I had a face to face encounter with this patient and independently examined them on 3/2/2023 as outlined below:          General : alert x 3, awake, no acute distress,   HEENT: PEERL, EOMI, moist mucus membrane, TM clear  Neck: supple, no JVD, no meningeal signs  Chest: Clear to auscultation bilaterally   CVS: S1 S2 heard, Capillary refill less than 2 seconds  Abd: soft/ non tender, non distended, BS physiological,   Ext: no clubbing, no cyanosis, no edema, brisk 2+ DP pulses  Neuro/Psych: pleasant mood and affect, CN 2-12 grossly intact, sensory grossly within normal limit, Strength 5/5 in all extremities, DTR 1+ x 4  Skin: warm     Data Review:    Review and/or order of clinical lab test  Review and/or order of tests in the radiology section of CPT  Review and/or order of tests in the medicine section of CPT      I have personally and independently reviewed all pertinent labs, diagnostic studies, imaging, and have provided independent interpretation of the same. Labs:     Recent Labs     02/28/23  0644   WBC 6.4   HGB 11.7*   HCT 35.3*   *       Recent Labs     02/28/23  0644      K 4.0      CO2 26   BUN 12   CREA 0.84      CA 9.0       Recent Labs     02/28/23  0644   ALT 30   AP 59   TBILI 0.6   TP 6.4   ALB 3.3*   GLOB 3.1       Recent Labs     02/28/23 0644   APTT 27.0        No results for input(s): FE, TIBC, PSAT, FERR in the last 72 hours. No results found for: FOL, RBCF   No results for input(s): PH, PCO2, PO2 in the last 72 hours. No results for input(s): CPK, CKNDX, TROIQ in the last 72 hours.     No lab exists for component: CPKMB  Lab Results   Component Value Date/Time    Cholesterol, total 110 02/25/2023 02:28 AM    HDL Cholesterol 52 02/25/2023 02:28 AM    LDL, calculated 44.2 02/25/2023 02:28 AM    Triglyceride 69 02/25/2023 02:28 AM    CHOL/HDL Ratio 2.1 02/25/2023 02:28 AM     No results found for: [facility]  Lab Results   Component Value Date/Time    Color YELLOW/STRAW 02/27/2023 01:40 PM    Appearance CLEAR 02/27/2023 01:40 PM    Specific gravity 1.015 02/27/2023 01:40 PM    pH (UA) 6.0 02/27/2023 01:40 PM    Protein Negative 02/27/2023 01:40 PM    Glucose Negative 02/27/2023 01:40 PM    Ketone 15 (A) 02/27/2023 01:40 PM    Bilirubin Negative 02/27/2023 01:40 PM    Urobilinogen 1.0 02/27/2023 01:40 PM    Nitrites Negative 02/27/2023 01:40 PM    Leukocyte Esterase Negative 02/27/2023 01:40 PM    Epithelial cells FEW 02/27/2023 01:40 PM    Bacteria Negative 02/27/2023 01:40 PM    WBC 0-4 02/27/2023 01:40 PM    RBC 0-5 02/27/2023 01:40 PM       Notes reviewed from all clinical/nonclinical/nursing services involved in patient's clinical care. Care coordination discussions were held with appropriate clinical/nonclinical/ nursing providers based on care coordination needs. Patients current active Medications were reviewed, considered, added and adjusted based on the clinical condition today. Home Medications were reconciled to the best of my ability given all available resources at the time of admission. Route is PO if not otherwise noted.       Admission Status:[id number]:::1}      Medications Reviewed:     Current Facility-Administered Medications   Medication Dose Route Frequency    melatonin tablet 3 mg  3 mg Oral QHS PRN    aspirin chewable tablet 81 mg  81 mg Oral DAILY    chlorhexidine (PERIDEX) 0.12 % mouthwash 15 mL  15 mL Oral Q12H    ezetimibe (ZETIA) tablet 10 mg  10 mg Oral DAILY    mupirocin (BACTROBAN) 2 % ointment   Both Nostrils Q12H    rosuvastatin (CRESTOR) tablet 20 mg  20 mg Oral QHS    sodium chloride (NS) flush 5-40 mL  5-40 mL IntraVENous Q8H    sodium chloride (NS) flush 5-40 mL  5-40 mL IntraVENous Q8H    0.9% sodium chloride infusion  50 mL/hr IntraVENous CONTINUOUS    0.9% sodium chloride infusion 25 mL  25 mL IntraVENous PRN bisacodyL (DULCOLAX) suppository 10 mg  10 mg Rectal DAILY PRN    labetaloL (NORMODYNE;TRANDATE) injection 20 mg  20 mg IntraVENous Q4H PRN    morphine injection 2 mg  2 mg IntraVENous Q4H PRN    nitroglycerin (NITROSTAT) tablet 0.4 mg  0.4 mg SubLINGual Q5MIN PRN    sodium chloride (NS) flush 5-40 mL  5-40 mL IntraVENous PRN    sodium chloride (NS) flush 5-40 mL  5-40 mL IntraVENous PRN    promethazine (PHENERGAN) tablet 12.5 mg  12.5 mg Oral Q6H PRN    Or    ondansetron (ZOFRAN) injection 4 mg  4 mg IntraVENous Q6H PRN    acetaminophen (TYLENOL) tablet 650 mg  650 mg Oral Q6H PRN    Or    acetaminophen (TYLENOL) suppository 650 mg  650 mg Rectal Q6H PRN    heparin 25,000 units in D5W 250 ml infusion  9-25 Units/kg/hr IntraVENous TITRATE    nitroglycerin (NITROSTAT) tablet 0.4 mg  0.4 mg SubLINGual Q5MIN PRN    morphine injection 2 mg  2 mg IntraVENous Q4H PRN    carvediloL (COREG) tablet 6.25 mg  6.25 mg Oral BID WITH MEALS     Facility-Administered Medications Ordered in Other Encounters   Medication Dose Route Frequency    [START ON 3/3/2023] cardioplegia infusion   IntraVENous ONCE    [START ON 3/3/2023] cardioplegia infusion   IntraVENous ONCE    [START ON 3/3/2023] cardioplegia infusion   IntraVENous ONCE     ______________________________________________________________________  EXPECTED LENGTH OF STAY: 2d 9h  ACTUAL LENGTH OF STAY:          2                 Felicity Cruz MD

## 2023-03-03 NOTE — OP NOTES
Cardiothoracic Surgery Operative Note    Date of Dictation: 03/03/23    Date of Procedure: 03/03/23    Referring Physician: Braxton Ford MD.    Preoperative Diagnosis:    Stable Angina  Multivessel coronary artery disease  Moderate AS    Postoperative Diagnosis:    Same    Procedure:    1. CABG x 3.   Left internal mammary artery to LAD. Reverse saphenous vein graft to R-PDA. Reverse saphenous vein graft to OM. 2. EVH of the left leg. 3. Epiaortic Ultrasound. 4. Tissue AVR with #21 CE Inspiris. Surgeon: Cadence Lockwood MD, PhD, NIK, [facility]. Assistant(s): See Stack. The assistance of the PA was required due to the critical nature of the surgery. Anesthesia: General endotracheal anesthesia and MATTHIAS. Anesthesiologist(s):  Shilpa Low MD..    Findings:    The vein had a diameter of 3 mm and was of good quality. The coronaries had severe atherosclerotic disease. The ascending aorta was found to have no atheromatous disease by epiaortic ultrasound examination. Post-bypass LV systolic function was good. Post-bypass RV systolic function was good. Estimated Blood Loss:  Documented in the anesthesia record    STS Data: STS Calculated Mortality Risk 4.2 %. The risks, benefits and alternatives to surgery were discussed with the patient and they requested to proceed with surgery. Shwetha-operative antibiotics and beta blockers were given within the STS-recommended windows. Operative Details: The patient was placed supine on the operating table. Standard monitors and lines were placed, anesthesia was given and the patient was intubated. A MATTHIAS was performed. The patient was prepped and draped in a sterile manner. A pre-incision time-out was performed. A median sternotomy was performed. Hemostasis was achieved. The left internal mammary artery was harvested in-situ in a pedicled fashion and had good flow. Meanwhile, saphenous vein from the leg was harvested endoscopically.  IV heparin was given to maintain an ACT over 400. The pericardium was opened. An epiaortic ultrasound was performed which demonstrated no atheromatous disease in the ascending aorta. The aorta and right atrium were cannulated for cardiopulmonary bypass. A retrograde coronary sinus catheter and LV vent were placed. A slit was fashioned in the pericardium to allow passage of the LIMA into the mediastinum. The coronary arteries were inspected and grafting targets were identified. The vein graft was inspected and determined to be of adequate quality for use as a conduit. Cardiopulmonary bypass was initiated. A cardioplegia cannula was placed in the root. The aorta was cross-clamped. One liter of cold blood cardioplegia was given in an antegrade fashion to achieved diastolic arrest with subsequent doses at appropriate intervals between the construction of the subsequent anastomoses. The R-PDA distal target vessel was identified and exposed. It was found to be 1.25 mm in diameter and of adequate quality for bypass. The saphenous vein was reversed and anastomosed in an end-to-side fashion with 7-0 prolene suture. The anastomosis was then checked for water-tightness. Next, the OM distal target vessel was identified and exposed. It was found to be 2 mm in diameter and of adequate quality for bypass. The saphenous vein was reversed and anastomosed in an end-to-side fashion with 7-0 prolene suture. The anastomosis was then checked for water-tightness. Next, the LAD distal target vessel was identified and exposed. It was found to be 1.75 mm in diameter and of adequate quality for bypass. A slit was fashioned in the pericardium and the LIMA was prepared and anastomosed in an end-to-side fashion with 7-0 prolene suture. The anastomosis was then checked for water-tightness. The LIMA was clamped with a bulldog clamp. We then turned our attention to the aortic valve. A transverse aortotomy was performed.   The aortic valve was found to be tricuspid and heavily calcified. The 3 cusps were carefully excised. Excess calcium was debrided from the annulus with a rongeurs. All debris was carefully removed. The annulus was sized. The size fell between a 21 or 23. The 23 was rather tight fit and questionable. A series of pledgeted vertical mattress sutures were placed circumferentially in the annulus. The annulus was resized and it was clear that a 21 would be a better fit. The valve stitches which were passed through the sewing cuff of the valve. The valve was lowered into position and the sutures were secured with cor knot device. The aorta was closed with running layers of 5-0 Prolene suture. 2 holes were made in the ascending aorta for the proximal anastomoses. These were constructed using 5-0 Prolene suture. The heart was de-aired and the cross-clamp was removed. The LIMA bulldog was removed. All anastomotic sites were inspected for hemostasis. The left pleural space was suctioned and ventilation was resumed. Atrial and ventricular pacing wires were installed. The heart did not require defibrillation and did   require pacing prior to the resumption of a regular atrial rhythm. The patient was weaned from cardiopulmonary bypass. The cardioplegia and venous cannulae were removed. The aortic cannula was removed following a test dose of protamine, after which the remainder of the protamine was administered. Chest drains were placed. Hemostasis was reconfirmed at all surgical sites. The sternum was reapproximated with stainless steel wires. The presternal fascia, subcutaneous and dermal layers were reapproximated with running sutures. The wounds were covered with sterile dressings. The PA was critical for harvest of graft conduit as well as cannulation, assistance with construction of each anastomosis, decannulation, and closure of the wound. Specimens: Aortic valve leaflets .     Pacing Wires: Atrial x 2, Ventricular X 2. Chest Tubes: Left pleural, posterior pericardial and mediastinal.    CPB Time:  211 min. Aortic Clamp Time:  185 min. Complications:  None. Disposition: Cardiovascular recovery room. Condition: Critical but stable.

## 2023-03-03 NOTE — ANESTHESIA PROCEDURE NOTES
MATTHIAS        Procedure Details: probe placement, image aquisition & interpretation    Site marked  Risks and benefits discussed with the patient and plans are to proceed    Procedure Note    Performed by: Maxi Gonzalez MD  Authorized by: Maxi Gonzalez MD     Indications: assessment of ascending aorta and assessment of surgical repair  Modalities: 2D, CF, CWD, contrast, PWD  Probe Type: epiaortic and multiplane  Insertion: atraumatic  Patient Status: intubated  Echocardiographic and Doppler Measurements   Aorta  Size  Diam(cm)  Dissection PlaqueThick(mm)  Plaque Mobile    Ascending normal  No 0-3 No    Arch normal  No 0-3 No    Descending normal  No >3 No          Valves  Annulus  Stenosis  Area/Grad  Regurg  Leaflet   Morph  Leaflet   Motion    Aortic calcified severe 0.5,65/32 1+ calcified restricted    Mitral normal   1+ normal normal    Tricuspid normal none  1+ normal normal          Atria  Size  SEC (smoke)  Thrombus  Tumor  Device    Rt Atrium normal No No No No    Lt Atrium normal No No No No     Interatrial Septum Morphology: normal    Interventricular Septum Morphology: normal  Ventricle  Cavity Size  Cavity Dimension Hypertrophy  Thrombus  Gloal FXN  EF    RV normal  No no normal     LV normal  Yes No normal 60       Regional Function  (1 = normal, 2 = mildly hypokinetic, 3 = severely hypokinetic, 4 = akinetic, 5 = dyskinetic) LAV - [place]   ME LAV = 0  ME LAV = 90  ME LAV = 130   Basal Sept:1 Basal Ant:1 Basal Post:1   Mid Sept:1 Mid Ant:1 Mid Post:1   Apical Sept:1 Apical Ant:1 Basal Ant Sept:1   Basal Lat:1 Basal Inf:1 Mid Ant Sept:1   Mid Lat:1 Mid Inf:1    Apical Lat:1 Apical Inf:1      Diastolic function: Stage 2 diastolic dysfunction  Pericardium: normal    Post Intervention Follow-up Study  Ventricular Global Function: improved  Ventricular Regional Function: improved     Valve  Function  Regurgitation  Area    Aortic improved 0     Mitral no change 1+     Tricuspid no change 1+ Prosthetic normal       Complications: None  Comments: Post MATTHIAS  LV fn preserved, ef 55%,Rv Normal  21 size bioprosthetic valve in aortic region. No PVL, No AI. Mean 2 mm across the AV.

## 2023-03-03 NOTE — PROGRESS NOTES
1500 TRANSFER - IN REPORT:    Verbal report received from [employer], [place] and CRNA(name) on Jame Lopez  being received from OR(unit) for routine post - op      Report consisted of patients Situation, Background, Assessment and   Recommendations(SBAR). Information from the following report(s) SBAR, Kardex, OR Summary, Procedure Summary, Intake/Output, MAR, Recent Results, and Cardiac Rhythm AV paced  was reviewed with the receiving nurse. Opportunity for questions and clarification was provided. Assessment completed upon patients arrival to unit and care assumed. 1528 ABG : 7.36 /45.8 /109 /25.9 /98.0 Discussed w/ Raghav Oliver NP. Plan: increase RR on the vent from 16 to 18; Peep from 5 to 10. Also discussed hemodynamics. Plan: transfuse 1 Plts, 1 Cryo, 1 FFP; transition off Cam, utilize Levo; Start Fentanyl Drip. 1 Dr [place] at the bedside checking intrinsic rhythm. Pt's rhythm is s Ab [place] at 62 w/ ST elevations. Pt was left on AAI 80, 10 mA;    1545 RT at the bedside for vent settings adjustments. 1600 Wife at the bedside. Raghav Oliver NP updating wife at the bedside. 1608 Plt transfusion started. 28847 Ptl transfusion completed. 1732 All  ordered products infused. Repeat labs sent. 1810 Hgb 6.9 noted. Plt 79. Raghav Oliver NP at the bedside. Plan: transfuse 2 units RBC's, and one of Plts. Start Epi at 2 mcg.     1840 RBC transfusion started. 2000 Bedside and Verbal shift change report given to Himanshu Bay RN (oncoming nurse) by Carolyn Sesay RN (offgoing nurse). Report included the following information SBAR, Kardex, OR Summary, Procedure Summary, Intake/Output, MAR, Recent Results, and Cardiac Rhythm A paced. Tyree Chapman

## 2023-03-03 NOTE — CONSENT
----- Message from She Ramirez sent at 4/26/2019  3:39 PM CDT -----  Contact: pt  Pt stated they couldn't do the the biopsy due to blood thinners , needs to ok if pt can stop for 5 days or recommend bridging with heparin  .. .[phone] (home)      Informed Consent for Blood Component Transfusion Note    I have discussed with the spouse the rationale for blood component transfusion; its benefits in treating or preventing fatigue, organ damage, or death; and its risk which includes mild transfusion reactions, rare risk of blood borne infection, or more serious but rare reactions. I have discussed the alternatives to transfusion, including the risk and consequences of not receiving transfusion. The spouse had an opportunity to ask questions and had agreed to proceed with transfusion of blood components.     Electronically signed by Beronica Almonte NP on 3/3/23 at 4:42 PM

## 2023-03-03 NOTE — PERIOP NOTES
Patient interviewed in Main Operating Room/Cardiac Surgery, Pre-op Holding. Patient identifiers verified with name and date of birth. Procedure verified with patient. Consent forms verified. Allergies verified. Patient informed of procedure and plan of care. Questions answered with review. Patient voiced understanding of procedure and plan of care. Patient arrived to the operating room via stretcher. All wheels locked and patient transferred to the OR table with the assistance of four people. MTRE warming wrap present on OR table. Temp set at 37 C and monitored by perfusion. Unit # [id number]. After the induction of anesthesia and intubation, a 16 fr temp sensing jenkins catheter was placed without difficulty. 10 ml of sterile water was used to inflate the balloon. Clear, yellow urine return noted. Urine output monitored by anesthesia. Mepilex sacral pad placed in pre-op, heel pads placed prior to anesthesia induction. Fluids:   0.9 % Sodium Chloride:   PRN irrigation    Sterile water:   1000 ml in splash basin for instrument care.

## 2023-03-03 NOTE — PROGRESS NOTES
1930 Bedside shift change report given to Chloe (oncoming nurse) by Marcin Bess (offgoing nurse). Report included the following information SBAR, Kardex, Intake/Output, MAR, Recent Results, and Cardiac Rhythm Sinus kurt/NSR .     0545 TRANSFER - OUT REPORT:    Verbal report given to PACU RN(name) on Alfredo Tracy  being transferred to [facility] for ordered procedure       Report consisted of patients Situation, Background, Assessment and   Recommendations(SBAR). Information from the following report(s) SBAR, Kardex, Intake/Output, MAR, Recent Results, and Cardiac Rhythm Sinus kurt/NSR  was reviewed with the receiving nurse. Lines:   Peripheral IV 02/24/23 Left Antecubital (Active)   Site Assessment Clean, dry, & intact 03/03/23 0458   Phlebitis Assessment 0 03/03/23 0458   Infiltration Assessment 0 03/03/23 0458   Dressing Status Clean, dry, & intact 03/03/23 0458   Dressing Type Transparent;Tape 03/03/23 0458   Hub Color/Line Status Pink;Capped 03/03/23 0458   Action Taken Open ports on tubing capped 03/03/23 0458   Alcohol Cap Used Yes 03/03/23 0458       Peripheral IV 02/24/23 Posterior;Right Hand (Active)   Site Assessment Clean, dry, & intact 03/03/23 0458   Phlebitis Assessment 0 03/03/23 0458   Infiltration Assessment 0 03/03/23 0458   Dressing Status Clean, dry, & intact 03/03/23 0458   Dressing Type Transparent;Tape 03/03/23 0458   Hub Color/Line Status Pink;Capped 03/03/23 0458   Action Taken Open ports on tubing capped 03/03/23 0458   Alcohol Cap Used Yes 03/03/23 0458        Opportunity for questions and clarification was provided.       Patient transported with:   Monitor  Registered Nurse      Problem: Patient Education: Go to Patient Education Activity  Goal: Patient/Family Education  Outcome: Progressing Towards Goal     Problem: Patient Education: Go to Patient Education Activity  Goal: Patient/Family Education  Outcome: Progressing Towards Goal     Problem: CABG: Pre-Op Day  Goal: Activity/Safety  Outcome: Progressing Towards Goal  Goal: Consults, if ordered  Outcome: Progressing Towards Goal  Goal: Diagnostic Test/Procedures  Outcome: Progressing Towards Goal  Goal: Nutrition/Diet  Outcome: Progressing Towards Goal  Goal: Medications  Outcome: Progressing Towards Goal  Goal: Treatments/Interventions/Procedures  Outcome: Progressing Towards Goal  Goal: Discharge Planning  Outcome: Progressing Towards Goal  Goal: Psychosocial  Outcome: Progressing Towards Goal  Goal: *Hemodynamically stable  Outcome: Progressing Towards Goal  Goal: *Consent obtained, permits and diagnostics complete  Outcome: Progressing Towards Goal     Problem: Falls - Risk of  Goal: *Absence of Falls  Description: Document Joie Fall Risk and appropriate interventions in the flowsheet.   Outcome: Progressing Towards Goal  Note: Fall Risk Interventions:                                Problem: Patient Education: Go to Patient Education Activity  Goal: Patient/Family Education  Outcome: Progressing Towards Goal

## 2023-03-03 NOTE — ANESTHESIA POSTPROCEDURE EVALUATION
Procedure(s):  CORONARY ARTERY BYPASS GRAFTING X 3 WITH LIMA AND LESVGH,AORTIC VALVE REPLACEMENT, ECC, MATTHIAS AND EPIAORTIC U/S BY DR ROWELL [facility]. general    Anesthesia Post Evaluation        Patient location during evaluation: PACU  Note status: Adequate. Level of consciousness: responsive to verbal stimuli and sleepy but conscious  Pain management: satisfactory to patient  Airway patency: patent  Anesthetic complications: no  Cardiovascular status: acceptable  Respiratory status: acceptable  Hydration status: acceptable  Comments: +Post-Anesthesia Evaluation and Assessment    Patient: Crystal Morales MRN: [id number]  SSN: [id number]   YOB: 1945  Age: 76 y.o. Sex: male          Cardiovascular Function/Vital Signs    /71   Pulse 80   Temp (!) 35.7 °C (96.3 °F)   Resp 12   Ht 5' 11\" (1.803 m)   Wt 108.4 kg (238 lb 15.7 oz)   SpO2 100%   BMI 33.33 kg/m²     Patient is status post Procedure(s):  CORONARY ARTERY BYPASS GRAFTING X 3 WITH LIMA AND LESVGH,AORTIC VALVE REPLACEMENT, ECC, MATTHIAS AND EPIAORTIC U/S BY DR ROWELL [facility]. Nausea/Vomiting: Controlled. Postoperative hydration reviewed and adequate. Pain:  Pain Scale 1: Numeric (0 - 10) (03/03/23 2327)  Pain Intensity 1: 0 (03/03/23 3294)   Managed. Neurological Status: At baseline. Mental Status and Level of Consciousness: Arousable. Pulmonary Status:   O2 Device: Endotracheal tube;Ventilator (03/03/23 1500)   Adequate oxygenation and airway patent. Complications related to anesthesia: None    Post-anesthesia assessment completed. No concerns. I have evaluated the patient and the patient is stable and ready to be discharged from PACU .     Signed By: Huy Moyer MD    3/3/2023        INITIAL Post-op Vital signs:   Vitals Value Taken Time   /71 03/03/23 1504   Temp 35.7 °C (96.3 °F) 03/03/23 1500   Pulse 80 03/03/23 1511   Resp 0 03/03/23 1511   SpO2 98 % 03/03/23 1511   Vitals shown include unvalidated device data.

## 2023-03-03 NOTE — ANESTHESIA PROCEDURE NOTES
Arterial Line Placement    Start time: 3/3/2023 7:01 AM  End time: 3/3/2023 7:11 AM  Performed by: Terrie Conway MD  Authorized by: Terrie Conway MD     Pre-Procedure  Indications:  Arterial pressure monitoring and blood sampling  Preanesthetic Checklist: patient identified, risks and benefits discussed, anesthesia consent, site marked, patient being monitored, timeout performed and patient being monitored      Procedure:   Prep:  Chlorhexidine  Seldinger Technique?: Yes    Orientation:  Right  Location:  Radial artery  Catheter size:  20 G  Number of attempts:  1    Assessment:   Post-procedure:  Line secured and sterile dressing applied  Patient Tolerance:  Patient tolerated the procedure well with no immediate complications

## 2023-03-03 NOTE — PROGRESS NOTES
Physical Therapy  3/3/2023    Order received, chart reviewed. Patient currently POD 0 from CABG x3. Follow up tomorrow as able for evaluation. Thank you.     Mechelle Guthrie, PT, DPT

## 2023-03-03 NOTE — ANESTHESIA PREPROCEDURE EVALUATION
Relevant Problems   No relevant active problems       Anesthetic History   No history of anesthetic complications            Review of Systems / Medical History  Patient summary reviewed, nursing notes reviewed and pertinent labs reviewed    Pulmonary  Within defined limits                 Neuro/Psych   Within defined limits           Cardiovascular    Hypertension          Past MI, CAD and hyperlipidemia    Exercise tolerance: >4 METS     GI/Hepatic/Renal  Within defined limits              Endo/Other  Within defined limits           Other Findings              Physical Exam    Airway  Mallampati: II  TM Distance: > 6 cm  Neck ROM: normal range of motion   Mouth opening: Normal     Cardiovascular  Regular rate and rhythm,  S1 and S2 normal,  no murmur, click, rub, or gallop             Dental  No notable dental hx       Pulmonary  Breath sounds clear to auscultation               Abdominal  GI exam deferred       Other Findings            Anesthetic Plan    ASA: 3  Anesthesia type: general    Monitoring Plan: Arterial line, MATTHIAS, [place], CVP and BIS      Induction: Intravenous  Anesthetic plan and risks discussed with: Patient

## 2023-03-03 NOTE — BRIEF OP NOTE
Brief Postoperative Note    Patient: Polina Post  YOB: 1945  MRN: [id number]    Date of Procedure: 3/3/2023     Pre-Op Diagnosis: CORONARY ARTERY DISEASE, AORTIC STENOSIS    Post-Op Diagnosis: Same as preoperative diagnosis. Procedure(s):  CORONARY ARTERY BYPASS GRAFTING X 3 WITH LIMA-LAD, RSVG-PDA, RSVG- OM2  AND LESVGH,AORTIC VALVE REPLACEMENT with 21 mm tissue valve, ECC, MATTHIAS AND EPIAORTIC U/S BY DR ROWELL [facility]    Surgeon(s):  Cralo Alamo MD  [place], [place]    Surgical Assistant: Beth King PA-C    Anesthesia: General     Estimated Blood Loss (mL): 1000    Drips : precedex, Insulin, Cam    Complications: None    Specimens:   ID Type Source Tests Collected by Time Destination   1 : aortic valve Fresh Aortic Valve  Carlo Alamo MD 3/3/2023 1214 Pathology        Implants:   Implant Name Type Inv.  Item Serial No.  Lot No. LRB No. Used Action   VALVE AORTIC 21MM -- DOM SHAIKH - [id number]  VALVE AORTIC 21MM -- Arjun Gardner [id number] [employer] NA N/A 1 Implanted       Drains:   Inez Habermann #1 03/03/23 Lower;Mid Chest (Active)   Site Assessment Clean, dry, & intact 03/03/23 0814   Dressing Status Clean, dry, & intact 03/03/23 0814   Drainage Description Serosanguinous 03/03/23 0814   Sathya Drain Airleak No 03/03/23 0814   Status Patent;Suction (specify 03/03/23 0814   Y Connector Used Yes 03/03/23 0814       [place] Habermann #2 03/03/23 Lower;Mid Chest (Active)   Site Assessment Clean, dry, & intact 03/03/23 0814   Dressing Status Clean, dry, & intact 03/03/23 0814   Drainage Description Serosanguinous 03/03/23 0814   Sathya Drain Airleak No 03/03/23 0814   Status Patent;Suction (specify 03/03/23 0814   Y Connector Used Yes 03/03/23 0814       Davida Sandraermann #3 03/03/23 Lower;Mid Chest (Active)   Site Assessment Clean, dry, & intact 03/03/23 0814   Dressing Status Clean, dry, & intact 03/03/23 0814   Drainage Description Serosanguinous 03/03/23 8564 Sathya Drain Airleak No 03/03/23 0448   Status Patent;Suction (specify 03/03/23 8301   Y Connector Used Yes 03/03/23 0814       Findings: see full Op note    Electronically Signed by Marie Gallegos PA-C on 3/3/2023 at 2:59 PM

## 2023-03-03 NOTE — CONSULTS
[facility]    ICU TEAM CONSULTANT Note    Name: Micaela Shepard   : 1945   MRN: [id number]   Date: 3/3/2023      Assessment and Plan:     Briefly,    ICU Problems:  Requirement of mechanical ventilation post surgical procedure:  Wean as tolerated. Would not recommend extubation till bleeding controlled  Cardiogenic shock:  Management by CTS. Likely routine post CPB stunning. Inotropic support considered given low CI however may be hypovolemia as well  Hypovolemic shock:  Management by CTS. Anticipate further volume and product resuscitation may be needed   Acute blood loss anemia:  management by CTS. CT output has been high. Acute coagulopathy may be contributing. Once corrected if remains high will address with surgical team.  Acute coagulopathy:  Management by CTS. Agree with platelets. Next would consider FFP. Fibrinogen WNL and cryo unlikely to be helpful. Re dose protamine another consideration. POD:  Day of Surgery    S/P:   Procedure(s):  CORONARY ARTERY BYPASS GRAFTING X 3 WITH LIMA AND LESVGH,AORTIC VALVE REPLACEMENT, ECC, MATTHIAS AND EPIAORTIC U/S BY DR ROWELL [facility]    Active Problem List:     Problem List  Date Reviewed: 2023            Codes Class    S/P AVR ICD-10-CM: Z95.2  ICD-9-CM: V43.3         * (Principal) NSTEMI (non-ST elevated myocardial infarction) ([facility] [address] ICD-10-CM: I21.4  ICD-9-CM: 410.70         Chest pain ICD-10-CM: R07.9  ICD-9-CM: 786.50            Past Medical History:      has a past medical history of Hypercholesterolemia and Hypertension. Past Surgical History:      has no past surgical history on file. Home Medications:     Prior to Admission medications    Medication Sig Start Date End Date Taking? Authorizing Provider   rosuvastatin (CRESTOR) 10 mg tablet Take 10 mg by mouth nightly. Yes Other, MD Jana   ezetimibe (ZETIA) 10 mg tablet Take 10 mg by mouth.    Yes Other, MD Jana   telmisartan (MICARDIS) 20 mg tablet Take 20 mg by mouth daily. Yes Other, MD Jana       Allergies/Social/Family History:     No Known Allergies   Social History     Tobacco Use    Smoking status: Former     Types: Cigarettes    Smokeless tobacco: Never   Substance Use Topics    Alcohol use: Not Currently      Family History   Problem Relation Age of Onset    Heart Disease Father          Objective:   Vital Signs:  Visit Vitals  /71   Pulse 80   Temp 96.9 °F (36.1 °C)   Resp 18   Ht 5' 11\" (1.803 m)   Wt 108.4 kg (238 lb 15.7 oz)   SpO2 100%   BMI 33.33 kg/m²    O2 Flow Rate (L/min): 2 l/min O2 Device: Endotracheal tube, Ventilator Temp (24hrs), Av.3 °F (36.3 °C), Min:96.3 °F (35.7 °C), Max:99 °F (37.2 °C)    CVP (mmHg): 16 mmHg (23 1630)      Intake/Output:     Intake/Output Summary (Last 24 hours) at 3/3/2023 1637  Last data filed at 3/3/2023 1625  Gross per 24 hour   Intake 3979.71 ml   Output 1850 ml   Net 2129.71 ml       Physical Exam:    Gen:  NAD  Neuro:  Intubated and sedated  Resp clear  CV paced  Abd soft  Extremities no edema    LABS AND  DATA: Personally reviewed  Recent Labs     23  1517 23  0502   WBC 10.1 6.0   HGB 9.3* 11.6*   HCT 28.0* 35.5*   PLT 70* 140*     Recent Labs     23  1517 23  0502    138   K 3.8 4.1   * 106   CO2 27 28   BUN 11 12   CREA 0.81 0.81   GLU 82 95   CA 7.9* 8.9   MG 2.8*  --      Recent Labs     23  151   AP 37*   TP 5.2*   ALB 3.3*   GLOB 1.9*     Recent Labs     23  151   INR 1.5*   PTP 15.0*   APTT 30.7      No results for input(s): PHI, PCO2I, PO2I, FIO2I in the last 72 hours. No results for input(s): CPK, CKMB, TROIQ, BNPP in the last 72 hours.     Hemodynamics:   PAP: PAP Systolic: 33 (44/92/12 8592) CO: CO (l/min): 3.8 l/min (23 1630)   Wedge:   CI: CI (l/min/m2): 1.7 l/min/m2 (23)   CVP:  CVP (mmHg): 16 mmHg (23) SVR:       PVR:       Ventilator Settings:  Mode Rate Tidal Volume Pressure FiO2 PEEP   SIMV, Pressure support 450 ml  5 cm H2O 80 % 5 cm H20     Peak airway pressure: 20 cm H2O    Minute ventilation: 7.45 l/min        MEDS: Reviewed    Chest X-Ray:  CXR Results  (Last 48 hours)                 03/03/23 1531  XR CHEST PORT Final result    Impression:  Status post sternotomy. Typical post sternotomy changes. Narrative:  Clinical history: postop heart   INDICATION:   postop heart   COMPARISON: 2/24/2023       FINDINGS:   AP portable upright view of the chest demonstrates a status post sternotomy with   enlarged cardiopericardial silhouette. Small right-sided effusion. ET tube, NG   tube and pulmonary artery catheters are within normal limits. Mild increased   interstitial opacity. .Pleural drainage catheter in place. . Patient is on a   cardiac monitor. CRITICAL CARE CONSULTANT NOTE  I had a face to face encounter with the patient, reviewed and interpreted patient data including clinical events, labs, images, vital signs, I/O's, and examined patient. I have discussed the case and the plan and management of the patient's care with the consulting services, the bedside nurses and the respiratory therapist.      NOTE OF PERSONAL INVOLVEMENT IN CARE   This patient has a high probability of imminent, clinically significant deterioration, which requires the highest level of preparedness to intervene urgently. I participated in the decision-making and personally managed or directed the management of the following life and organ supporting interventions that required my frequent assessment to treat or prevent imminent deterioration. I personally spent 30 minutes of critical care time. This is time spent at this critically ill patient's bedside actively involved in patient care as well as the coordination of care and discussions with the patient's family. This does not include any procedural time which has been billed separately.       [address] [facility]

## 2023-03-03 NOTE — ANESTHESIA PROCEDURE NOTES
Central Line Placement    Start time: 3/3/2023 7:12 AM  End time: 3/3/2023 7:23 AM  Performed by: Matt Burgess MD  Authorized by: Matt Burgess MD     Indications: vascular access, central pressure monitoring and need for vasopressors  Preanesthetic Checklist: patient identified, risks and benefits discussed, anesthesia consent, site marked, patient being monitored, timeout performed and fire risk safety assessment completed and verbalized      Pre-procedure: All elements of maximal sterile barrier technique followed? Yes    2% Chlorhexidine for cutaneous antisepsis, Hand hygiene performed prior to catheter insertion and Ultrasound guidance    Sterile Ultrasound Technique followed?: Yes            Procedure:   Prep:  Chlorhexidine    Orientation:  Right    Catheter type:  Triple lumen  Catheter size:  9 Fr  Catheter length:  16 cm  Number of attempts:  1  Successful placement: Yes      Assessment:   Post-procedure:  Catheter secured, sterile dressing applied and sterile dressing with CHG applied  Assessment:  Blood return through all ports and free fluid flow  Insertion:  Uncomplicated  Patient tolerance:  Patient tolerated the procedure well with no immediate complications  PA catheter inserted .

## 2023-03-03 NOTE — PROGRESS NOTES
Cardiac Surgery Coordinator- Met with the wife of Mark Ritchie, reviewed role of the Cardiac Surgery Care Coordinator. Reviewed plan of care and day of surgery expectations. Provided her with an update from OR. Encouraged her  to verbalize and emotional support given. Will continue to update throughout the day. 1145- Met with Mrs Reinaldo Clark, provided update and encouraged her to verbalize. Will continue to follow. 1400- Met with Danilo Pérez's wife and Dr. Paula Perez. Update given, Encouraged her to verbalize and offered emotional support. Reinforced Surgical waiting room instructions, she is to wait in the main surgical waiting room until contacted by the nursing staff. 1500- Mrs Reinaldo Clark escorted to the fourth floor waiting room. Exchanged contact information and offered emotional support. Will continue to follow.

## 2023-03-04 ENCOUNTER — APPOINTMENT (OUTPATIENT)
Dept: GENERAL RADIOLOGY | Age: 78
DRG: 219 | End: 2023-03-04
Attending: PHYSICIAN ASSISTANT
Payer: MEDICARE

## 2023-03-04 LAB
ACUTE KIDNEY INJURY RISK NEPHROCHECK: 0.8 (ref 0–0.3)
ACUTE KIDNEY INJURY RISK NEPHROCHECK: 1.14 (ref 0–0.3)
ADMINISTERED INITIALS, ADMINIT: NORMAL
ALBUMIN SERPL-MCNC: 3.2 G/DL (ref 3.5–5)
ALBUMIN SERPL-MCNC: 3.2 G/DL (ref 3.5–5)
ALBUMIN/GLOB SERPL: 1.5 (ref 1.1–2.2)
ALBUMIN/GLOB SERPL: 1.6 (ref 1.1–2.2)
ALP SERPL-CCNC: 35 U/L (ref 45–117)
ALP SERPL-CCNC: 36 U/L (ref 45–117)
ALT SERPL-CCNC: 27 U/L (ref 12–78)
ALT SERPL-CCNC: 27 U/L (ref 12–78)
ANION GAP SERPL CALC-SCNC: 4 MMOL/L (ref 5–15)
ANION GAP SERPL CALC-SCNC: 7 MMOL/L (ref 5–15)
AST SERPL-CCNC: 116 U/L (ref 15–37)
AST SERPL-CCNC: 89 U/L (ref 15–37)
ATRIAL RATE: 53 BPM
BASE DEFICIT BLD-SCNC: 0.4 MMOL/L
BASE DEFICIT BLD-SCNC: 0.8 MMOL/L
BASOPHILS # BLD: 0 K/UL (ref 0–0.1)
BASOPHILS NFR BLD: 0 % (ref 0–1)
BDY SITE: ABNORMAL
BILIRUB SERPL-MCNC: 0.4 MG/DL (ref 0.2–1)
BILIRUB SERPL-MCNC: 0.8 MG/DL (ref 0.2–1)
BLD PROD TYP BPU: NORMAL
BPU ID: NORMAL
BUN SERPL-MCNC: 16 MG/DL (ref 6–20)
BUN SERPL-MCNC: 17 MG/DL (ref 6–20)
BUN/CREAT SERPL: 19 (ref 12–20)
BUN/CREAT SERPL: 19 (ref 12–20)
CA-I BLD-SCNC: 1.13 MMOL/L (ref 1.12–1.32)
CALCIUM SERPL-MCNC: 8.1 MG/DL (ref 8.5–10.1)
CALCIUM SERPL-MCNC: 8.5 MG/DL (ref 8.5–10.1)
CALCULATED R AXIS, ECG10: -29 DEGREES
CALCULATED T AXIS, ECG11: -15 DEGREES
CHLORIDE SERPL-SCNC: 107 MMOL/L (ref 97–108)
CHLORIDE SERPL-SCNC: 109 MMOL/L (ref 97–108)
CO2 SERPL-SCNC: 26 MMOL/L (ref 21–32)
CO2 SERPL-SCNC: 27 MMOL/L (ref 21–32)
COHGB MFR BLD: 1.5 % (ref 1–2)
CREAT SERPL-MCNC: 0.86 MG/DL (ref 0.7–1.3)
CREAT SERPL-MCNC: 0.9 MG/DL (ref 0.7–1.3)
D50 ADMINISTERED, D50ADM: 0 ML
D50 ORDER, D50ORD: 0 ML
DIAGNOSIS, 93000: NORMAL
DIFFERENTIAL METHOD BLD: ABNORMAL
EOSINOPHIL # BLD: 0 K/UL (ref 0–0.4)
EOSINOPHIL NFR BLD: 0 % (ref 0–7)
ERYTHROCYTE [DISTWIDTH] IN BLOOD BY AUTOMATED COUNT: 13.1 % (ref 11.5–14.5)
ERYTHROCYTE [DISTWIDTH] IN BLOOD BY AUTOMATED COUNT: 13.2 % (ref 11.5–14.5)
FIBRINOGEN PPP-MCNC: 357 MG/DL (ref 200–475)
GLOBULIN SER CALC-MCNC: 2 G/DL (ref 2–4)
GLOBULIN SER CALC-MCNC: 2.2 G/DL (ref 2–4)
GLUCOSE BLD STRIP.AUTO-MCNC: 101 MG/DL (ref 65–117)
GLUCOSE BLD STRIP.AUTO-MCNC: 113 MG/DL (ref 65–117)
GLUCOSE BLD STRIP.AUTO-MCNC: 114 MG/DL (ref 65–117)
GLUCOSE BLD STRIP.AUTO-MCNC: 117 MG/DL (ref 65–117)
GLUCOSE BLD STRIP.AUTO-MCNC: 120 MG/DL (ref 65–117)
GLUCOSE BLD STRIP.AUTO-MCNC: 122 MG/DL (ref 65–117)
GLUCOSE BLD STRIP.AUTO-MCNC: 122 MG/DL (ref 65–117)
GLUCOSE BLD STRIP.AUTO-MCNC: 123 MG/DL (ref 65–117)
GLUCOSE BLD STRIP.AUTO-MCNC: 125 MG/DL (ref 65–117)
GLUCOSE BLD STRIP.AUTO-MCNC: 157 MG/DL (ref 65–117)
GLUCOSE BLD STRIP.AUTO-MCNC: 165 MG/DL (ref 65–117)
GLUCOSE BLD STRIP.AUTO-MCNC: 186 MG/DL (ref 65–117)
GLUCOSE BLD STRIP.AUTO-MCNC: 207 MG/DL (ref 65–117)
GLUCOSE BLD STRIP.AUTO-MCNC: 86 MG/DL (ref 65–117)
GLUCOSE BLD STRIP.AUTO-MCNC: 99 MG/DL (ref 65–117)
GLUCOSE SERPL-MCNC: 110 MG/DL (ref 65–100)
GLUCOSE SERPL-MCNC: 120 MG/DL (ref 65–100)
GLUCOSE, GLC: 101 MG/DL
GLUCOSE, GLC: 113 MG/DL
GLUCOSE, GLC: 114 MG/DL
GLUCOSE, GLC: 117 MG/DL
GLUCOSE, GLC: 120 MG/DL
GLUCOSE, GLC: 122 MG/DL
GLUCOSE, GLC: 122 MG/DL
GLUCOSE, GLC: 123 MG/DL
GLUCOSE, GLC: 125 MG/DL
GLUCOSE, GLC: 157 MG/DL
GLUCOSE, GLC: 165 MG/DL
GLUCOSE, GLC: 186 MG/DL
GLUCOSE, GLC: 207 MG/DL
GLUCOSE, GLC: 86 MG/DL
GLUCOSE, GLC: 99 MG/DL
HCO3 BLD-SCNC: 24.5 MMOL/L (ref 22–26)
HCO3 BLD-SCNC: 24.8 MMOL/L (ref 22–26)
HCT VFR BLD AUTO: 21.5 % (ref 36.6–50.3)
HCT VFR BLD AUTO: 24.2 % (ref 36.6–50.3)
HCT VFR BLD AUTO: 24.5 % (ref 36.6–50.3)
HGB BLD-MCNC: 7.6 G/DL (ref 12.1–17)
HGB BLD-MCNC: 8.6 G/DL (ref 12.1–17)
HGB BLD-MCNC: 8.7 G/DL (ref 12.1–17)
HIGH TARGET, HITG: 130 MG/DL
IMM GRANULOCYTES # BLD AUTO: 0 K/UL (ref 0–0.04)
IMM GRANULOCYTES NFR BLD AUTO: 0 % (ref 0–0.5)
INR PPP: 1.2 (ref 0.9–1.1)
INSULIN ADMINSTERED, INSADM: 0.5 UNITS/HOUR
INSULIN ADMINSTERED, INSADM: 0.7 UNITS/HOUR
INSULIN ADMINSTERED, INSADM: 0.7 UNITS/HOUR
INSULIN ADMINSTERED, INSADM: 1 UNITS/HOUR
INSULIN ADMINSTERED, INSADM: 1.1 UNITS/HOUR
INSULIN ADMINSTERED, INSADM: 1.2 UNITS/HOUR
INSULIN ADMINSTERED, INSADM: 1.7 UNITS/HOUR
INSULIN ADMINSTERED, INSADM: 2.6 UNITS/HOUR
INSULIN ADMINSTERED, INSADM: 3 UNITS/HOUR
INSULIN ADMINSTERED, INSADM: 4.1 UNITS/HOUR
INSULIN ADMINSTERED, INSADM: 4.8 UNITS/HOUR
INSULIN ADMINSTERED, INSADM: 5 UNITS/HOUR
INSULIN ORDER, INSORD: 0.5 UNITS/HOUR
INSULIN ORDER, INSORD: 0.7 UNITS/HOUR
INSULIN ORDER, INSORD: 0.7 UNITS/HOUR
INSULIN ORDER, INSORD: 1 UNITS/HOUR
INSULIN ORDER, INSORD: 1.1 UNITS/HOUR
INSULIN ORDER, INSORD: 1.2 UNITS/HOUR
INSULIN ORDER, INSORD: 1.7 UNITS/HOUR
INSULIN ORDER, INSORD: 2.6 UNITS/HOUR
INSULIN ORDER, INSORD: 3 UNITS/HOUR
INSULIN ORDER, INSORD: 4.1 UNITS/HOUR
INSULIN ORDER, INSORD: 4.8 UNITS/HOUR
INSULIN ORDER, INSORD: 5 UNITS/HOUR
LOW TARGET, LOT: 95 MG/DL
LYMPHOCYTES # BLD: 0.4 K/UL (ref 0.8–3.5)
LYMPHOCYTES NFR BLD: 5 % (ref 12–49)
MAGNESIUM SERPL-MCNC: 2.2 MG/DL (ref 1.6–2.4)
MAGNESIUM SERPL-MCNC: 2.2 MG/DL (ref 1.6–2.4)
MCH RBC QN AUTO: 29.9 PG (ref 26–34)
MCH RBC QN AUTO: 30.3 PG (ref 26–34)
MCHC RBC AUTO-ENTMCNC: 35.5 G/DL (ref 30–36.5)
MCHC RBC AUTO-ENTMCNC: 35.5 G/DL (ref 30–36.5)
MCV RBC AUTO: 84.2 FL (ref 80–99)
MCV RBC AUTO: 85.2 FL (ref 80–99)
METHGB MFR BLD: 0.3 % (ref 0–1.4)
MINUTES UNTIL NEXT BG, NBG: 120 MIN
MINUTES UNTIL NEXT BG, NBG: 60 MIN
MONOCYTES # BLD: 0.7 K/UL (ref 0–1)
MONOCYTES NFR BLD: 10 % (ref 5–13)
MULTIPLIER, MUL: 0.02
MULTIPLIER, MUL: 0.03
MULTIPLIER, MUL: 0.04
MULTIPLIER, MUL: 0.05
NEUTS SEG # BLD: 6.3 K/UL (ref 1.8–8)
NEUTS SEG NFR BLD: 85 % (ref 32–75)
NRBC # BLD: 0 K/UL (ref 0–0.01)
NRBC # BLD: 0 K/UL (ref 0–0.01)
NRBC BLD-RTO: 0 PER 100 WBC
NRBC BLD-RTO: 0 PER 100 WBC
ORDER INITIALS, ORDINIT: NORMAL
OXYHGB MFR BLD: 61 % (ref 94–97)
P-R INTERVAL, ECG05: 134 MS
PCO2 BLD: 42.3 MMHG (ref 35–45)
PCO2 BLD: 42.8 MMHG (ref 35–45)
PH BLD: 7.37 (ref 7.35–7.45)
PH BLD: 7.38 (ref 7.35–7.45)
PLATELET # BLD AUTO: 89 K/UL (ref 150–400)
PLATELET # BLD AUTO: 91 K/UL (ref 150–400)
PMV BLD AUTO: 11.5 FL (ref 8.9–12.9)
PMV BLD AUTO: 11.7 FL (ref 8.9–12.9)
PO2 BLD: 161 MMHG (ref 80–100)
PO2 BLD: 95 MMHG (ref 80–100)
POTASSIUM SERPL-SCNC: 4.9 MMOL/L (ref 3.5–5.1)
POTASSIUM SERPL-SCNC: 4.9 MMOL/L (ref 3.5–5.1)
PROT SERPL-MCNC: 5.2 G/DL (ref 6.4–8.2)
PROT SERPL-MCNC: 5.4 G/DL (ref 6.4–8.2)
PROTHROMBIN TIME: 12.3 SEC (ref 9–11.1)
Q-T INTERVAL, ECG07: 384 MS
QRS DURATION, ECG06: 82 MS
QTC CALCULATION (BEZET), ECG08: 360 MS
RBC # BLD AUTO: 2.84 M/UL (ref 4.1–5.7)
RBC # BLD AUTO: 2.91 M/UL (ref 4.1–5.7)
RBC MORPH BLD: ABNORMAL
SAO2 % BLD: 62 % (ref 95–99)
SAO2 % BLD: 97.2 % (ref 92–97)
SAO2 % BLD: 99.4 % (ref 92–97)
SERVICE CMNT-IMP: ABNORMAL
SERVICE CMNT-IMP: NORMAL
SODIUM SERPL-SCNC: 140 MMOL/L (ref 136–145)
SODIUM SERPL-SCNC: 140 MMOL/L (ref 136–145)
SPECIMEN SITE: ABNORMAL
SPECIMEN TYPE: ABNORMAL
SPECIMEN TYPE: ABNORMAL
STATUS OF UNIT,%ST: NORMAL
UNIT DIVISION, %UDIV: 0
VENTRICULAR RATE, ECG03: 53 BPM
WBC # BLD AUTO: 7.3 K/UL (ref 4.1–11.1)
WBC # BLD AUTO: 7.4 K/UL (ref 4.1–11.1)

## 2023-03-04 PROCEDURE — 74011000250 HC RX REV CODE- 250: Performed by: ANESTHESIOLOGY

## 2023-03-04 PROCEDURE — 74011250636 HC RX REV CODE- 250/636: Performed by: ANESTHESIOLOGY

## 2023-03-04 PROCEDURE — 85018 HEMOGLOBIN: CPT

## 2023-03-04 PROCEDURE — P9045 ALBUMIN (HUMAN), 5%, 250 ML: HCPCS | Performed by: PHYSICIAN ASSISTANT

## 2023-03-04 PROCEDURE — 74011250636 HC RX REV CODE- 250/636: Performed by: PHYSICIAN ASSISTANT

## 2023-03-04 PROCEDURE — 74011250636 HC RX REV CODE- 250/636: Performed by: NURSE PRACTITIONER

## 2023-03-04 PROCEDURE — 74011250637 HC RX REV CODE- 250/637: Performed by: PHYSICIAN ASSISTANT

## 2023-03-04 PROCEDURE — 80053 COMPREHEN METABOLIC PANEL: CPT

## 2023-03-04 PROCEDURE — 82962 GLUCOSE BLOOD TEST: CPT

## 2023-03-04 PROCEDURE — 85027 COMPLETE CBC AUTOMATED: CPT

## 2023-03-04 PROCEDURE — 74011000258 HC RX REV CODE- 258: Performed by: NURSE PRACTITIONER

## 2023-03-04 PROCEDURE — 74011250636 HC RX REV CODE- 250/636: Performed by: STUDENT IN AN ORGANIZED HEALTH CARE EDUCATION/TRAINING PROGRAM

## 2023-03-04 PROCEDURE — 74011636637 HC RX REV CODE- 636/637: Performed by: NURSE PRACTITIONER

## 2023-03-04 PROCEDURE — 83735 ASSAY OF MAGNESIUM: CPT

## 2023-03-04 PROCEDURE — 82803 BLOOD GASES ANY COMBINATION: CPT

## 2023-03-04 PROCEDURE — 93005 ELECTROCARDIOGRAM TRACING: CPT

## 2023-03-04 PROCEDURE — 74011000250 HC RX REV CODE- 250: Performed by: PHYSICIAN ASSISTANT

## 2023-03-04 PROCEDURE — 36415 COLL VENOUS BLD VENIPUNCTURE: CPT

## 2023-03-04 PROCEDURE — 71045 X-RAY EXAM CHEST 1 VIEW: CPT

## 2023-03-04 PROCEDURE — 82375 ASSAY CARBOXYHB QUANT: CPT

## 2023-03-04 PROCEDURE — 65610000003 HC RM ICU SURGICAL

## 2023-03-04 PROCEDURE — 94003 VENT MGMT INPAT SUBQ DAY: CPT

## 2023-03-04 RX ORDER — FENTANYL CITRATE 50 UG/ML
50 INJECTION, SOLUTION INTRAMUSCULAR; INTRAVENOUS
Status: DISCONTINUED | OUTPATIENT
Start: 2023-03-04 | End: 2023-03-09 | Stop reason: HOSPADM

## 2023-03-04 RX ADMIN — SODIUM CHLORIDE 1 UNITS/HR: 9 INJECTION, SOLUTION INTRAVENOUS at 08:01

## 2023-03-04 RX ADMIN — SODIUM CHLORIDE, PRESERVATIVE FREE 10 ML: 5 INJECTION INTRAVENOUS at 06:12

## 2023-03-04 RX ADMIN — SODIUM CHLORIDE, POTASSIUM CHLORIDE, SODIUM LACTATE AND CALCIUM CHLORIDE 100 ML/HR: 600; 310; 30; 20 INJECTION, SOLUTION INTRAVENOUS at 13:11

## 2023-03-04 RX ADMIN — OXYCODONE HYDROCHLORIDE 10 MG: 5 TABLET ORAL at 13:15

## 2023-03-04 RX ADMIN — CEFAZOLIN 2 G: 1 INJECTION, POWDER, FOR SOLUTION INTRAMUSCULAR; INTRAVENOUS at 20:06

## 2023-03-04 RX ADMIN — SENNOSIDES AND DOCUSATE SODIUM 1 TABLET: 50; 8.6 TABLET ORAL at 17:51

## 2023-03-04 RX ADMIN — CEFAZOLIN 2 G: 1 INJECTION, POWDER, FOR SOLUTION INTRAMUSCULAR; INTRAVENOUS at 08:55

## 2023-03-04 RX ADMIN — MUPIROCIN: 20 OINTMENT TOPICAL at 17:40

## 2023-03-04 RX ADMIN — DEXMEDETOMIDINE HYDROCHLORIDE 1.5 MCG/KG/HR: 4 INJECTION, SOLUTION INTRAVENOUS at 01:11

## 2023-03-04 RX ADMIN — FAMOTIDINE 20 MG: 10 INJECTION, SOLUTION INTRAVENOUS at 08:55

## 2023-03-04 RX ADMIN — SODIUM CHLORIDE, PRESERVATIVE FREE 10 ML: 5 INJECTION INTRAVENOUS at 21:26

## 2023-03-04 RX ADMIN — EPINEPHRINE 2 MCG/MIN: 1 INJECTION INTRAMUSCULAR; INTRAVENOUS; SUBCUTANEOUS at 07:20

## 2023-03-04 RX ADMIN — CEFAZOLIN 2 G: 1 INJECTION, POWDER, FOR SOLUTION INTRAMUSCULAR; INTRAVENOUS at 02:12

## 2023-03-04 RX ADMIN — ALBUMIN (HUMAN) 12.5 G: 12.5 INJECTION, SOLUTION INTRAVENOUS at 21:23

## 2023-03-04 RX ADMIN — ASPIRIN 81 MG CHEWABLE TABLET 81 MG: 81 TABLET CHEWABLE at 13:15

## 2023-03-04 RX ADMIN — ACETAMINOPHEN 975 MG: 325 TABLET ORAL at 23:46

## 2023-03-04 RX ADMIN — ACETAMINOPHEN 975 MG: 325 TABLET ORAL at 13:15

## 2023-03-04 RX ADMIN — ALBUMIN (HUMAN) 12.5 G: 12.5 INJECTION, SOLUTION INTRAVENOUS at 13:51

## 2023-03-04 RX ADMIN — DEXMEDETOMIDINE HYDROCHLORIDE 1.5 MCG/KG/HR: 4 INJECTION, SOLUTION INTRAVENOUS at 06:10

## 2023-03-04 RX ADMIN — CHLORHEXIDINE GLUCONATE 10 ML: 1.2 RINSE ORAL at 08:49

## 2023-03-04 RX ADMIN — DEXMEDETOMIDINE HYDROCHLORIDE 0.5 MCG/KG/HR: 4 INJECTION, SOLUTION INTRAVENOUS at 09:35

## 2023-03-04 RX ADMIN — PHENYLEPHRINE HYDROCHLORIDE 30 MCG/MIN: 10 INJECTION INTRAVENOUS at 23:48

## 2023-03-04 RX ADMIN — ACETAMINOPHEN 975 MG: 325 TABLET ORAL at 00:01

## 2023-03-04 RX ADMIN — MUPIROCIN: 20 OINTMENT TOPICAL at 08:49

## 2023-03-04 RX ADMIN — ACETAMINOPHEN 975 MG: 325 TABLET ORAL at 06:11

## 2023-03-04 RX ADMIN — CHLORHEXIDINE GLUCONATE 10 ML: 1.2 RINSE ORAL at 20:45

## 2023-03-04 RX ADMIN — DEXMEDETOMIDINE HYDROCHLORIDE 0.3 MCG/KG/HR: 4 INJECTION, SOLUTION INTRAVENOUS at 21:25

## 2023-03-04 RX ADMIN — MIDAZOLAM 1 MG: 1 INJECTION INTRAMUSCULAR; INTRAVENOUS at 02:44

## 2023-03-04 RX ADMIN — FAMOTIDINE 20 MG: 20 TABLET ORAL at 20:36

## 2023-03-04 RX ADMIN — SODIUM CHLORIDE, PRESERVATIVE FREE 10 ML: 5 INJECTION INTRAVENOUS at 13:15

## 2023-03-04 RX ADMIN — Medication 400 MG: at 17:52

## 2023-03-04 RX ADMIN — CEFAZOLIN 2 G: 1 INJECTION, POWDER, FOR SOLUTION INTRAMUSCULAR; INTRAVENOUS at 13:39

## 2023-03-04 RX ADMIN — FENTANYL CITRATE 150 MCG/HR: 0.05 INJECTION, SOLUTION INTRAMUSCULAR; INTRAVENOUS at 03:30

## 2023-03-04 RX ADMIN — DEXMEDETOMIDINE HYDROCHLORIDE 1.5 MCG/KG/HR: 4 INJECTION, SOLUTION INTRAVENOUS at 04:07

## 2023-03-04 RX ADMIN — ACETAMINOPHEN 975 MG: 325 TABLET ORAL at 17:52

## 2023-03-04 NOTE — CONSULTS
[facility]    ICU TEAM CONSULTANT Note    Name: Oneil Clark   : 1945   MRN: [id number]   Date: 3/4/2023      Assessment and Plan:     Briefly,    ICU Problems:  Requirement of mechanical ventilation post surgical procedure/hypercapnic respiratory failure:  Did not tolerate PS trial this am with apnea and hypoventilation. Likely 2/2 sedation. Will wean sedation and attempt again. ON minimal settings SIMV. Cardiogenic shock:  Management by CTS. Likely routine post CPB stunning. Inotropic support to maintain CI. Suspect will be able to wean off once extubated. Hypovolemic shock:  Management by CTS. Improved. Bleeding stopped overnight. Transfusion this am.    Acute blood loss anemia:  management by CTS. CT output has been high overnight but slowed. Acute coagulopathy suspected etiology. Acute coagulopathy:  Resolved Management by CTS. POD:  Day of Surgery    S/P:   Procedure(s):  CORONARY ARTERY BYPASS GRAFTING X 3 WITH LIMA AND KIRK,AORTIC VALVE REPLACEMENT, ECC, MATTHIAS AND EPIAORTIC U/S BY DR ROWELL [facility]    Active Problem List:     Problem List  Date Reviewed: 2023            Codes Class    S/P AVR ICD-10-CM: Z95.2  ICD-9-CM: V43.3         * (Principal) NSTEMI (non-ST elevated myocardial infarction) ([facility] [address] ICD-10-CM: I21.4  ICD-9-CM: 410.70         Chest pain ICD-10-CM: R07.9  ICD-9-CM: 786.50          Past Medical History:      has a past medical history of Hypercholesterolemia and Hypertension. Past Surgical History:      has no past surgical history on file. Home Medications:     Prior to Admission medications    Medication Sig Start Date End Date Taking? Authorizing Provider   rosuvastatin (CRESTOR) 10 mg tablet Take 10 mg by mouth nightly. Yes Other, MD Jana   ezetimibe (ZETIA) 10 mg tablet Take 10 mg by mouth. Yes Other, MD Jana   telmisartan (MICARDIS) 20 mg tablet Take 20 mg by mouth daily.    Yes Other, MD Jana       Allergies/Social/Family History:     No Known Allergies   Social History     Tobacco Use    Smoking status: Former     Types: Cigarettes    Smokeless tobacco: Never   Substance Use Topics    Alcohol use: Not Currently      Family History   Problem Relation Age of Onset    Heart Disease Father          Objective:   Vital Signs:  Visit Vitals  /71   Pulse 80   Temp 97.7 °F (36.5 °C)   Resp 24   Ht 5' 11\" (1.803 m)   Wt 113.5 kg (250 lb 3.6 oz)   SpO2 97%   BMI 34.90 kg/m²    O2 Flow Rate (L/min): 2 l/min O2 Device: Endotracheal tube, Ventilator Temp (24hrs), Av °F (36.7 °C), Min:96.3 °F (35.7 °C), Max:99.4 °F (37.4 °C)    CVP (mmHg): 9 mmHg (23 0900)      Intake/Output:     Intake/Output Summary (Last 24 hours) at 3/4/2023 0935  Last data filed at 3/4/2023 0900  Gross per 24 hour   Intake 7667.49 ml   Output 4713 ml   Net 2954.49 ml         Physical Exam:    Gen:  NAD  Neuro:  Intubated and sedated but following commands  Resp clear, low TV on PS trial  CV paced  Abd soft  Extremities no edema    LABS AND  DATA: Personally reviewed  Recent Labs     23  2320   WBC 7.3 7.4   HGB 8.7* 8.6*   HCT 24.5* 24.2*   PLT 91* 89*       Recent Labs     23  0410 23  2320    140   K 4.9 4.9    109*   CO2 26 27   BUN 17 16   CREA 0.90 0.86   * 110*   CA 8.5 8.1*   MG 2.2 2.2       Recent Labs     23  0410 23  2320   AP 35* 36*   TP 5.4* 5.2*   ALB 3.2* 3.2*   GLOB 2.2 2.0       Recent Labs     23  2320 23  1732 23  1517   INR 1.2* 1.3* 1.5*   PTP 12.3* 13.4* 15.0*   APTT  --   --  30.7        Recent Labs     23  0623 23  1528   PHI 7.37 7.36   PCO2I 42.8 45.8*   PO2I 161* 109*     No results for input(s): CPK, CKMB, TROIQ, BNPP in the last 72 hours.     Hemodynamics:   PAP: PAP Systolic: 29 ( 4377) CO: CO (l/min): 5.2 l/min (23 09)   Wedge:   CI: CI (l/min/m2): 2.3 l/min/m2 (23 09)   CVP:  CVP (mmHg): 9 mmHg (23 09) SVR:       PVR:       Ventilator Settings:  Mode Rate Tidal Volume Pressure FiO2 PEEP   Assist control, Volume control   450 ml  5 cm H2O 40 % 5 cm H20     Peak airway pressure: 23 cm H2O    Minute ventilation: 9.53 l/min        MEDS: Reviewed    Chest X-Ray:  CXR Results  (Last 48 hours)                 03/04/23 0509  XR CHEST PORT Final result    Impression:      Stable radiographic appearance. Narrative:  EXAM:  XR CHEST PORT       INDICATION: Postop heart       COMPARISON: 3/3/2023       TECHNIQUE: 0444 hours portable chest AP view       FINDINGS: Median sternotomy wires are again shown. Positions of the endotracheal   tube, transesophageal tube, pulmonary arterial line, mediastinal and left chest   tubes are unchanged. Cardiomediastinal contours are stable. Mild patchy   opacification at the left lung base is unchanged, consistent assessment of   atelectasis. Lungs are otherwise clear. No pneumothorax or pleural effusion is demonstrated. The visualized bones and   upper abdomen are age-appropriate. 03/03/23 1531  XR CHEST PORT Final result    Impression:  Status post sternotomy. Typical post sternotomy changes. Narrative:  Clinical history: postop heart   INDICATION:   postop heart   COMPARISON: 2/24/2023       FINDINGS:   AP portable upright view of the chest demonstrates a status post sternotomy with   enlarged cardiopericardial silhouette. Small right-sided effusion. ET tube, NG   tube and pulmonary artery catheters are within normal limits. Mild increased   interstitial opacity. .Pleural drainage catheter in place. . Patient is on a   cardiac monitor. CRITICAL CARE CONSULTANT NOTE  I had a face to face encounter with the patient, reviewed and interpreted patient data including clinical events, labs, images, vital signs, I/O's, and examined patient.   I have discussed the case and the plan and management of the patient's care with the consulting services, the bedside nurses and the respiratory therapist.      NOTE OF PERSONAL INVOLVEMENT IN CARE   This patient has a high probability of imminent, clinically significant deterioration, which requires the highest level of preparedness to intervene urgently. I participated in the decision-making and personally managed or directed the management of the following life and organ supporting interventions that required my frequent assessment to treat or prevent imminent deterioration. I personally spent 40 minutes of critical care time. This is time spent at this critically ill patient's bedside actively involved in patient care as well as the coordination of care and discussions with the patient's family. This does not include any procedural time which has been billed separately.       [address] [facility]

## 2023-03-04 NOTE — PROGRESS NOTES
0800 Bedside and Verbal shift change report given to Boogie De La Cruz [facility] (oncoming nurse) by Lamine Nevarez RN (offgoing nurse). Report included the following information SBAR, Kardex, OR Summary, Procedure Summary, Intake/Output, MAR, Recent Results, and Cardiac Rhythm A paced . Appropriateness and documentation of restraints has been reviewed with the off-going RN. Restraint order is current and valid. 6310 VENT adjustments: FiO2 decreased to 40%, Peep decreased to 5. Dex decreased from 1.5 to 1 mcg.      3432 Dr Tatyana Lozano at the bedside. Plan: wean sedation and extubate. Vent was adjusted to SIMV 16 /400 /40% peep 5/ PS 5.    0908 Fentanyl off.     0930 Family at the bedside. 7914 SBT started. 1021 ABG : 7.376 /42.3 /95.1 /24.8 /-0.4 /97 %    1033 Pt was extubated to 4 L/NC    1310 Dr Alamo at the bedside. Plan: continue with drips, give Albumin. 1600 Pt is up in the chair. 1828 Epi decreased to 1 mcg. 1840 Pt is back to bed. 1846 Hgb 7.6 Hct 21.5 noted. Intensivist notified. No new orders received. 1935 Epi off.     2000 Bedside and Verbal shift change report given to Lamine Nevarez RN (oncoming nurse) by Boogie De La Cruz RN (offgoing nurse). Report included the following information SBAR, Kardex, OR Summary, Procedure Summary, Intake/Output, MAR, Recent Results, and Cardiac Rhythm A paced .

## 2023-03-04 NOTE — PROGRESS NOTES
Improved today after AVR CABG yesterday. Hemostasis improved. Remains on low-dose epinephrine and norepinephrine. We will give some albumin. We will follow renal function closely. Cautiously optimistic that he will do well.

## 2023-03-04 NOTE — PROGRESS NOTES
Physical Therapy:  3/4/2023    Orders received and chart reviewed in preparation for PT evaluation. Noted patient currently intubated and sedated at this time, POD # 1 s/p CABG x3. Will hold and continue to follow for PT evaluation as able following extubation.      Thank you,  Unruly Lacey, PT, DPT

## 2023-03-04 NOTE — PROGRESS NOTES
Bedside and Verbal shift change report given to 1906 Sathya Juarez (oncoming nurse) by Rosalba Davis (offgoing nurse). Report included the following information SBAR, OR Summary, Procedure Summary, Intake/Output, Cardiac Rhythm APaced, and Alarm Parameters . Appropriateness and documentation of restraints has been reviewed with the off-going RN. Restraint order is current and valid. 2132 Dr. Shauna Tse (intensivist) at bedside for updates. Vent mode changed from SIMV to AC/VC    0230 Dr. Shauna Tse notified of critical nephrocheck result, no new orders at this time. 1411 [facility] [address] awake, attempted to sit up, elevated legs suddenly. RN staff to bedside, Pt followed commands, denied pain and endorsed fear with head shakes/nods. 1 mg versed given, Pt repositioned in bed. Brief drop in blood pressure, improved once Pt calmed. No change in CI/CO/SvO2.    0601 Dr. Aiden Dash (intensivist) at bedside, updated on Pt status and labs. FiO2 decreased from 80 to 60%    Bedside and Verbal shift change report given to Rosalba Davis (oncoming nurse) by 1906 Sathya Juarez (offgoing nurse). Report included the following information SBAR, Recent Results, and Cardiac Rhythm APaced .

## 2023-03-04 NOTE — PROGRESS NOTES
Occupational Therapy:  3/4/2023    Orders received and chart reviewed in preparation for OT evaluation. Noted patient currently intubated and sedated at this time, POD # 1 s/p CABG x3. Will hold and continue to follow for OT evaluation as able following extubation.      Thank you,   Mandie Trammell, OTD, OTR/L

## 2023-03-05 ENCOUNTER — APPOINTMENT (OUTPATIENT)
Dept: GENERAL RADIOLOGY | Age: 78
DRG: 219 | End: 2023-03-05
Attending: PHYSICIAN ASSISTANT
Payer: MEDICARE

## 2023-03-05 LAB
ADMINISTERED INITIALS, ADMINIT: NORMAL
ALBUMIN SERPL-MCNC: 3 G/DL (ref 3.5–5)
ALBUMIN/GLOB SERPL: 1.3 (ref 1.1–2.2)
ALP SERPL-CCNC: 40 U/L (ref 45–117)
ALT SERPL-CCNC: 22 U/L (ref 12–78)
ANION GAP SERPL CALC-SCNC: 4 MMOL/L (ref 5–15)
AST SERPL-CCNC: 75 U/L (ref 15–37)
BDY SITE: ABNORMAL
BILIRUB SERPL-MCNC: 0.4 MG/DL (ref 0.2–1)
BUN SERPL-MCNC: 20 MG/DL (ref 6–20)
BUN/CREAT SERPL: 29 (ref 12–20)
CALCIUM SERPL-MCNC: 8.3 MG/DL (ref 8.5–10.1)
CHLORIDE SERPL-SCNC: 104 MMOL/L (ref 97–108)
CO2 SERPL-SCNC: 28 MMOL/L (ref 21–32)
COHGB MFR BLD: 1.6 % (ref 1–2)
CREAT SERPL-MCNC: 0.69 MG/DL (ref 0.7–1.3)
D50 ADMINISTERED, D50ADM: 0 ML
D50 ORDER, D50ORD: 0 ML
ERYTHROCYTE [DISTWIDTH] IN BLOOD BY AUTOMATED COUNT: 13.8 % (ref 11.5–14.5)
GLOBULIN SER CALC-MCNC: 2.3 G/DL (ref 2–4)
GLUCOSE BLD STRIP.AUTO-MCNC: 101 MG/DL (ref 65–117)
GLUCOSE BLD STRIP.AUTO-MCNC: 102 MG/DL (ref 65–117)
GLUCOSE BLD STRIP.AUTO-MCNC: 103 MG/DL (ref 65–117)
GLUCOSE BLD STRIP.AUTO-MCNC: 104 MG/DL (ref 65–117)
GLUCOSE BLD STRIP.AUTO-MCNC: 105 MG/DL (ref 65–117)
GLUCOSE BLD STRIP.AUTO-MCNC: 107 MG/DL (ref 65–117)
GLUCOSE BLD STRIP.AUTO-MCNC: 111 MG/DL (ref 65–117)
GLUCOSE BLD STRIP.AUTO-MCNC: 112 MG/DL (ref 65–117)
GLUCOSE BLD STRIP.AUTO-MCNC: 113 MG/DL (ref 65–117)
GLUCOSE BLD STRIP.AUTO-MCNC: 116 MG/DL (ref 65–117)
GLUCOSE BLD STRIP.AUTO-MCNC: 117 MG/DL (ref 65–117)
GLUCOSE BLD STRIP.AUTO-MCNC: 129 MG/DL (ref 65–117)
GLUCOSE BLD STRIP.AUTO-MCNC: 137 MG/DL (ref 65–117)
GLUCOSE BLD STRIP.AUTO-MCNC: 140 MG/DL (ref 65–117)
GLUCOSE BLD STRIP.AUTO-MCNC: 147 MG/DL (ref 65–117)
GLUCOSE BLD STRIP.AUTO-MCNC: 88 MG/DL (ref 65–117)
GLUCOSE BLD STRIP.AUTO-MCNC: 95 MG/DL (ref 65–117)
GLUCOSE SERPL-MCNC: 111 MG/DL (ref 65–100)
GLUCOSE, GLC: 101 MG/DL
GLUCOSE, GLC: 102 MG/DL
GLUCOSE, GLC: 103 MG/DL
GLUCOSE, GLC: 104 MG/DL
GLUCOSE, GLC: 107 MG/DL
GLUCOSE, GLC: 112 MG/DL
GLUCOSE, GLC: 113 MG/DL
GLUCOSE, GLC: 116 MG/DL
GLUCOSE, GLC: 117 MG/DL
GLUCOSE, GLC: 129 MG/DL
GLUCOSE, GLC: 137 MG/DL
GLUCOSE, GLC: 140 MG/DL
GLUCOSE, GLC: 147 MG/DL
GLUCOSE, GLC: 88 MG/DL
GLUCOSE, GLC: 95 MG/DL
HCT VFR BLD AUTO: 21 % (ref 36.6–50.3)
HGB BLD-MCNC: 6.9 G/DL (ref 12.1–17)
HIGH TARGET, HITG: 130 MG/DL
HISTORY CHECKED?,CKHIST: NORMAL
INSULIN ADMINSTERED, INSADM: 1.1 UNITS/HOUR
INSULIN ADMINSTERED, INSADM: 1.6 UNITS/HOUR
INSULIN ADMINSTERED, INSADM: 1.7 UNITS/HOUR
INSULIN ADMINSTERED, INSADM: 1.8 UNITS/HOUR
INSULIN ADMINSTERED, INSADM: 2 UNITS/HOUR
INSULIN ADMINSTERED, INSADM: 2 UNITS/HOUR
INSULIN ADMINSTERED, INSADM: 2.1 UNITS/HOUR
INSULIN ADMINSTERED, INSADM: 2.1 UNITS/HOUR
INSULIN ADMINSTERED, INSADM: 2.2 UNITS/HOUR
INSULIN ADMINSTERED, INSADM: 2.5 UNITS/HOUR
INSULIN ADMINSTERED, INSADM: 2.5 UNITS/HOUR
INSULIN ADMINSTERED, INSADM: 3 UNITS/HOUR
INSULIN ADMINSTERED, INSADM: 3.7 UNITS/HOUR
INSULIN ADMINSTERED, INSADM: 4 UNITS/HOUR
INSULIN ADMINSTERED, INSADM: 5 UNITS/HOUR
INSULIN ORDER, INSORD: 1.1 UNITS/HOUR
INSULIN ORDER, INSORD: 1.6 UNITS/HOUR
INSULIN ORDER, INSORD: 1.7 UNITS/HOUR
INSULIN ORDER, INSORD: 1.8 UNITS/HOUR
INSULIN ORDER, INSORD: 2 UNITS/HOUR
INSULIN ORDER, INSORD: 2 UNITS/HOUR
INSULIN ORDER, INSORD: 2.1 UNITS/HOUR
INSULIN ORDER, INSORD: 2.1 UNITS/HOUR
INSULIN ORDER, INSORD: 2.2 UNITS/HOUR
INSULIN ORDER, INSORD: 2.5 UNITS/HOUR
INSULIN ORDER, INSORD: 2.5 UNITS/HOUR
INSULIN ORDER, INSORD: 3 UNITS/HOUR
INSULIN ORDER, INSORD: 3.7 UNITS/HOUR
INSULIN ORDER, INSORD: 4 UNITS/HOUR
INSULIN ORDER, INSORD: 5 UNITS/HOUR
LOW TARGET, LOT: 95 MG/DL
MAGNESIUM SERPL-MCNC: 2.2 MG/DL (ref 1.6–2.4)
MCH RBC QN AUTO: 29.5 PG (ref 26–34)
MCHC RBC AUTO-ENTMCNC: 32.9 G/DL (ref 30–36.5)
MCV RBC AUTO: 89.7 FL (ref 80–99)
METHGB MFR BLD: 0.4 % (ref 0–1.4)
MINUTES UNTIL NEXT BG, NBG: 120 MIN
MINUTES UNTIL NEXT BG, NBG: 60 MIN
MULTIPLIER, MUL: 0.04
MULTIPLIER, MUL: 0.05
MULTIPLIER, MUL: 0.06
NRBC # BLD: 0.02 K/UL (ref 0–0.01)
NRBC BLD-RTO: 0.3 PER 100 WBC
ORDER INITIALS, ORDINIT: NORMAL
OXYHGB MFR BLD: 59.2 % (ref 94–97)
PLATELET # BLD AUTO: 65 K/UL (ref 150–400)
PMV BLD AUTO: 11.5 FL (ref 8.9–12.9)
POTASSIUM SERPL-SCNC: 3.9 MMOL/L (ref 3.5–5.1)
PROT SERPL-MCNC: 5.3 G/DL (ref 6.4–8.2)
RBC # BLD AUTO: 2.34 M/UL (ref 4.1–5.7)
SAO2 % BLD: 60 % (ref 95–99)
SERVICE CMNT-IMP: ABNORMAL
SERVICE CMNT-IMP: NORMAL
SODIUM SERPL-SCNC: 136 MMOL/L (ref 136–145)
SPECIMEN SITE: ABNORMAL
WBC # BLD AUTO: 7.1 K/UL (ref 4.1–11.1)

## 2023-03-05 PROCEDURE — 71045 X-RAY EXAM CHEST 1 VIEW: CPT

## 2023-03-05 PROCEDURE — 97165 OT EVAL LOW COMPLEX 30 MIN: CPT

## 2023-03-05 PROCEDURE — 74011000250 HC RX REV CODE- 250: Performed by: PHYSICIAN ASSISTANT

## 2023-03-05 PROCEDURE — 74011250637 HC RX REV CODE- 250/637: Performed by: PHYSICIAN ASSISTANT

## 2023-03-05 PROCEDURE — 74011250636 HC RX REV CODE- 250/636: Performed by: STUDENT IN AN ORGANIZED HEALTH CARE EDUCATION/TRAINING PROGRAM

## 2023-03-05 PROCEDURE — 74011000258 HC RX REV CODE- 258: Performed by: NURSE PRACTITIONER

## 2023-03-05 PROCEDURE — 83735 ASSAY OF MAGNESIUM: CPT

## 2023-03-05 PROCEDURE — 74011636637 HC RX REV CODE- 636/637: Performed by: PHYSICIAN ASSISTANT

## 2023-03-05 PROCEDURE — 97535 SELF CARE MNGMENT TRAINING: CPT

## 2023-03-05 PROCEDURE — 74011250637 HC RX REV CODE- 250/637: Performed by: STUDENT IN AN ORGANIZED HEALTH CARE EDUCATION/TRAINING PROGRAM

## 2023-03-05 PROCEDURE — 97530 THERAPEUTIC ACTIVITIES: CPT

## 2023-03-05 PROCEDURE — 80053 COMPREHEN METABOLIC PANEL: CPT

## 2023-03-05 PROCEDURE — 85027 COMPLETE CBC AUTOMATED: CPT

## 2023-03-05 PROCEDURE — 74011250636 HC RX REV CODE- 250/636: Performed by: NURSE PRACTITIONER

## 2023-03-05 PROCEDURE — 74011636637 HC RX REV CODE- 636/637: Performed by: NURSE PRACTITIONER

## 2023-03-05 PROCEDURE — 74011000250 HC RX REV CODE- 250: Performed by: ANESTHESIOLOGY

## 2023-03-05 PROCEDURE — 74011250636 HC RX REV CODE- 250/636: Performed by: PHYSICIAN ASSISTANT

## 2023-03-05 PROCEDURE — P9016 RBC LEUKOCYTES REDUCED: HCPCS

## 2023-03-05 PROCEDURE — 97162 PT EVAL MOD COMPLEX 30 MIN: CPT

## 2023-03-05 PROCEDURE — 77030012390 HC DRN CHST BTL GTNG -B

## 2023-03-05 PROCEDURE — 65610000003 HC RM ICU SURGICAL

## 2023-03-05 PROCEDURE — 36430 TRANSFUSION BLD/BLD COMPNT: CPT

## 2023-03-05 PROCEDURE — 36415 COLL VENOUS BLD VENIPUNCTURE: CPT

## 2023-03-05 PROCEDURE — 82375 ASSAY CARBOXYHB QUANT: CPT

## 2023-03-05 PROCEDURE — 82962 GLUCOSE BLOOD TEST: CPT

## 2023-03-05 PROCEDURE — 74011250636 HC RX REV CODE- 250/636: Performed by: THORACIC SURGERY (CARDIOTHORACIC VASCULAR SURGERY)

## 2023-03-05 PROCEDURE — 74011250636 HC RX REV CODE- 250/636: Performed by: ANESTHESIOLOGY

## 2023-03-05 RX ORDER — ACETAMINOPHEN 325 MG/1
650 TABLET ORAL
Status: DISCONTINUED | OUTPATIENT
Start: 2023-03-05 | End: 2023-03-09 | Stop reason: HOSPADM

## 2023-03-05 RX ORDER — LANOLIN ALCOHOL/MO/W.PET/CERES
3 CREAM (GRAM) TOPICAL
Status: DISCONTINUED | OUTPATIENT
Start: 2023-03-05 | End: 2023-03-09 | Stop reason: HOSPADM

## 2023-03-05 RX ORDER — POTASSIUM CHLORIDE 29.8 MG/ML
20 INJECTION INTRAVENOUS ONCE
Status: COMPLETED | OUTPATIENT
Start: 2023-03-05 | End: 2023-03-05

## 2023-03-05 RX ADMIN — SENNOSIDES AND DOCUSATE SODIUM 1 TABLET: 50; 8.6 TABLET ORAL at 17:16

## 2023-03-05 RX ADMIN — CHLORHEXIDINE GLUCONATE 10 ML: 1.2 RINSE ORAL at 20:39

## 2023-03-05 RX ADMIN — Medication 400 MG: at 09:15

## 2023-03-05 RX ADMIN — CEFAZOLIN 2 G: 1 INJECTION, POWDER, FOR SOLUTION INTRAMUSCULAR; INTRAVENOUS at 02:19

## 2023-03-05 RX ADMIN — SODIUM CHLORIDE, PRESERVATIVE FREE 10 ML: 5 INJECTION INTRAVENOUS at 06:08

## 2023-03-05 RX ADMIN — SENNOSIDES AND DOCUSATE SODIUM 1 TABLET: 50; 8.6 TABLET ORAL at 09:15

## 2023-03-05 RX ADMIN — MUPIROCIN: 20 OINTMENT TOPICAL at 17:16

## 2023-03-05 RX ADMIN — CHLORHEXIDINE GLUCONATE 10 ML: 1.2 RINSE ORAL at 09:16

## 2023-03-05 RX ADMIN — SODIUM CHLORIDE, PRESERVATIVE FREE 10 ML: 5 INJECTION INTRAVENOUS at 14:35

## 2023-03-05 RX ADMIN — INSULIN GLARGINE 18 UNITS: 100 INJECTION, SOLUTION SUBCUTANEOUS at 14:39

## 2023-03-05 RX ADMIN — EPINEPHRINE 1 MCG/MIN: 1 INJECTION INTRAMUSCULAR; INTRAVENOUS; SUBCUTANEOUS at 07:10

## 2023-03-05 RX ADMIN — Medication 3 MG: at 21:43

## 2023-03-05 RX ADMIN — SODIUM CHLORIDE, POTASSIUM CHLORIDE, SODIUM LACTATE AND CALCIUM CHLORIDE 100 ML/HR: 600; 310; 30; 20 INJECTION, SOLUTION INTRAVENOUS at 09:47

## 2023-03-05 RX ADMIN — OXYCODONE HYDROCHLORIDE 10 MG: 5 TABLET ORAL at 09:15

## 2023-03-05 RX ADMIN — ASPIRIN 81 MG CHEWABLE TABLET 81 MG: 81 TABLET CHEWABLE at 09:15

## 2023-03-05 RX ADMIN — FAMOTIDINE 20 MG: 20 TABLET ORAL at 09:15

## 2023-03-05 RX ADMIN — POTASSIUM CHLORIDE 20 MEQ: 29.8 INJECTION, SOLUTION INTRAVENOUS at 06:23

## 2023-03-05 RX ADMIN — OXYCODONE HYDROCHLORIDE 5 MG: 5 TABLET ORAL at 17:15

## 2023-03-05 RX ADMIN — MUPIROCIN: 20 OINTMENT TOPICAL at 09:16

## 2023-03-05 RX ADMIN — SODIUM CHLORIDE 2.2 UNITS/HR: 9 INJECTION, SOLUTION INTRAVENOUS at 07:50

## 2023-03-05 RX ADMIN — POLYETHYLENE GLYCOL 3350 17 G: 17 POWDER, FOR SOLUTION ORAL at 09:15

## 2023-03-05 RX ADMIN — PHENYLEPHRINE HYDROCHLORIDE 20 MCG/MIN: 10 INJECTION INTRAVENOUS at 12:52

## 2023-03-05 RX ADMIN — FAMOTIDINE 20 MG: 20 TABLET ORAL at 20:36

## 2023-03-05 RX ADMIN — Medication 400 MG: at 17:16

## 2023-03-05 RX ADMIN — ACETAMINOPHEN 650 MG: 325 TABLET ORAL at 17:15

## 2023-03-05 RX ADMIN — ACETAMINOPHEN 650 MG: 325 TABLET ORAL at 21:44

## 2023-03-05 RX ADMIN — OXYCODONE HYDROCHLORIDE 5 MG: 5 TABLET ORAL at 21:43

## 2023-03-05 RX ADMIN — OXYCODONE HYDROCHLORIDE 5 MG: 5 TABLET ORAL at 05:28

## 2023-03-05 NOTE — PROGRESS NOTES
0800 Bedside and Verbal shift change report given to Ed Katz [facility] (oncoming nurse) by Orestes Engel RN (offgoing nurse). Report included the following information SBAR, Kardex, OR Summary, Procedure Summary, Intake/Output, MAR, Recent Results, and Cardiac Rhythm A paced . 6593 Dr Alamo at the bedside. 0900 Blood transfusion completed. No reactions suspected. 1030 PT at the bedside. 1200 OT at the bedside. 1400 Pt is back to bed. 1436 Epi stopped. 1538 LR infusion stopped. 1654 Insulin drip stopped. Cam drip stopped. Discussed elevated PA pressures and CVP. No new orders received at this time. 2000 Bedside shift change report given to Orestes Engel RN (oncoming nurse) by Jt Augustine (offgoing nurse). Report included the following information SBAR, Kardex, OR Summary, Procedure Summary, Intake/Output, MAR, Recent Results, Cardiac Rhythm NSR, and Alarm Parameters .

## 2023-03-05 NOTE — PROGRESS NOTES
CRITICAL CARE NOTE      Name: Sita Quigley   : 1945   MRN: [id number]   Date: 3/5/2023      REASON FOR ICU ADMISSION:  post CABG     PRINCIPAL ICU DIAGNOSIS   Multivessel CAD s/p CABG  Mixed shock  Acute blood loss anemia  NSTEMI    BRIEF PATIENT SUMMARY   Patient admitted from OSH for NSTEMI w/ LHC showing multivessel CAD now s/p planned  CABG x3    COMPREHENSIVE ASSESSMENT & PLAN:SYSTEM BASED     24 HOUR EVENTS: No acute overnight events, sitting in bedside chair this AM. AAI Paced to 80, NSR in 60s when off. Remained on low dose epi and diego this AM.     NEUROLOGICAL:   Mentation intact  PRN pain regimen    PULMONOLOGY:   O2 per NC PRN for spO2 >92%  IS, PFV, OOBTC    CARDIOVASCULAR:   Wean epi, diego as tolerated  MAP >65, SBP <130  GDMT as tolerated once off pressor support  Chest tube and AV wire management per CTS  Recommend PAC d/c    GASTROINTESTINAL   Cardiac diet  Pepcid     RENAL/ELECTROLYTE/FLUIDS:   Strict I.Os  Net positive >2L past 24hrs  Goal euvolemia  Monitor UOP  MG/Phos/K >2/3/4    ENDOCRINE:   Insulin per protocol  Glucose goal 120-180    HEMATOLOGY/ONCOLOGY:   SCDs    INFECTIOUS DISEASE:   Periop ancef, no acute issues    ICU DAILY CHECKLIST     Code Status:full  DVT Prophylaxis:SCDs  T/L/D: PIV, jenkins, PAC, AV wires, CT x3  SUP: pepcid  Diet: ADAT  Activity Level:ad duane  ABCDEF Bundle/Checklist Completed:Yes  Disposition: Stay in ICU  Multidisciplinary Rounds Completed:  Yes  Goals of Care Discussion/Palliative: Yes  Patient/Family Updated: Yes      SUBJECTIVE   ROS 10 point ROS reviewed and negative except as noted above    OBJECTIVE     Labs and Data: Reviewed 23  Medications: Reviewed 23  Imaging: Reviewed 23    Physical Exam  Vitals and nursing note reviewed. Constitutional:       General: He is not in acute distress. Appearance: Normal appearance. HENT:      Head: Normocephalic and atraumatic. Nose: Nose normal. No congestion.    Eyes: General: No scleral icterus. Conjunctiva/sclera: Conjunctivae normal.      Pupils: Pupils are equal, round, and reactive to light. Neck:      Comments: [facility] PAC  Cardiovascular:      Rate and Rhythm: Normal rate and regular rhythm. Pulses: Normal pulses. Heart sounds: Normal heart sounds. No murmur heard. No friction rub. No gallop. Comments: Sternotomy, blakex3  Pulmonary:      Effort: Pulmonary effort is normal. No respiratory distress. Breath sounds: Normal breath sounds. No wheezing or rales. Abdominal:      General: Abdomen is flat. Bowel sounds are normal. There is no distension. Palpations: Abdomen is soft. Tenderness: There is no abdominal tenderness. There is no guarding. Musculoskeletal:      Cervical back: Normal range of motion and neck supple. No rigidity. Right lower leg: No edema. Left lower leg: No edema. Skin:     General: Skin is warm and dry. Capillary Refill: Capillary refill takes less than 2 seconds. Coloration: Skin is not jaundiced. Neurological:      General: No focal deficit present. Mental Status: He is alert and oriented to person, place, and time. Mental status is at baseline. Cranial Nerves: No cranial nerve deficit.    Psychiatric:         Mood and Affect: Mood normal.         Behavior: Behavior normal.        Visit Vitals  BP (!) 125/57 (BP 1 Location: Right lower arm, BP Patient Position: At rest)   Pulse 80   Temp 99.6 °F (37.6 °C)   Resp 17   Ht 5' 11\" (1.803 m)   Wt 116.7 kg (257 lb 4.4 oz)   SpO2 94%   BMI 35.88 kg/m²    O2 Flow Rate (L/min): 2 l/min O2 Device: Nasal cannula Temp (24hrs), Av.8 °F (37.1 °C), Min:97.5 °F (36.4 °C), Max:99.6 °F (37.6 °C)    CVP (mmHg): 12 mmHg (23 1400)      Intake/Output:     Intake/Output Summary (Last 24 hours) at 3/5/2023 1540  Last data filed at 3/5/2023 1400  Gross per 24 hour   Intake 4069.36 ml   Output 1356 ml   Net 2713.36 ml Imaging    02/28/23    OR ECHO MATTHIAS INTRAOP  3/3/2023    Narrative  See Anesthesia Procedure note for procedure details. CRITICAL CARE DOCUMENTATION  I had a face to face encounter with the patient, reviewed and interpreted patient data including clinical events, labs, images, vital signs, I/O's, and examined patient. I have discussed the case and the plan and management of the patient's care with the consulting services, the bedside nurses and the respiratory therapist.      NOTE OF PERSONAL INVOLVEMENT IN CARE   This patient has a high probability of imminent, clinically significant deterioration, which requires the highest level of preparedness to intervene urgently. I participated in the decision-making and personally managed or directed the management of the following life and organ supporting interventions that required my frequent assessment to treat or prevent imminent deterioration. I personally spent 35 minutes of critical care time. This is time spent at this critically ill patient's bedside actively involved in patient care as well as the coordination of care. This does not include any procedural time which has been billed separately.     Brian Baron MD   Critical Care Medicine  [facility]

## 2023-03-05 NOTE — PROGRESS NOTES
Bedside and Verbal shift change report given to 1906 Sathya Juarez (oncoming nurse) by Shane Florence (offgoing nurse). Report included the following information SBAR, Recent Results, and Cardiac Rhythm APaced . [place] 46 Dr. Dimitris Churchill on unit. Updated on increased pressor needs, low SVR. Order received for Cam gtt. 80 Pt calling out for nurse, asking \"what's going on,\" and stating that he heard people arguing. Pt reoriented to ICU, able to answer orientation questions correctly. Explained layout of the unit, proximity of other patients. Pt declined offer of earplugs, reports feeling reassured. 0704 1 unit PRBC started. Bedside and Verbal shift change report given to Shane Florence (oncoming nurse) by 1906 Sathya Juarez (offgoing nurse). Report included the following information SBAR, Recent Results, and Cardiac Rhythm APaced .

## 2023-03-05 NOTE — PROGRESS NOTES
Problem: Self Care Deficits Care Plan (Adult)  Goal: *Acute Goals and Plan of Care (Insert Text)  Description: FUNCTIONAL STATUS PRIOR TO ADMISSION: Patient was independent and active without use of DME. Patient was independent for basic and instrumental ADLs. HOME SUPPORT: The patient lived with wife but did not require assist.    Occupational Therapy Goals  Initiated 3/5/2023  1. Patient will perform ADLs standing 5 mins without fatigue or LOB with supervision/set-up within 5 day(s). 2.  Patient will perform lower body ADLs with supervision/set-up within 5 day(s). 3.  Patient will perform gathering ADL items high and low 2/2 with supervision/set-up within 5 day(s). 4.  Patient will perform toilet transfers with supervision/set-up within 5 day(s). 5.  Patient will perform all aspects of toileting with supervision/set-up within 5 day(s). 6.  Patient will participate in cardiac/sternal upper extremity therapeutic exercise/activities to increase independence with ADLs with supervision/set-up for 5 minutes within 5 day(s). Outcome: Progressing Towards Goal   OCCUPATIONAL THERAPY EVALUATION  Patient: Jame Lopez (49 y.o. male)  Date: 3/5/2023  Primary Diagnosis: NSTEMI (non-ST elevated myocardial infarction) ([address] [I21.4]  Procedure(s) (LRB):  CORONARY ARTERY BYPASS GRAFTING X 3 WITH [facility] AND [facility],AORTIC VALVE REPLACEMENT, ECC, MATTHIAS AND EPIAORTIC U/S BY DR ROWELL [facility] (N/A) 2 Days Post-Op   Precautions:  Fall, Other (comment) (move in tube)    ASSESSMENT  Based on the objective data described below, the patient presents with impaired balance, strength, activity tolerance, and coordination impacting ability to complete ADL/iADL at baseline. Patient received with wife present, amenable to session.  Discussed at length ADL/environmental modifications to maximize ADL independence/safety while maintaining sternal precautions, patient and wife verbalized understanding and will benefit from further reinforcement. Completed transfers and seated ADL with x1 assist. Anticipate patient will progress and discharge home with [facility] and family support. Patient is not verbalizing and is not demonstrating understanding of mindful-based movements (\"move in the tube\") principles of keeping UEs proximal to ribcage to prevent lateral pull on the sternum during load-bearing activities with verbal cues required for compliance. Current Level of Function Impacting Discharge (ADLs/self-care): x1 assist mobility/ADL    Functional Outcome Measure: The patient scored 20/24 on the [facility] functional outcome measure which is indicative of minimal functional impairment. Other factors to consider for discharge: below baseline, new sternotomy,      Patient will benefit from skilled therapy intervention to address the above noted impairments. PLAN :  Recommendations and Planned Interventions: self care training, functional mobility training, therapeutic exercise, balance training, therapeutic activities, endurance activities, patient education, home safety training, and family training/education    Frequency/Duration: Patient will be followed by occupational therapy 5 times a week to address goals. Recommendation for discharge: (in order for the patient to meet his/her long term goals)  Occupational therapy at least 2 days/week in the home AND ensure assist and/or supervision for safety with ADL/IADL    This discharge recommendation:  Has not yet been discussed the attending provider and/or case management    IF patient discharges home will need the following DME: TBD pending progress       SUBJECTIVE:   Patient stated Alex Beverly my teeth was the best part of today.     OBJECTIVE DATA SUMMARY:   HISTORY:   Past Medical History:   Diagnosis Date    Hypercholesterolemia     Hypertension    History reviewed. No pertinent surgical history.     Expanded or extensive additional review of patient history:     Home Situation  Home Environment: Private residence  # Steps to Enter: 0  One/Two Story Residence: One story  Living Alone: No  Support Systems: Spouse/Significant Other  Patient Expects to be Discharged to[de-identified] Home with home health  Current DME Used/Available at Home: Grab bars, Shower chair, Other (comment) (hiking stick, transport chair)  Tub or Shower Type: Shower    Hand dominance: Right    EXAMINATION OF PERFORMANCE DEFICITS:  Cognitive/Behavioral Status:  Neurologic State: Alert  Orientation Level: Oriented X4  Cognition: Appropriate decision making; Appropriate safety awareness; Follows commands  Perception: Appears intact  Perseveration: No perseveration noted  Safety/Judgement: Awareness of environment; Insight into deficits    Skin: Surgical dressing C/D/I    Edema: none noted    Hearing: Auditory  Auditory Impairment: Hard of hearing, bilateral, Hearing aid(s)  Hearing Aids/Status: At bedside, With patient    Vision/Perceptual:                           Acuity: Impaired near vision    Corrective Lenses: Reading glasses    Range of Motion:  AROM: Generally decreased, functional  PROM: Generally decreased, functional                      Strength:  Strength: Generally decreased, functional                Coordination:  Coordination: Generally decreased, functional  Fine Motor Skills-Upper: Left Intact; Right Intact    Gross Motor Skills-Upper: Left Intact; Right Intact    Tone & Sensation:                            Balance:  Sitting: Impaired; Without support  Sitting - Static: Good (unsupported)  Sitting - Dynamic: Fair (occasional)  Standing: Impaired; With support  Standing - Static: Fair  Standing - Dynamic : Fair;Constant support    Functional Mobility and Transfers for ADLs:  Bed Mobility:       Transfers:  Sit to Stand: Minimum assistance  Stand to Sit: Contact guard assistance    ADL Assessment:  Feeding: Supervision    Oral Facial Hygiene/Grooming: Supervision    Bathing:  Moderate assistance    Type of Bath: Chlorhexidine (CHG); Full    Upper Body Dressing: Minimum assistance    Lower Body Dressing: Minimum assistance    Toileting: Minimum assistance                ADL Intervention and task modifications:       Grooming  Grooming Assistance: Supervision  Position Performed: Seated in chair  Washing Face: Supervision  Brushing Teeth: Supervision         Type of Bath: Chlorhexidine (CHG); Full                        Cognitive Retraining  Safety/Judgement: Awareness of environment; Insight into deficits    Patient instructed and educated on mindful movement principles based on Move in The Tube concept to include maintaining bilateral elbows close to rib cage when performing any load-bearing activity such as getting in/out of bed, pushing up from a chair, opening a door, or lifting a box. Patient was given a handout with diagrams of each correct/incorrect method of performing each of the above tasks. Patient instructed on the ability to utilize upper extremities outside the tube when doing any non-load bearing activity such as washing hair/body, brushing teeth, retrieving clothing items, or scratching your back. Patient encouraged to also perform upper extremity exercises \"outside of the tube\" to prevent scar tissue formation around sternal incision site. Patient instructed in detail about activities to heed with caution, allowing pain to be the guide. These activities include but are not limited to: mowing the lawn, riding a bike, walking a dog, lifting a child, workshop hobbies, golfing, sexual activity, vacuuming, fishing, scrubbing the floors, and moving furniture. Patient was given the [address] [place] handout to describe each of these activities in detail. Patient instructed no asymmetrical reaching over head to ensure B UEs when shoulders >90* i.e. reaching in cabinets and dressing.  Instruction on upper body dressing techniques of over head, then arms through to decrease pain and unilateral shoulder flexion >90*. Instruction on the benefits of utilizing B UEs during functional tasks i.e. opening the fridge, stepping into the tub. Instruction if continued pain at home with shoulder IR for BM hygiene can use wet wipes and toilet tongs PRN. Avoid valsalva maneuvers. Increase activity tolerance for home, work, and sexual intercourse by pacing self with increasing the arm exercises, sitting duration, frequency OOB, walking, standing, and ADLs. Instructed and indicated understanding of s/s of too much activity, how to respond to s/s safely. Functional Measure:  [address] [facility] [address] [address] AM-PAC®      Daily Activity Inpatient Short Form (6-Clicks) Version 2  How much HELP from another person do you currently need. .. (If the patient hasn't done an activity recently, how much help from another person do you think they would need if they tried?) Total A Lot A Little None   1. Putting on and taking off regular lower body clothing? []   1 []   2 [x]   3 []   4   2. Bathing (including washing, rinsing, drying)? []   1 []   2 [x]   3 []   4   3. Toileting, which includes using toilet, bedpan, or urinal? []   1 []   2 [x]   3 []   4   4. Putting on and taking off regular upper body clothing? []   1 []   2 [x]   3 []   4   5. Taking care of personal grooming such as brushing teeth? []   1 []   2 []   3 [x]   4   6. Eating meals? []   1 []   2 []   3 [x]   4     Raw Score: 20/24                            Cutoff score ?191,2,3 had higher odds of discharging home with home health or need of SNF/IPR    1. Vinny Leonekshire, Sherri Cook Sydelle Flattery Mathis Aschoff. Validity of the AM-PAC 6-Clicks Inpatient Daily Activity and Basic Mobility Short Forms. Physical Therapy Mar 2014, 94 (3) 379-391; DOI: 10.2522/ptj.80517338  2. Yahir Cruz.  Association of AM-PAC \"6-Clicks\" Basic Mobility and Daily Activity Scores With Discharge Destination. Phys Ther. 2021 Apr 4;101(4):dcun223. doi: 10.1093/ptj/gvsb964. PMID: 90879892. V Simba Sims, Gianfranco LOCKE, Mary Tierney, Yeison K, Rylie S. Activity Measure for Post-Acute Care \"6-Clicks\" Basic Mobility Scores Predict Discharge Destination After Acute Care Hospitalization in Select Patient Groups: A Retrospective, Observational Study. Arch Rehabil Res Clin Transl. 2022 Jul 16;4(3):547022. doi: 10.1016/j.arrct. 0489.663761. PMID: 89643039; PMCID: [id number]. 4. Isaias Ocampo, Lyndsay ADAMES. AM-PAC Short Forms Manual 4.0. Revised 2/2020. Occupational Therapy Evaluation Charge Determination   History Examination Decision-Making   LOW Complexity : Brief history review  LOW Complexity : 1-3 performance deficits relating to physical, cognitive , or psychosocial skils that result in activity limitations and / or participation restrictions  LOW Complexity : No comorbidities that affect functional and no verbal or physical assistance needed to complete eval tasks       Based on the above components, the patient evaluation is determined to be of the following complexity level: LOW   Pain Rating:  None reported    Activity Tolerance:   Fair and requires rest breaks    After treatment patient left in no apparent distress:    Sitting in chair, Call bell within reach, and Caregiver / family present    COMMUNICATION/EDUCATION:   The patients plan of care was discussed with: Physical therapist and Registered nurse. Home safety education was provided and the patient/caregiver indicated understanding., Patient/family have participated as able in goal setting and plan of care. , and Patient/family agree to work toward stated goals and plan of care. This patients plan of care is appropriate for delegation to [facility].     Thank you for this referral.  Lopez Vazquez OT  Time Calculation: 41 mins

## 2023-03-05 NOTE — PROGRESS NOTES
Problem: Mobility Impaired (Adult and Pediatric)  Goal: *Acute Goals and Plan of Care (Insert Text)  Description: FUNCTIONAL STATUS PRIOR TO ADMISSION: Patient was independent and active without use of DME.     HOME SUPPORT PRIOR TO ADMISSION: The patient lived with wife but did not require assist.    Physical Therapy Goals  Initiated 3/5/2023  1. Patient will move from supine to sit and sit to supine  and scoot up and down in bed with modified independence within 5 days. 2.  Patient will perform sit to/from stand with modified independence within 5 days. 3.  Patient will ambulate 300 feet with least restrictive assistive device and supervision/set-up within 5 days. 4.  Patient will ascend/descend 2 stairs with one handrail(s) with minimal assistance/contact guard assist within 5 days. 5.  Patient will perform cardiac exercises per protocol with supervision/set-up within 5 days. 6.  Patient will verbally recall and functionally demonstrate mindful-based movements (\"move in the tube\") principles without cues within 5 days. Outcome: Progressing Towards Goal   PHYSICAL THERAPY EVALUATION  Patient: Ruth Hylton (46 y.o. male)  Date: 3/5/2023  Primary Diagnosis: NSTEMI (non-ST elevated myocardial infarction) ([facility] [address] [I21.4]  Procedure(s) (LRB):  CORONARY ARTERY BYPASS GRAFTING X 3 WITH TERENCE,AORTIC VALVE REPLACEMENT, ECC, MATTHIAS AND EPIAORTIC U/S BY DR ROWELL [facility] (N/A) 2 Days Post-Op   Precautions:   Fall, Other (comment) (move in tube)      ASSESSMENT  Based on the objective data described below, the patient presents with generalized weakness, balance deficits and decreased activity tolerance. Pt received blood transfusion this morning. Pt received in chair, wife present and supportive. Educated pt on move in tube and technique for transfers. Pt required 2 attempts to stand from chair.  Pt tolerated standing with contact guard and progressed to standing exercises, then required to return to chair due to fatigue. Will progress toward ambulation as tolerated and anticipate good progress with continued mobility. Patient is verbalizing and is demonstrating understanding of mindful-based movements (\"move in the tube\") principles of keeping UEs proximal to ribcage to prevent lateral pull on the sternum during load-bearing activities with verbal cues required for compliance. Current Level of Function Impacting Discharge (mobility/balance): min A to stand    Functional Outcome Measure: The patient scored 10/24 on the [facility] outcome measure which is indicative of cutoff score ?171,2,3 had higher odds of discharging home with home health or need of SNF/IPR. Other factors to consider for discharge: fall risk, move in tube     Patient will benefit from skilled therapy intervention to address the above noted impairments. PLAN :  Recommendations and Planned Interventions: bed mobility training, transfer training, gait training, therapeutic exercises, neuromuscular re-education, patient and family training/education, and therapeutic activities      Frequency/Duration: Patient will be followed by physical therapy:  daily to address goals. Recommendation for discharge: (in order for the patient to meet his/her long term goals)  Anticipate Physical therapy at least 2 days/week in the home AND ensure assist and/or supervision for safety with as needed    This discharge recommendation:  Has not yet been discussed the attending provider and/or case management    IF patient discharges home will need the following DME: to be determined (TBD)         SUBJECTIVE:   Patient stated Oh I am doing PT today?     OBJECTIVE DATA SUMMARY:   Patient mobilized on continuous portable monitor/telemetry. HISTORY:    Past Medical History:   Diagnosis Date    Hypercholesterolemia     Hypertension    History reviewed. No pertinent surgical history.     Personal factors and/or comorbidities impacting plan of care: PMH    Home Situation  Home Environment: Private residence  # Steps to Enter: 0  One/Two Story Residence: One story  Living Alone: No  Support Systems: Spouse/Significant Other  Patient Expects to be Discharged to[de-identified] Home with home health  Current DME Used/Available at Home: Grab bars, Shower chair, Other (comment) (hiking stick, transport chair)  Tub or Shower Type: Shower    EXAMINATION/PRESENTATION/DECISION MAKING:     Critical Behavior:  Neurologic State: Alert  Orientation Level: Oriented X4  Cognition: Appropriate decision making, Appropriate safety awareness, Follows commands  Safety/Judgement: Awareness of environment, Insight into deficits  Hearing: Auditory  Auditory Impairment: Hard of hearing, bilateral, Hearing aid(s)  Hearing Aids/Status: At bedside, With patient  Skin:  intact  Edema: none  Range Of Motion:  AROM: Generally decreased, functional           PROM: Generally decreased, functional           Strength:    Strength: Generally decreased, functional                    Tone & Sensation:                                  Coordination:  Coordination: Generally decreased, functional  Vision:   Acuity: Impaired near vision  Corrective Lenses: Reading glasses  Functional Mobility:  Bed Mobility:              Transfers:  Sit to Stand: Minimum assistance  Stand to Sit: Contact guard assistance  Noted difficulty scooting toward edge of chair  Balance:   Sitting: Impaired; Without support  Sitting - Static: Good (unsupported)  Sitting - Dynamic: Fair (occasional)  Standing: Impaired; With support  Standing - Static: Fair  Standing - Dynamic : Fair;Constant support    Pt standing with wide SHIV, able to perform marches requiring min A to maintain balance     Cardiac diagnosis intervention:  Patient instructed and educated on mindful movement principles based on Move in The Tube concept to include maintaining bilateral elbows close to rib cage when performing any load-bearing activity such as getting in/out of bed, pushing up from a chair, opening a door, or lifting a box. Patient was given a handout with diagrams of each correct/incorrect method of performing each of the above tasks. Therapeutic Exercises:   Earlene Duron as listed above, deferred UE due to fatigue    Functional Measure:  [address] [facility] [address] [address] AM-PAC®      Basic Mobility Inpatient Short Form (6-Clicks) Version 2  How much HELP from another person do you currently need. .. (If the patient hasn't done an activity recently, how much help from another person do you think they would need if they tried?) Total A Lot A Little None   1. Turning from your back to your side while in a flat bed without using bedrails? []  1 [x]  2 []  3  []  4   2. Moving from lying on your back to sitting on the side of a flat bed without using bedrails? []  1 [x]  2 []  3  []  4   3. Moving to and from a bed to a chair (including a wheelchair)? []  1 [x]  2 []  3  []  4   4. Standing up from a chair using your arms (e.g. wheelchair or bedside chair)? []  1 [x]  2 []  3  []  4   5. Walking in hospital room? [x]  1 []  2 []  3  []  4   6. Climbing 3-5 steps with a railing? [x]  1 []  2 []  3  []  4     Raw Score: 10/24                            Cutoff score ?171,2,3 had higher odds of discharging home with home health or need of SNF/IPR. [facility] [facility], Juhi Haley. Validity of the AM-PAC 6-Clicks Inpatient Daily Activity and Basic Mobility Short Forms. Physical Therapy Mar 2014, 94 (3) 379-391; DOI: 10.2522/ptj.95309141  2. Danny Abreu. Association of AM-PAC \"6-Clicks\" Basic Mobility and Daily Activity Scores With Discharge Destination. Phys Ther. 2021 Apr 4;101(4):cccg557. doi: 10.1093/ptj/zjli856. PMID: 61922034. V Simba Sims, Gianfranco LOCKE, Mary Tierney, Yeison LAMA, Rylie S.  Activity Measure for Post-Acute Care \"6-Clicks\" Basic Mobility Scores Predict Discharge Destination After Acute Care Hospitalization in Select Patient Groups: A Retrospective, Observational Study. Arch Rehabil Res Clin Transl. 2022 Jul 16;4(3):627538. doi: 10.1016/j.arrct. 1376.318001. PMID: 44275434; PMCID: [id number]. 4. Isaias Landis Ni P. AM-PAC Short Forms Manual 4.0. Revised 2/2020. Physical Therapy Evaluation Charge Determination   History Examination Presentation Decision-Making   HIGH Complexity :3+ comorbidities / personal factors will impact the outcome/ POC  MEDIUM Complexity : 3 Standardized tests and measures addressing body structure, function, activity limitation and / or participation in recreation  MEDIUM Complexity : Evolving with changing characteristics  Other outcome measures [facility]  HIGH       Based on the above components, the patient evaluation is determined to be of the following complexity level: MEDIUM      Activity Tolerance:   Fair and requires rest breaks  Pt on 2 L O2 throughout, vitals stable    After treatment patient left in no apparent distress:   Sitting in chair, Call bell within reach, and Caregiver / family present    COMMUNICATION/EDUCATION:   The patients plan of care was discussed with: Occupational therapist and Registered nurse. Fall prevention education was provided and the patient/caregiver indicated understanding., Patient/family have participated as able in goal setting and plan of care. , and Patient/family agree to work toward stated goals and plan of care.     Thank you for this referral.  Elaine Baca, PT   Time Calculation: 25 mins

## 2023-03-06 ENCOUNTER — APPOINTMENT (OUTPATIENT)
Dept: GENERAL RADIOLOGY | Age: 78
DRG: 219 | End: 2023-03-06
Attending: PHYSICIAN ASSISTANT
Payer: MEDICARE

## 2023-03-06 LAB
ABO + RH BLD: NORMAL
ALBUMIN SERPL-MCNC: 2.8 G/DL (ref 3.5–5)
ALBUMIN/GLOB SERPL: 1 (ref 1.1–2.2)
ALP SERPL-CCNC: 42 U/L (ref 45–117)
ALT SERPL-CCNC: 20 U/L (ref 12–78)
ANION GAP SERPL CALC-SCNC: 5 MMOL/L (ref 5–15)
AST SERPL-CCNC: 51 U/L (ref 15–37)
BDY SITE: ABNORMAL
BILIRUB SERPL-MCNC: 0.6 MG/DL (ref 0.2–1)
BLD PROD TYP BPU: NORMAL
BLOOD GROUP ANTIBODIES SERPL: NORMAL
BPU ID: NORMAL
BUN SERPL-MCNC: 21 MG/DL (ref 6–20)
BUN/CREAT SERPL: 31 (ref 12–20)
CALCIUM SERPL-MCNC: 8.6 MG/DL (ref 8.5–10.1)
CHLORIDE SERPL-SCNC: 105 MMOL/L (ref 97–108)
CO2 SERPL-SCNC: 27 MMOL/L (ref 21–32)
COHGB MFR BLD: 1.5 % (ref 1–2)
CREAT SERPL-MCNC: 0.68 MG/DL (ref 0.7–1.3)
CROSSMATCH RESULT,%XM: NORMAL
ERYTHROCYTE [DISTWIDTH] IN BLOOD BY AUTOMATED COUNT: 14.2 % (ref 11.5–14.5)
GLOBULIN SER CALC-MCNC: 2.9 G/DL (ref 2–4)
GLUCOSE BLD STRIP.AUTO-MCNC: 112 MG/DL (ref 65–117)
GLUCOSE SERPL-MCNC: 120 MG/DL (ref 65–100)
HCT VFR BLD AUTO: 23.7 % (ref 36.6–50.3)
HGB BLD-MCNC: 8 G/DL (ref 12.1–17)
MAGNESIUM SERPL-MCNC: 2.4 MG/DL (ref 1.6–2.4)
MCH RBC QN AUTO: 29.4 PG (ref 26–34)
MCHC RBC AUTO-ENTMCNC: 33.8 G/DL (ref 30–36.5)
MCV RBC AUTO: 87.1 FL (ref 80–99)
METHGB MFR BLD: 0.3 % (ref 0–1.4)
NRBC # BLD: 0.04 K/UL (ref 0–0.01)
NRBC BLD-RTO: 0.5 PER 100 WBC
OXYHGB MFR BLD: 57 % (ref 94–97)
PLATELET # BLD AUTO: 67 K/UL (ref 150–400)
PMV BLD AUTO: 11.6 FL (ref 8.9–12.9)
POTASSIUM SERPL-SCNC: 4.2 MMOL/L (ref 3.5–5.1)
PROT SERPL-MCNC: 5.7 G/DL (ref 6.4–8.2)
RBC # BLD AUTO: 2.72 M/UL (ref 4.1–5.7)
SAO2 % BLD: 58 % (ref 95–99)
SERVICE CMNT-IMP: NORMAL
SODIUM SERPL-SCNC: 137 MMOL/L (ref 136–145)
SPECIMEN EXP DATE BLD: NORMAL
SPECIMEN SITE: ABNORMAL
STATUS OF UNIT,%ST: NORMAL
UNIT DIVISION, %UDIV: 0
WBC # BLD AUTO: 7.5 K/UL (ref 4.1–11.1)

## 2023-03-06 PROCEDURE — 82962 GLUCOSE BLOOD TEST: CPT

## 2023-03-06 PROCEDURE — 80053 COMPREHEN METABOLIC PANEL: CPT

## 2023-03-06 PROCEDURE — 85027 COMPLETE CBC AUTOMATED: CPT

## 2023-03-06 PROCEDURE — 74011000258 HC RX REV CODE- 258

## 2023-03-06 PROCEDURE — 74011000250 HC RX REV CODE- 250

## 2023-03-06 PROCEDURE — 94762 N-INVAS EAR/PLS OXIMTRY CONT: CPT

## 2023-03-06 PROCEDURE — 74011250637 HC RX REV CODE- 250/637

## 2023-03-06 PROCEDURE — 97535 SELF CARE MNGMENT TRAINING: CPT

## 2023-03-06 PROCEDURE — 74011250637 HC RX REV CODE- 250/637: Performed by: PHYSICIAN ASSISTANT

## 2023-03-06 PROCEDURE — 74011250636 HC RX REV CODE- 250/636: Performed by: PHYSICIAN ASSISTANT

## 2023-03-06 PROCEDURE — 74011000250 HC RX REV CODE- 250: Performed by: PHYSICIAN ASSISTANT

## 2023-03-06 PROCEDURE — 36415 COLL VENOUS BLD VENIPUNCTURE: CPT

## 2023-03-06 PROCEDURE — 65660000001 HC RM ICU INTERMED STEPDOWN

## 2023-03-06 PROCEDURE — 74011250637 HC RX REV CODE- 250/637: Performed by: STUDENT IN AN ORGANIZED HEALTH CARE EDUCATION/TRAINING PROGRAM

## 2023-03-06 PROCEDURE — 71045 X-RAY EXAM CHEST 1 VIEW: CPT

## 2023-03-06 PROCEDURE — 83735 ASSAY OF MAGNESIUM: CPT

## 2023-03-06 PROCEDURE — 74011250636 HC RX REV CODE- 250/636

## 2023-03-06 PROCEDURE — 74011000250 HC RX REV CODE- 250: Performed by: ANESTHESIOLOGY

## 2023-03-06 PROCEDURE — 94760 N-INVAS EAR/PLS OXIMETRY 1: CPT

## 2023-03-06 PROCEDURE — 77010033678 HC OXYGEN DAILY

## 2023-03-06 PROCEDURE — 97530 THERAPEUTIC ACTIVITIES: CPT

## 2023-03-06 PROCEDURE — 82375 ASSAY CARBOXYHB QUANT: CPT

## 2023-03-06 PROCEDURE — 97116 GAIT TRAINING THERAPY: CPT

## 2023-03-06 RX ORDER — FUROSEMIDE 10 MG/ML
40 INJECTION INTRAMUSCULAR; INTRAVENOUS ONCE
Status: COMPLETED | OUTPATIENT
Start: 2023-03-06 | End: 2023-03-06

## 2023-03-06 RX ORDER — EZETIMIBE 10 MG/1
10 TABLET ORAL DAILY
Status: DISCONTINUED | OUTPATIENT
Start: 2023-03-06 | End: 2023-03-09 | Stop reason: HOSPADM

## 2023-03-06 RX ORDER — SODIUM CHLORIDE 0.9 % (FLUSH) 0.9 %
5-40 SYRINGE (ML) INJECTION AS NEEDED
Status: DISCONTINUED | OUTPATIENT
Start: 2023-03-06 | End: 2023-03-09 | Stop reason: HOSPADM

## 2023-03-06 RX ORDER — ROSUVASTATIN CALCIUM 10 MG/1
20 TABLET, COATED ORAL
Status: DISCONTINUED | OUTPATIENT
Start: 2023-03-06 | End: 2023-03-09 | Stop reason: HOSPADM

## 2023-03-06 RX ORDER — SODIUM CHLORIDE 0.9 % (FLUSH) 0.9 %
5-40 SYRINGE (ML) INJECTION EVERY 8 HOURS
Status: DISCONTINUED | OUTPATIENT
Start: 2023-03-06 | End: 2023-03-09 | Stop reason: HOSPADM

## 2023-03-06 RX ORDER — FUROSEMIDE 10 MG/ML
20 INJECTION INTRAMUSCULAR; INTRAVENOUS ONCE
Status: COMPLETED | OUTPATIENT
Start: 2023-03-06 | End: 2023-03-06

## 2023-03-06 RX ORDER — CARVEDILOL 3.12 MG/1
3.12 TABLET ORAL 2 TIMES DAILY WITH MEALS
Status: DISCONTINUED | OUTPATIENT
Start: 2023-03-06 | End: 2023-03-08

## 2023-03-06 RX ORDER — AMIODARONE HYDROCHLORIDE 200 MG/1
400 TABLET ORAL EVERY 12 HOURS
Status: DISCONTINUED | OUTPATIENT
Start: 2023-03-06 | End: 2023-03-09 | Stop reason: HOSPADM

## 2023-03-06 RX ORDER — PANTOPRAZOLE SODIUM 40 MG/1
40 TABLET, DELAYED RELEASE ORAL
Status: DISCONTINUED | OUTPATIENT
Start: 2023-03-07 | End: 2023-03-09 | Stop reason: HOSPADM

## 2023-03-06 RX ADMIN — ROSUVASTATIN 20 MG: 10 TABLET, FILM COATED ORAL at 21:09

## 2023-03-06 RX ADMIN — SODIUM CHLORIDE, PRESERVATIVE FREE 10 ML: 5 INJECTION INTRAVENOUS at 17:44

## 2023-03-06 RX ADMIN — AMIODARONE HYDROCHLORIDE 1 MG/MIN: 50 INJECTION, SOLUTION INTRAVENOUS at 11:50

## 2023-03-06 RX ADMIN — ONDANSETRON HYDROCHLORIDE 4 MG: 2 SOLUTION INTRAMUSCULAR; INTRAVENOUS at 07:01

## 2023-03-06 RX ADMIN — Medication 400 MG: at 17:44

## 2023-03-06 RX ADMIN — EZETIMIBE 10 MG: 10 TABLET ORAL at 12:57

## 2023-03-06 RX ADMIN — Medication 3 MG: at 21:09

## 2023-03-06 RX ADMIN — CHLORHEXIDINE GLUCONATE 10 ML: 1.2 RINSE ORAL at 21:10

## 2023-03-06 RX ADMIN — SENNOSIDES AND DOCUSATE SODIUM 1 TABLET: 50; 8.6 TABLET ORAL at 17:45

## 2023-03-06 RX ADMIN — MUPIROCIN: 20 OINTMENT TOPICAL at 17:46

## 2023-03-06 RX ADMIN — FUROSEMIDE 40 MG: 10 INJECTION, SOLUTION INTRAMUSCULAR; INTRAVENOUS at 17:45

## 2023-03-06 RX ADMIN — POLYETHYLENE GLYCOL 3350 17 G: 17 POWDER, FOR SOLUTION ORAL at 09:39

## 2023-03-06 RX ADMIN — AMIODARONE HYDROCHLORIDE 400 MG: 200 TABLET ORAL at 13:45

## 2023-03-06 RX ADMIN — MUPIROCIN: 20 OINTMENT TOPICAL at 09:45

## 2023-03-06 RX ADMIN — AMIODARONE HYDROCHLORIDE 400 MG: 200 TABLET ORAL at 21:10

## 2023-03-06 RX ADMIN — CARVEDILOL 3.12 MG: 3.12 TABLET, FILM COATED ORAL at 17:44

## 2023-03-06 RX ADMIN — ACETAMINOPHEN 650 MG: 325 TABLET ORAL at 04:05

## 2023-03-06 RX ADMIN — SODIUM CHLORIDE, PRESERVATIVE FREE 10 ML: 5 INJECTION INTRAVENOUS at 07:02

## 2023-03-06 RX ADMIN — AMIODARONE HYDROCHLORIDE 0.5 MG/MIN: 50 INJECTION, SOLUTION INTRAVENOUS at 19:13

## 2023-03-06 RX ADMIN — Medication 400 MG: at 09:39

## 2023-03-06 RX ADMIN — SODIUM CHLORIDE, PRESERVATIVE FREE 10 ML: 5 INJECTION INTRAVENOUS at 02:08

## 2023-03-06 RX ADMIN — SODIUM CHLORIDE, PRESERVATIVE FREE 10 ML: 5 INJECTION INTRAVENOUS at 16:19

## 2023-03-06 RX ADMIN — CHLORHEXIDINE GLUCONATE 10 ML: 1.2 RINSE ORAL at 09:45

## 2023-03-06 RX ADMIN — CARVEDILOL 3.12 MG: 3.12 TABLET, FILM COATED ORAL at 09:39

## 2023-03-06 RX ADMIN — ACETAMINOPHEN 650 MG: 325 TABLET ORAL at 21:09

## 2023-03-06 RX ADMIN — SENNOSIDES AND DOCUSATE SODIUM 1 TABLET: 50; 8.6 TABLET ORAL at 09:39

## 2023-03-06 RX ADMIN — FUROSEMIDE 20 MG: 10 INJECTION, SOLUTION INTRAMUSCULAR; INTRAVENOUS at 09:39

## 2023-03-06 RX ADMIN — DEXTROSE MONOHYDRATE 150 MG: 50 INJECTION, SOLUTION INTRAVENOUS at 12:02

## 2023-03-06 NOTE — PROGRESS NOTES
[facility] visit. Mr. Kingsley Spencer was asleep. His nurse reported that he hard a rough morning. Assured I wouldn't wake him. His wife was not there at the time of the visit. Prayer for spiritual communion offered for his healing.     GERBER Powell, RN, ACSW, LCSW   Page:  [phone]

## 2023-03-06 NOTE — PROGRESS NOTES
0800: Report received from [facility]. Patient care assumed. 1130: Pt. Back in bed with PT, pt converted into Afib in the 130's. Lurdes Nguyen NP notified, orders for amio bolus ans gtt. 1200: Bedside and Verbal shift change report given to Baljinder Felix RN (oncoming nurse) by Hola Blake RN (offgoing nurse). Report included the following information SBAR, Kardex, OR Summary, Intake/Output, MAR, Recent Results, Med Rec Status, Cardiac Rhythm Afib, and Alarm Parameters .

## 2023-03-06 NOTE — PROGRESS NOTES
1230: Bedside and Verbal shift change report given to 1330 Mila Davis (oncoming nurse) by Gabriel Gonsales (offgoing nurse). Report included the following information SBAR, OR Summary, Intake/Output, MAR, Recent Results, Cardiac Rhythm NSR, and Alarm Parameters . [address] CT removed by Breanne ZAVALETA    2000: Bedside and Verbal shift change report given to Gavin Fonseca (oncoming nurse) by 1330 Mila Davis (offgoing nurse). Report included the following information SBAR, OR Summary, Intake/Output, MAR, Recent Results, Cardiac Rhythm NSR, and Alarm Parameters .

## 2023-03-06 NOTE — PROGRESS NOTES
hospitals ICU Progress Note    Admit Date: 2023  POD:  3 Days Post-Op    Procedure:  Procedure(s):  CORONARY ARTERY BYPASS GRAFTING X 3 WITH LIMA AND KIRK,AORTIC VALVE REPLACEMENT, ECC, MATTHIAS AND EPIAORTIC U/S BY DR ROWELL [facility]        Subjective:   Pt seen with Dr. Paula Perez. CC is nausea. SR, tmax 37.7, on 2L NC. Objective:   Vitals:  Blood pressure (!) 125/57, pulse 71, temperature 99 °F (37.2 °C), resp. rate 19, height 5' 11\" (1.803 m), weight 255 lb 1.6 oz (115.7 kg), SpO2 96 %. Temp (24hrs), Av.4 °F (37.4 °C), Min:99 °F (37.2 °C), Max:99.9 °F (37.7 °C)      EKG/Rhythm:  SR      CT Output: 127 + 120 = 247      Oxygen Therapy: 2L NC      CXR:    CXR Results  (Last 48 hours)                 23 0446  XR CHEST PORT Final result    Impression:  Bibasilar atelectasis, unchanged. Narrative:  INDICATION: postop heart       EXAMINATION:  AP CHEST, PORTABLE       COMPARISON: 3/5/2023       FINDINGS: Single AP portable view of the chest demonstrates no change in   position of the lines and tubes. The cardiomediastinal silhouette is unchanged. Persistent bibasilar atelectasis. No pulmonary edema or pneumothorax. 23 0510  XR CHEST PORT Final result    Impression:  Interval removal of endotracheal and nasogastric tubes with bibasilar   atelectasis. Narrative:  INDICATION: postop heart       EXAMINATION:  AP CHEST, PORTABLE       COMPARISON: 3/4/2023       FINDINGS: Single AP portable view of the chest demonstrates interval removal of   endotracheal and nasogastric tubes. Mediastinal and pleural drains and right IJ   PA catheter unchanged. The cardiomediastinal silhouette is unchanged. Bibasilar   atelectasis. No pneumothorax.                     Admission Weight: Last Weight   Weight: 237 lb 14 oz (107.9 kg) Weight: 255 lb 1.6 oz (115.7 kg)     Intake / Output / Drain:  Current Shift:  0701 -  1900  In: 5 [I.V.:5]  Out: 150 [Urine:120; Drains:30]  Last 24 hrs.:   Intake/Output Summary (Last 24 hours) at 3/6/2023 1010  Last data filed at 3/6/2023 0900  Gross per 24 hour   Intake 1553.2 ml   Output 1100 ml   Net 453.2 ml       EXAM:  General:  Sitting up in chair, in no distress                                                                                           Lungs:   Clear/diminished to auscultation bilaterally. Incision:  No erythema, drainage or swelling. Heart:  Regular rate and rhythm, S1, S2 normal, no murmur, click, rub or gallop. Abdomen:   Soft, non-tender. Bowel sounds normal. No masses,  No organomegaly. + flatus, - BM   Extremities:  Mild 1/2 + edema. PPP. Warm   Neurologic:  Gross motor and sensory apparatus intact. Equal strength, clear speech     Labs:   Recent Labs     03/06/23  0829 03/06/23  0406 03/04/23  0003 03/03/23  2320   WBC  --  7.5   < > 7.4   HGB  --  8.0*   < > 8.6*   HCT  --  23.7*   < > 24.2*   PLT  --  67*   < > 89*   NA  --  137   < > 140   K  --  4.2   < > 4.9   BUN  --  21*   < > 16   CREA  --  0.68*   < > 0.86   GLU  --  120*   < > 110*   GLUCPOC 112  --    < >  --    INR  --   --   --  1.2*    < > = values in this interval not displayed. Assessment:     Principal Problem:    NSTEMI (non-ST elevated myocardial infarction) ([facility] [address] (2/24/2023)    Active Problems:    S/P AVR (3/3/2023)         Plan/Recommendations/Medical Decision Making:     CAD/NSTEMI: OSH, + troponins, cath results with MVD  - Continue ASA 81mg and crestor, and zetia  - Continue Coreg 6.25mg BID  - Will d/c chest tubes today, cont wires  - deline, remove Pa catheter, cordis, and jenkins    S/p AVR tissue: Asymptomatic pre-operatively, noted on ECHO  - Continue ASA  - echo ordered     Acute postoperative thrombocytopenia: Above transfusion threshold. - holding asa  - monitor cbc  - d/c famotidine, initiate PPI for GI prophylaxis     Acute postoperative blood loss anemia: Above transfusion threshold.  Received several products POD0 and received 1 unit PRBC 3/5  - Monitor cbc     Transaminitis: AST downtrending  - monitor CMP, ok to cont statin and tylenol    HTN: PTA medications Micardis (telmisartan) 20mg daily. Losartan was started inpatient preop.  - Initiate coreg 3.215 today     Dyslipidemia: PTA Crestor 10mg and Zetia 10mg  - Crestor increased to 20mg daily by Cardiology  - Continue Zetia     Hiatal Hernia: Noted on xray on admission  - No acute interventions     Nutrition: advance as tolerated  GI proph: protonix  Bowel reg: miralax, pericolace  DVT proph: SCDs, ambulate    Dispo: Deline, transfer to floor. Needs echo. D/c home with home health tomorrow vs. Weds.     Signed By: Yani Mclaughlin NP

## 2023-03-06 NOTE — PROGRESS NOTES
Problem: Mobility Impaired (Adult and Pediatric)  Goal: *Acute Goals and Plan of Care (Insert Text)  Description: FUNCTIONAL STATUS PRIOR TO ADMISSION: Patient was independent and active without use of DME.     HOME SUPPORT PRIOR TO ADMISSION: The patient lived with wife but did not require assist.    Physical Therapy Goals  Initiated 3/5/2023  1. Patient will move from supine to sit and sit to supine  and scoot up and down in bed with modified independence within 5 days. 2.  Patient will perform sit to/from stand with modified independence within 5 days. 3.  Patient will ambulate 300 feet with least restrictive assistive device and supervision/set-up within 5 days. 4.  Patient will ascend/descend 2 stairs with one handrail(s) with minimal assistance/contact guard assist within 5 days. 5.  Patient will perform cardiac exercises per protocol with supervision/set-up within 5 days. 6.  Patient will verbally recall and functionally demonstrate mindful-based movements (\"move in the tube\") principles without cues within 5 days. Outcome: Progressing Towards Goal       PHYSICAL THERAPY TREATMENT  Patient: Elen Choi (51 y.o. male)  Date: 3/6/2023  Diagnosis: NSTEMI (non-ST elevated myocardial infarction) ([facility] [address] [I21.4] NSTEMI (non-ST elevated myocardial infarction) ([address]  Procedure(s) (LRB):  CORONARY ARTERY BYPASS GRAFTING X 3 WITH LIMA AND [place],AORTIC VALVE REPLACEMENT, ECC, MATTHIAS AND EPIAORTIC U/S BY DR ROWELL [facility] (N/A) 3 Days Post-Op  Precautions: Fall, Other (comment) (move in tube)  Chart, physical therapy assessment, plan of care and goals were reviewed. ASSESSMENT  Patient continues with skilled PT services and is progressing towards goals. Pt reports dizziness on arrival, and required encouragement to ambulate. Pt required v.c and assist to complete stand. Pt was able to ambulate with a slightly unsteady gait. Pt reports dizziness better post gait.  Pt was returned supine and positioned for comfort. Seated /77 HR 71bpm  SpO2 96% on 2 L O2  Standing /76  bpm    Patient is demonstrating understanding of mindful-based movements (\"move in the tube\") principles of keeping UEs proximal to ribcage to prevent lateral pull on the sternum during load-bearing activities with verbal cues required for compliance. Current Level of Function Impacting Discharge (mobility/balance): min      Other factors to consider for discharge: sternal incision. Troy Lager PLAN :  Patient continues to benefit from skilled intervention to address the above impairments. Continue treatment per established plan of care. to address goals. Recommendation for discharge: (in order for the patient to meet his/her long term goals)  Physical therapy at least 2 days/week in the home AND ensure assist and/or supervision for safety with mobility as needed    This discharge recommendation:  Has not yet been discussed the attending provider and/or case management    IF patient discharges home will need the following DME: none       SUBJECTIVE:   Patient stated I really don't want to walk.     OBJECTIVE DATA SUMMARY:   Patient mobilized on continuous portable monitor/telemetry. Critical Behavior:  Neurologic State: Alert  Orientation Level: Oriented X4  Cognition: Appropriate decision making, Appropriate safety awareness, Follows commands  Safety/Judgement: Awareness of environment, Insight into deficits    Functional Mobility Training:  Bed Mobility:        Sit to Supine: Moderate assistance             Transfers:  Sit to Stand: Minimum assistance  Stand to Sit: Contact guard assistance                               Balance:  Sitting: Intact; With support  Standing: Impaired  Standing - Static: Good  Standing - Dynamic : Fair    Ambulation/Gait Training:  Distance (ft): 70 Feet (ft)  Assistive Device: Gait belt (HHA)  Ambulation - Level of Assistance: Contact guard assistance        Gait Abnormalities: Decreased step clearance        Base of Support: Widened        Step Length: Right shortened;Left shortened                   Stairs:             Cardiac diagnosis intervention:  Patient instructed and educated on mindful movement principles based on Move in The Tube concept to include maintaining bilateral elbows close to rib cage when performing any load-bearing activity such as getting in/out of bed, pushing up from a chair, opening a door, or lifting a box. Patient was given a handout with diagrams of each correct/incorrect method of performing each of the above tasks. Therapeutic Exercises:         Pain Rating:  Incisional     Activity Tolerance:   requires rest breaks    After treatment patient left in no apparent distress:   Supine in bed and Call bell within reach    COMMUNICATION/COLLABORATION:   The patients plan of care was discussed with: Occupational therapist and Registered nurse.      Bernarda Buenrostro PTA   Time Calculation: 19 mins

## 2023-03-06 NOTE — PROCEDURES
Chest tube removal:    Patient lying quietly in Bed, procedure explained to patient. CT insertion sites cleaned with chlorhexidine solutio, retention sutures cut. Xeroform gauze, 4 x 4, and medipore tape placed over CT insertion site. Suction removed from chest tubes. Patient instructed to take deep breath and perform long, slow expiration.     CT removed without difficulty, dressing secured over CT sites, no drainage noted at time of removal.    No patient complaints after completion of CT removal.    Kwesi Turner, APRN  03/06/23  4:46 PM

## 2023-03-06 NOTE — PROGRESS NOTES
Bedside and Verbal shift change report given to Alton John (oncoming nurse) by Carmen Chapman (offgoing nurse). Report included the following information SBAR, Recent Results, and Cardiac Rhythm NSR . Uneventful shift. Minimal pain. BP trending up last few hours of shift, PAPs slightly lower. Episode of nausea at 0700, resolved with PRN zofran. Mild abdominal distention noted, Pt belching, passed flatus overnight. Bedside and Verbal shift change report given to Manuel Ross (oncoming nurse) by Alton John (offgoing nurse).  Report included the following information SBAR, Procedure Summary, Recent Results, and Cardiac Rhythm SR .

## 2023-03-06 NOTE — DIABETES MGMT
Consult noted over weekend for blood glucose mgmt after cardiac surgery. Per RN, has transitioned off insulin infusion with POC BG checks that remain in target. Not a diabetic pre op. Will discontinue consult and sign off on this patient. Thanks.        Signed By: David Escobedo, [id number] [facility]   Diabetes Health    March 6, 2023

## 2023-03-06 NOTE — PROGRESS NOTES
Problem: Self Care Deficits Care Plan (Adult)  Goal: *Acute Goals and Plan of Care (Insert Text)  Description: FUNCTIONAL STATUS PRIOR TO ADMISSION: Patient was independent and active without use of DME. Patient was independent for basic and instrumental ADLs. HOME SUPPORT: The patient lived with wife but did not require assist.    Occupational Therapy Goals  Initiated 3/5/2023  1. Patient will perform ADLs standing 5 mins without fatigue or LOB with supervision/set-up within 5 day(s). 2.  Patient will perform lower body ADLs with supervision/set-up within 5 day(s). 3.  Patient will perform gathering ADL items high and low 2/2 with supervision/set-up within 5 day(s). 4.  Patient will perform toilet transfers with supervision/set-up within 5 day(s). 5.  Patient will perform all aspects of toileting with supervision/set-up within 5 day(s). 6.  Patient will participate in cardiac/sternal upper extremity therapeutic exercise/activities to increase independence with ADLs with supervision/set-up for 5 minutes within 5 day(s). Outcome: Progressing Towards Goal   OCCUPATIONAL THERAPY TREATMENT  Patient: Freya Martin (64 y.o. male)  Date: 3/6/2023  Diagnosis: NSTEMI (non-ST elevated myocardial infarction) ([facility] [address] [I21.4] NSTEMI (non-ST elevated myocardial infarction) ([facility] [address]  Procedure(s) (LRB):  CORONARY ARTERY BYPASS GRAFTING X 3 WITH LIMA AND [place],AORTIC VALVE REPLACEMENT, ECC, MATTHIAS AND EPIAORTIC U/S BY DR ROWELL [facility] (N/A) 3 Days Post-Op  Precautions: Fall, Other (comment) (move in tube)  Chart, occupational therapy assessment, plan of care, and goals were reviewed. ASSESSMENT  Patient continues with skilled OT services and is progressing towards goals. Session limited by dizziness and nausea, RN aware and present throughout session. Session focusing on functional mobility without AD, able to complete increased hallway mobility however limited by persistent nausea requiring return to supine in bed. Skilled assist x2 required for line mgmt and to maintain patient safety. Patient with good pacing and educated on energy conservation techniques, fair recall of move in tube prec. During session patient able to successfully demo tailor sit positioning in prep for LB dressing, however still requiring increased assistance for ADL tasks. Patients vitals stable with activity, remained on 2L O2 via NC. Hopeful patient will require no more than HHOT at DC pending progress/activity tolerance. Patient is not verbalizing and is not demonstrating understanding of mindful-based movements (\"move in the tube\") principles of keeping UEs proximal to ribcage to prevent lateral pull on the sternum during load-bearing activities with visual, verbal, manual, and tactile cues required for compliance. Current Level of Function Impacting Discharge (ADLs): up to max A for LB ADLs, mod A UB ADLs    Other factors to consider for discharge: supportive spouse, CABG x3, AVR         PLAN :  Patient continues to benefit from skilled intervention to address the above impairments. Continue treatment per established plan of care to address goals. Recommend with staff: up to chair daily for meals, assist x1-2    Recommend next OT session: LB dressing, UB dressing    Recommendation for discharge: (in order for the patient to meet his/her long term goals)  Occupational therapy at least 2 days/week in the home with spousal support for ADLs    This discharge recommendation:  Has been made in collaboration with the attending provider and/or case management    IF patient discharges home will need the following DME: shower chair       SUBJECTIVE:   Patient stated I think walking around actually helped me [re: nausea].     OBJECTIVE DATA SUMMARY:   Cognitive/Behavioral Status:   AOx4        Functional Mobility and Transfers for ADLs:  Bed Mobility:  Sit to Supine:  Moderate assistance    Transfers:  Sit to Stand: Minimum assistance     Bed to Chair: Minimum assistance    Balance:  Sitting: Intact; With support  Standing: Impaired  Standing - Static: Good  Standing - Dynamic : Fair    ADL Intervention:       Lower Body Dressing Assistance  Socks: Maximum assistance    Patient instructed and educated on mindful movement principles based on Move in The Tube concept to include maintaining bilateral elbows close to rib cage when performing any load-bearing activity such as getting in/out of bed, pushing up from a chair, opening a door, or lifting a box. Patient was given a handout with diagrams of each correct/incorrect method of performing each of the above tasks. Patient instructed on the ability to utilize upper extremities outside the tube when doing any non-load bearing activity such as washing hair/body, brushing teeth, retrieving clothing items, or scratching your back. Patient encouraged to also perform upper extremity exercises \"outside of the tube\" to prevent scar tissue formation around sternal incision site. Patient instructed in detail about activities to heed with caution, allowing pain to be the guide. These activities include but are not limited to: mowing the lawn, riding a bike, walking a dog, lifting a child, workshop hobbies, golfing, sexual activity, vacuuming, fishing, scrubbing the floors, and moving furniture. Patient was given the [address] in the [place] handout to describe each of these activities in detail. Patient instructed no asymmetrical reaching over head to ensure B UEs when shoulders >90* i.e. reaching in cabinets and dressing. Instruction on upper body dressing techniques of over head, then arms through to decrease pain and unilateral shoulder flexion >90*. Instruction on the benefits of utilizing B UEs during functional tasks i.e. opening the fridge, stepping into the tub.        Pain:  Pt endorsing nausea during session and with activity     Activity Tolerance:   Fair    After treatment patient left in no apparent distress:   Supine in bed, Call bell within reach, and Caregiver / family present    COMMUNICATION/COLLABORATION:   The patients plan of care was discussed with: Physical therapist, Occupational therapist, and Registered nurse.      Lindy Gordon OT  Time Calculation: 23 mins

## 2023-03-06 NOTE — PROGRESS NOTES
Cardiac Surgery Care Coordinator-  Met with Rusty Sorensen. Reviewed plan of care and discussed goals for the day. Rusty Sorensen has a good understanding of his plan for the day. He is complaining of feeling lightheaded. Reinforced sternal precautions and encouraged continued use of the incentive spirometer. Will continue to follow for educational and emotional needs.

## 2023-03-07 ENCOUNTER — APPOINTMENT (OUTPATIENT)
Dept: GENERAL RADIOLOGY | Age: 78
DRG: 219 | End: 2023-03-07
Payer: MEDICARE

## 2023-03-07 ENCOUNTER — APPOINTMENT (OUTPATIENT)
Dept: NON INVASIVE DIAGNOSTICS | Age: 78
DRG: 219 | End: 2023-03-07
Payer: MEDICARE

## 2023-03-07 LAB
ALBUMIN SERPL-MCNC: 2.6 G/DL (ref 3.5–5)
ALBUMIN/GLOB SERPL: 0.9 (ref 1.1–2.2)
ALP SERPL-CCNC: 44 U/L (ref 45–117)
ALT SERPL-CCNC: 22 U/L (ref 12–78)
ANION GAP SERPL CALC-SCNC: 4 MMOL/L (ref 5–15)
AST SERPL-CCNC: 44 U/L (ref 15–37)
BILIRUB SERPL-MCNC: 0.6 MG/DL (ref 0.2–1)
BUN SERPL-MCNC: 23 MG/DL (ref 6–20)
BUN/CREAT SERPL: 33 (ref 12–20)
CALCIUM SERPL-MCNC: 8.3 MG/DL (ref 8.5–10.1)
CHLORIDE SERPL-SCNC: 102 MMOL/L (ref 97–108)
CO2 SERPL-SCNC: 31 MMOL/L (ref 21–32)
CREAT SERPL-MCNC: 0.7 MG/DL (ref 0.7–1.3)
ECHO AV AREA PEAK VELOCITY: 1.4 CM2
ECHO AV AREA PEAK VELOCITY: 1.4 CM2
ECHO AV AREA VTI: 1.6 CM2
ECHO AV AREA/BSA VTI: 0.7 CM2/M2
ECHO AV MEAN GRADIENT: 14 MMHG
ECHO AV MEAN VELOCITY: 1.7 M/S
ECHO AV PEAK GRADIENT: 28 MMHG
ECHO AV PEAK VELOCITY: 2.6 M/S
ECHO AV VTI: 47.6 CM
ECHO EST RA PRESSURE: 3 MMHG
ECHO LVOT AREA: 4.2 CM2
ECHO LVOT AV VTI INDEX: 0.39
ECHO LVOT DIAM: 2.3 CM
ECHO LVOT MEAN GRADIENT: 2 MMHG
ECHO LVOT PEAK GRADIENT: 3 MMHG
ECHO LVOT PEAK GRADIENT: 3 MMHG
ECHO LVOT PEAK VELOCITY: 0.9 M/S
ECHO LVOT PEAK VELOCITY: 0.9 M/S
ECHO LVOT STROKE VOLUME INDEX: 33.7 ML/M2
ECHO LVOT SV: 78.1 ML
ECHO LVOT VTI: 18.8 CM
ECHO RIGHT VENTRICULAR SYSTOLIC PRESSURE (RVSP): 29 MMHG
ECHO TV REGURGITANT MAX VELOCITY: 2.53 M/S
ECHO TV REGURGITANT PEAK GRADIENT: 26 MMHG
ERYTHROCYTE [DISTWIDTH] IN BLOOD BY AUTOMATED COUNT: 14 % (ref 11.5–14.5)
GLOBULIN SER CALC-MCNC: 2.8 G/DL (ref 2–4)
GLUCOSE BLD STRIP.AUTO-MCNC: NORMAL MG/DL (ref 65–117)
GLUCOSE SERPL-MCNC: 104 MG/DL (ref 65–100)
HCT VFR BLD AUTO: 24.8 % (ref 36.6–50.3)
HGB BLD-MCNC: 8.1 G/DL (ref 12.1–17)
MCH RBC QN AUTO: 29.2 PG (ref 26–34)
MCHC RBC AUTO-ENTMCNC: 32.7 G/DL (ref 30–36.5)
MCV RBC AUTO: 89.5 FL (ref 80–99)
NRBC # BLD: 0.04 K/UL (ref 0–0.01)
NRBC BLD-RTO: 0.5 PER 100 WBC
PLATELET # BLD AUTO: 93 K/UL (ref 150–400)
PMV BLD AUTO: 11.4 FL (ref 8.9–12.9)
POTASSIUM SERPL-SCNC: 3.8 MMOL/L (ref 3.5–5.1)
PROT SERPL-MCNC: 5.4 G/DL (ref 6.4–8.2)
RBC # BLD AUTO: 2.77 M/UL (ref 4.1–5.7)
SERVICE CMNT-IMP: NORMAL
SODIUM SERPL-SCNC: 137 MMOL/L (ref 136–145)
WBC # BLD AUTO: 8.3 K/UL (ref 4.1–11.1)

## 2023-03-07 PROCEDURE — C8923 2D TTE W OR W/O FOL W/CON,CO: HCPCS

## 2023-03-07 PROCEDURE — 74011000250 HC RX REV CODE- 250: Performed by: PHYSICIAN ASSISTANT

## 2023-03-07 PROCEDURE — 71046 X-RAY EXAM CHEST 2 VIEWS: CPT

## 2023-03-07 PROCEDURE — 80053 COMPREHEN METABOLIC PANEL: CPT

## 2023-03-07 PROCEDURE — 93321 DOPPLER ECHO F-UP/LMTD STD: CPT | Performed by: INTERNAL MEDICINE

## 2023-03-07 PROCEDURE — 74011000250 HC RX REV CODE- 250: Performed by: THORACIC SURGERY (CARDIOTHORACIC VASCULAR SURGERY)

## 2023-03-07 PROCEDURE — 65660000001 HC RM ICU INTERMED STEPDOWN

## 2023-03-07 PROCEDURE — 74011250637 HC RX REV CODE- 250/637: Performed by: PHYSICIAN ASSISTANT

## 2023-03-07 PROCEDURE — 74011250637 HC RX REV CODE- 250/637

## 2023-03-07 PROCEDURE — 85027 COMPLETE CBC AUTOMATED: CPT

## 2023-03-07 PROCEDURE — 93325 DOPPLER ECHO COLOR FLOW MAPG: CPT | Performed by: INTERNAL MEDICINE

## 2023-03-07 PROCEDURE — 74011250637 HC RX REV CODE- 250/637: Performed by: STUDENT IN AN ORGANIZED HEALTH CARE EDUCATION/TRAINING PROGRAM

## 2023-03-07 PROCEDURE — 36415 COLL VENOUS BLD VENIPUNCTURE: CPT

## 2023-03-07 PROCEDURE — 74011000250 HC RX REV CODE- 250: Performed by: ANESTHESIOLOGY

## 2023-03-07 PROCEDURE — 74011000250 HC RX REV CODE- 250

## 2023-03-07 PROCEDURE — 93308 TTE F-UP OR LMTD: CPT | Performed by: INTERNAL MEDICINE

## 2023-03-07 PROCEDURE — 74011250636 HC RX REV CODE- 250/636: Performed by: THORACIC SURGERY (CARDIOTHORACIC VASCULAR SURGERY)

## 2023-03-07 PROCEDURE — 97116 GAIT TRAINING THERAPY: CPT

## 2023-03-07 PROCEDURE — 97535 SELF CARE MNGMENT TRAINING: CPT

## 2023-03-07 RX ORDER — POTASSIUM CHLORIDE 750 MG/1
20 TABLET, FILM COATED, EXTENDED RELEASE ORAL ONCE
Status: COMPLETED | OUTPATIENT
Start: 2023-03-07 | End: 2023-03-07

## 2023-03-07 RX ORDER — FACIAL-BODY WIPES
10 EACH TOPICAL ONCE
Status: COMPLETED | OUTPATIENT
Start: 2023-03-07 | End: 2023-03-07

## 2023-03-07 RX ORDER — FUROSEMIDE 40 MG/1
40 TABLET ORAL 2 TIMES DAILY
Status: DISCONTINUED | OUTPATIENT
Start: 2023-03-07 | End: 2023-03-08

## 2023-03-07 RX ADMIN — SODIUM CHLORIDE, PRESERVATIVE FREE 10 ML: 5 INJECTION INTRAVENOUS at 21:10

## 2023-03-07 RX ADMIN — PERFLUTREN 1.5 ML: 6.52 INJECTION, SUSPENSION INTRAVENOUS at 11:00

## 2023-03-07 RX ADMIN — PANTOPRAZOLE SODIUM 40 MG: 40 TABLET, DELAYED RELEASE ORAL at 07:30

## 2023-03-07 RX ADMIN — SODIUM CHLORIDE, PRESERVATIVE FREE 10 ML: 5 INJECTION INTRAVENOUS at 16:25

## 2023-03-07 RX ADMIN — POTASSIUM CHLORIDE 20 MEQ: 750 TABLET, FILM COATED, EXTENDED RELEASE ORAL at 08:34

## 2023-03-07 RX ADMIN — POLYETHYLENE GLYCOL 3350 17 G: 17 POWDER, FOR SOLUTION ORAL at 08:33

## 2023-03-07 RX ADMIN — SODIUM CHLORIDE, PRESERVATIVE FREE 10 ML: 5 INJECTION INTRAVENOUS at 21:09

## 2023-03-07 RX ADMIN — BISACODYL 10 MG: 10 SUPPOSITORY RECTAL at 10:52

## 2023-03-07 RX ADMIN — SODIUM CHLORIDE, PRESERVATIVE FREE 10 ML: 5 INJECTION INTRAVENOUS at 13:01

## 2023-03-07 RX ADMIN — ROSUVASTATIN 20 MG: 10 TABLET, FILM COATED ORAL at 21:09

## 2023-03-07 RX ADMIN — FUROSEMIDE 40 MG: 40 TABLET ORAL at 18:04

## 2023-03-07 RX ADMIN — EZETIMIBE 10 MG: 10 TABLET ORAL at 08:34

## 2023-03-07 RX ADMIN — ASPIRIN 81 MG CHEWABLE TABLET 81 MG: 81 TABLET CHEWABLE at 08:34

## 2023-03-07 RX ADMIN — CHLORHEXIDINE GLUCONATE 10 ML: 1.2 RINSE ORAL at 20:22

## 2023-03-07 RX ADMIN — Medication 400 MG: at 08:34

## 2023-03-07 RX ADMIN — CARVEDILOL 3.12 MG: 3.12 TABLET, FILM COATED ORAL at 08:34

## 2023-03-07 RX ADMIN — Medication 400 MG: at 18:04

## 2023-03-07 RX ADMIN — SENNOSIDES AND DOCUSATE SODIUM 1 TABLET: 50; 8.6 TABLET ORAL at 08:33

## 2023-03-07 RX ADMIN — Medication 3 MG: at 20:21

## 2023-03-07 RX ADMIN — MUPIROCIN: 20 OINTMENT TOPICAL at 18:05

## 2023-03-07 RX ADMIN — FUROSEMIDE 40 MG: 40 TABLET ORAL at 08:34

## 2023-03-07 RX ADMIN — SENNOSIDES AND DOCUSATE SODIUM 1 TABLET: 50; 8.6 TABLET ORAL at 18:04

## 2023-03-07 RX ADMIN — CHLORHEXIDINE GLUCONATE 10 ML: 1.2 RINSE ORAL at 08:35

## 2023-03-07 RX ADMIN — AMIODARONE HYDROCHLORIDE 400 MG: 200 TABLET ORAL at 20:21

## 2023-03-07 RX ADMIN — AMIODARONE HYDROCHLORIDE 400 MG: 200 TABLET ORAL at 08:34

## 2023-03-07 RX ADMIN — MUPIROCIN: 20 OINTMENT TOPICAL at 08:35

## 2023-03-07 RX ADMIN — SODIUM CHLORIDE, POTASSIUM CHLORIDE, SODIUM LACTATE AND CALCIUM CHLORIDE 500 ML: 600; 310; 30; 20 INJECTION, SOLUTION INTRAVENOUS at 16:24

## 2023-03-07 NOTE — PROGRESS NOTES
Transitions of Care Plan  RUR: 13% - low  Clinical Update: s/p CABG - post op day 4   Consults: Therapy  Baseline: independent without DME; resides w wife at [facility]  Barriers to Discharge: medical  Disposition:  Home Health: order to [facility] - INPATIENT   Estimated Discharge Date: 3/8/23    CM spoke with patient and wife at bedside. Confirmed both reside in independent living at [facility]. Preferred pharmacy for discharge plan is [facility] [place]. Patient may transfer prescriptions to [place] [id number] in the future. Wife to transport patient home at time of discharge. Transition of Care Plan:     The Plan for Transition of Care is related to the following treatment goals: Home Health - 1) [facility] - INPATIENT    The Patient and/or patient representative Patient was provided with a choice of provider and agrees  with the discharge plan. Yes [x] No []    A Freedom of choice list was provided with basic dialogue that supports the patient's individualized plan of care/goals and shares the quality data associated with the providers. Yes [x] No []    Home Health order sent to [facility] - INPATIENT via [address]. Disposition Plan:  Home Health:  [facility] - INPATIENT  Transportation: Wife    Burgess Cunha, MPH  Care Manager l [employer]  Available via [employer] or

## 2023-03-07 NOTE — PROGRESS NOTES
Cardiac Surgery Care Coordinator- Met with Jame Lopez, reviewed plan of care and discussed potential discharge date. Reinforced sternal precautions and encouraged continued use of the incentive spirometer. Reviewed goals for the day and emphasized the importance of increased activity to meet discharge goals. Will continue to follow for educational and emotional needs. 200- Met with Mr and Mrs Earle Smith, reviewed plan of care and discussed potential discharge date. Began discharge teaching and encouraged them to verbalize.  to discuss home health options.

## 2023-03-07 NOTE — PROGRESS NOTES
Problem: Mobility Impaired (Adult and Pediatric)  Goal: *Acute Goals and Plan of Care (Insert Text)  Description: FUNCTIONAL STATUS PRIOR TO ADMISSION: Patient was independent and active without use of DME.     HOME SUPPORT PRIOR TO ADMISSION: The patient lived with wife but did not require assist.    Physical Therapy Goals  Initiated 3/5/2023  1. Patient will move from supine to sit and sit to supine  and scoot up and down in bed with modified independence within 5 days. 2.  Patient will perform sit to/from stand with modified independence within 5 days. 3.  Patient will ambulate 300 feet with least restrictive assistive device and supervision/set-up within 5 days. 4.  Patient will ascend/descend 2 stairs with one handrail(s) with minimal assistance/contact guard assist within 5 days. 5.  Patient will perform cardiac exercises per protocol with supervision/set-up within 5 days. 6.  Patient will verbally recall and functionally demonstrate mindful-based movements (\"move in the tube\") principles without cues within 5 days. Outcome: Progressing Towards Goal     PHYSICAL THERAPY TREATMENT  Patient: Radha Lei (1 y.o. male)  Date: 3/7/2023  Diagnosis: NSTEMI (non-ST elevated myocardial infarction) ([facility] [address] [I21.4] NSTEMI (non-ST elevated myocardial infarction) ([facility] [address]  Procedure(s) (LRB):  CORONARY ARTERY BYPASS GRAFTING X 3 WITH LIMA AND [place],AORTIC VALVE REPLACEMENT, ECC, MATTHIAS AND EPIAORTIC U/S BY DR ROWELL [facility] (N/A) 4 Days Post-Op  Precautions: Fall (move in the tube)  Chart, physical therapy assessment, plan of care and goals were reviewed. ASSESSMENT  Patient continues with skilled PT services and is progressing towards goals. Patient received seated on [facility] and agreeable to therapy. Able to transfer and ambulate with CGA and no overt LOB. Stable on room air throughout and no significant SOB noted. Increased assist needed to return to bed and reposition.  Educated on importance of frequent mobilization once d/c'ed home and enrollment in cardiac rehab. Recommend HHPT. Patient is not verbalizing and is demonstrating understanding of mindful-based movements (\"move in the tube\") principles of keeping UEs proximal to ribcage to prevent lateral pull on the sternum during load-bearing activities with visual, verbal, and manual cues required for compliance. Current Level of Function Impacting Discharge (mobility/balance): min A legs into bed     Other factors to consider for discharge: lives with wife          PLAN :  Patient continues to benefit from skilled intervention to address the above impairments. Continue treatment per established plan of care. to address goals. Recommendation for discharge: (in order for the patient to meet his/her long term goals)  Physical therapy at least 2 days/week in the home     This discharge recommendation:  Has been made in collaboration with the attending provider and/or case management    IF patient discharges home will need the following DME: none       SUBJECTIVE:   Patient stated I'm not having any luck.  re: BM on BSC     OBJECTIVE DATA SUMMARY:   Patient mobilized on continuous portable monitor/telemetry.   Critical Behavior:  Neurologic State: Alert  Orientation Level: Oriented X4  Cognition: Appropriate for age attention/concentration  Safety/Judgement: Awareness of environment, Insight into deficits  Functional Mobility Training:  Bed Mobility:  Rolling: Minimum assistance  Supine to Sit: Minimum assistance  Sit to Supine: Minimum assistance  Scooting: Supervision  Transfers:  Sit to Stand: Contact guard assistance  Stand to Sit: Contact guard assistance  Bed to Chair: Contact guard assistance (HHAx 1)  Balance:  Sitting: Intact  Sitting - Static: Good (unsupported)  Sitting - Dynamic: Good (unsupported)  Standing: Intact  Standing - Static: Good  Standing - Dynamic : Good  Ambulation/Gait Training:  Distance (ft): 100 Feet (ft)  Assistive Device: Gait belt  Ambulation - Level of Assistance: Contact guard assistance  Gait Abnormalities: Decreased step clearance  Base of Support: Widened    Cardiac diagnosis intervention:  Patient instructed and educated on mindful movement principles based on Move in The Tube concept to include maintaining bilateral elbows close to rib cage when performing any load-bearing activity such as getting in/out of bed, pushing up from a chair, opening a door, or lifting a box. Patient was given a handout with diagrams of each correct/incorrect method of performing each of the above tasks. Activity Tolerance:   Good and SpO2 stable on RA    After treatment patient left in no apparent distress:   Supine in bed, Call bell within reach, and Side rails x 3    COMMUNICATION/COLLABORATION:   The patients plan of care was discussed with: Registered nurse.      Joy Wilhelm PT, DPT   Time Calculation: 11 mins

## 2023-03-07 NOTE — PROGRESS NOTES
Problem: Patient Education: Go to Patient Education Activity  Goal: Patient/Family Education  Outcome: Progressing Towards Goal     Problem: Unstable Angina/NSTEMI: Discharge Outcomes  Goal: *Hemodynamically stable  Outcome: Progressing Towards Goal  Goal: *Stable cardiac rhythm  Outcome: Progressing Towards Goal  Goal: *Lungs clear or at baseline  Outcome: Progressing Towards Goal  Goal: *Optimal pain control at patient's stated goal  Outcome: Progressing Towards Goal  Goal: *Identifies cardiac risk factors  Outcome: Progressing Towards Goal  Goal: *Verbalizes home exercise program, activity guidelines, cardiac precautions  Outcome: Progressing Towards Goal  Goal: *Verbalizes understanding and describes prescribed diet  Outcome: Progressing Towards Goal  Goal: *Verbalizes name, dosage, time, side effects, and number of days to continue medications  Outcome: Progressing Towards Goal  Goal: *Anxiety reduced or absent  Outcome: Progressing Towards Goal  Goal: *Understands and describes signs and symptoms to report to providers(Stroke Metric)  Outcome: Progressing Towards Goal  Goal: *Describes follow-up/return visits to physicians  Outcome: Progressing Towards Goal  Goal: *Describes available resources and support systems  Outcome: Progressing Towards Goal  Goal: *Influenza immunization  Outcome: Progressing Towards Goal  Goal: *Pneumococcal immunization  Outcome: Progressing Towards Goal  Goal: *Describes smoking cessation resources  Outcome: Progressing Towards Goal     Problem: CABG: Post-Op Day 3  Goal: Off Pathway (Use only if patient is Off Pathway)  Outcome: Progressing Towards Goal  Goal: Activity/Safety  Outcome: Progressing Towards Goal  Goal: Consults, if ordered  Outcome: Progressing Towards Goal  Goal: Diagnostic Test/Procedures  Outcome: Progressing Towards Goal  Goal: Nutrition/Diet  Outcome: Progressing Towards Goal  Goal: Discharge Planning  Outcome: Progressing Towards Goal  Goal: Medications  Outcome: Progressing Towards Goal  Goal: Respiratory  Outcome: Progressing Towards Goal  Goal: Treatments/Interventions/Procedures  Outcome: Progressing Towards Goal  Goal: Psychosocial  Outcome: Progressing Towards Goal  Goal: *Hemodynamically stable  Outcome: Progressing Towards Goal  Goal: *Lungs clear or at baseline  Outcome: Progressing Towards Goal  Goal: *Optimal pain control at patient's stated goal  Outcome: Progressing Towards Goal  Goal: *Incisions without signs and symptoms of wound complication  Outcome: Progressing Towards Goal  Goal: *Demonstrates progressive activity  Outcome: Progressing Towards Goal  Goal: *Tolerating diet  Outcome: Progressing Towards Goal     Problem: CABG: Post-Op Day 4  Goal: Off Pathway (Use only if patient is Off Pathway)  Outcome: Progressing Towards Goal  Goal: Activity/Safety  Outcome: Progressing Towards Goal  Goal: Diagnostic Test/Procedures  Outcome: Progressing Towards Goal  Goal: Nutrition/Diet  Outcome: Progressing Towards Goal  Goal: Medications  Outcome: Progressing Towards Goal  Goal: Discharge Planning  Outcome: Progressing Towards Goal  Goal: Respiratory  Outcome: Progressing Towards Goal  Goal: Treatments/Interventions/Procedures  Outcome: Progressing Towards Goal  Goal: Psychosocial  Outcome: Progressing Towards Goal  Goal: *Hemodynamically stable  Outcome: Progressing Towards Goal  Goal: *Lungs clear or at baseline  Outcome: Progressing Towards Goal  Goal: *Optimal pain control at patient's stated goal  Outcome: Progressing Towards Goal  Goal: *Incisions without signs and symptoms of wound complication  Outcome: Progressing Towards Goal  Goal: *Demonstrates progressive activity  Outcome: Progressing Towards Goal  Goal: *Tolerating diet  Outcome: Progressing Towards Goal     Problem: CABG: Post-Op Day 5  Goal: Off Pathway (Use only if patient is Off Pathway)  Outcome: Progressing Towards Goal  Goal: Activity/Safety  Outcome: Progressing Towards Goal  Goal: Diagnostic Test/Procedures  Outcome: Progressing Towards Goal  Goal: Nutrition/Diet  Outcome: Progressing Towards Goal  Goal: Discharge Planning  Outcome: Progressing Towards Goal  Goal: Medications  Outcome: Progressing Towards Goal  Goal: Respiratory  Outcome: Progressing Towards Goal  Goal: Treatments/Interventions/Procedures  Outcome: Progressing Towards Goal  Goal: Psychosocial  Outcome: Progressing Towards Goal     Problem: CABG: Discharge Outcomes  Goal: *Hemodynamically stable  Outcome: Progressing Towards Goal  Goal: *Stable cardiac rhythm  Outcome: Progressing Towards Goal  Goal: *Lungs clear or at baseline  Outcome: Progressing Towards Goal  Goal: *Demonstrates ability to perform prescribed activity without shortness of breath or discomfort  Outcome: Progressing Towards Goal  Goal: *Verbalizes home exercise program, activity guidelines, cardiac precautions  Outcome: Progressing Towards Goal  Goal: *Optimal pain control at patient's stated goal  Outcome: Progressing Towards Goal  Goal: *Verbalizes understanding and describes prescribed diet  Outcome: Progressing Towards Goal  Goal: *Verbalizes name, dosage, time, side effects, and number of days to continue medications  Outcome: Progressing Towards Goal  Goal: *Weight  is stable  Outcome: Progressing Towards Goal  Goal: *No signs and symptoms of infection or wound complications  Outcome: Progressing Towards Goal  Goal: *Anxiety reduced or absent  Outcome: Progressing Towards Goal  Goal: *Verbalizes and demonstrates incision care  Outcome: Progressing Towards Goal  Goal: *Understands and describes signs and symptoms to report to providers(Stroke Metric)  Outcome: Progressing Towards Goal  Goal: *Describes follow-up/return visits to physicians  Outcome: Progressing Towards Goal  Goal: *Describes available resources and support systems  Outcome: Progressing Towards Goal  Goal: *Expresses feelings about diagnosis and surgery  Outcome: Progressing Towards Goal  Goal: *Influenza immunization  Outcome: Progressing Towards Goal  Goal: *Pneumococcal immunization  Outcome: Progressing Towards Goal     Problem: Falls - Risk of  Goal: *Absence of Falls  Description: Document Joie Fall Risk and appropriate interventions in the flowsheet.   Outcome: Progressing Towards Goal  Note: Fall Risk Interventions:                                Problem: Patient Education: Go to Patient Education Activity  Goal: Patient/Family Education  Outcome: Progressing Towards Goal     Problem: Non-Violent Restraints  Goal: Removal from restraints as soon as assessed to be safe  Outcome: Progressing Towards Goal  Goal: No harm/injury to patient while restraints in use  Outcome: Progressing Towards Goal  Goal: Patient's dignity will be maintained  Outcome: Progressing Towards Goal  Goal: Patient Interventions  Outcome: Progressing Towards Goal     Problem: Ventilator Management  Goal: *Adequate oxygenation and ventilation  Outcome: Progressing Towards Goal  Goal: *Patient maintains clear airway/free of aspiration  Outcome: Progressing Towards Goal  Goal: *Absence of infection signs and symptoms  Outcome: Progressing Towards Goal  Goal: *Normal spontaneous ventilation  Outcome: Progressing Towards Goal

## 2023-03-07 NOTE — PROGRESS NOTES
Rounded on Muslim patients and provided Anointing of the Sick  and Communion at request of patient.     Alexandria Mcnair

## 2023-03-07 NOTE — CARDIO/PULMONARY
Cardiac Rehab: CABG education folder at bedside. Met with Karlee Yoon and his wife to begin cardiac surgery post discharge instructions and to discuss participation in the Cardiac Rehab Program.     Educated using teach back method. Reviewed the use of bear for sternal support, daily weight and temperature monitoring, use of incentive spirometer. Karlee Yoon was able to demonstrate proper use of incentive spirometer, achieving 750 ml. .     Discussed Cardiac Rehab Program format, benefits, and encouraged participation. The OP CR at [facility] will follow up by phone after discharge to enroll. General questions answered. Karlee Yoon verbalized understanding.         Shruti Nicole RN

## 2023-03-07 NOTE — PROGRESS NOTES
Problem: Self Care Deficits Care Plan (Adult)  Goal: *Acute Goals and Plan of Care (Insert Text)  Description: FUNCTIONAL STATUS PRIOR TO ADMISSION: Patient was independent and active without use of DME. Patient was independent for basic and instrumental ADLs. HOME SUPPORT: The patient lived with wife but did not require assist.    Occupational Therapy Goals  Initiated 3/5/2023  1. Patient will perform ADLs standing 5 mins without fatigue or LOB with supervision/set-up within 5 day(s). 2.  Patient will perform lower body ADLs with supervision/set-up within 5 day(s). 3.  Patient will perform gathering ADL items high and low 2/2 with supervision/set-up within 5 day(s). 4.  Patient will perform toilet transfers with supervision/set-up within 5 day(s). 5.  Patient will perform all aspects of toileting with supervision/set-up within 5 day(s). 6.  Patient will participate in cardiac/sternal upper extremity therapeutic exercise/activities to increase independence with ADLs with supervision/set-up for 5 minutes within 5 day(s). Outcome: Progressing Towards Goal   OCCUPATIONAL THERAPY TREATMENT  Patient: Micaela Shepard (61 y.o. male)  Date: 3/7/2023  Diagnosis: NSTEMI (non-ST elevated myocardial infarction) ([facility] [address] [I21.4] NSTEMI (non-ST elevated myocardial infarction) ([facility] [address]  Procedure(s) (LRB):  CORONARY ARTERY BYPASS GRAFTING X 3 WITH LIMA AND LESVGH,AORTIC VALVE REPLACEMENT, ECC, MATTHIAS AND EPIAORTIC U/S BY DR ROWELL [facility] (N/A) 4 Days Post-Op  Precautions: Fall, Other (comment) (move in tube)  Chart, occupational therapy assessment, plan of care, and goals were reviewed. ASSESSMENT  Patient continues with skilled OT services and is progressing towards goals. Patient with improving adherence and understanding of move in tube precautions during Adl tasks on this date. Able to engage in adaptive LB dressing using sock aid and reacher, increased time requiring for initial education.  Patient with good carryover, able to don socks initially with min A progressing to SBA. Handout provided outlining Adls following sternotomy. Following dressing patient engaged in standing cardiac exercises with cueing for rest break initiation. Patients vital stable with activity, SpO2 >90% on RA throughout. Transitioned to recliner chair at conclusion of session, all needs met. Patient would benefit from cardiac shower prior to DC pending transfer to step down unit. Recommend [facility]OT at [facility] with family support. Patient is not verbalizing and is not demonstrating understanding of mindful-based movements (\"move in the tube\") principles of keeping UEs proximal to ribcage to prevent lateral pull on the sternum during load-bearing activities with visual, verbal, manual, and tactile cues required for compliance. Current Level of Function Impacting Discharge (ADLs): SPV - min A    Other factors to consider for discharge: CABG, AVR         PLAN :  Patient continues to benefit from skilled intervention to address the above impairments. Continue treatment per established plan of care to address goals. Recommend with staff: up to chair for meals, cardiac exercises 2-3x/ day    Recommend next OT session: sternal incision hygiene, cardiac shower pending transfer to CVSU    Recommendation for discharge: (in order for the patient to meet his/her long term goals)  Occupational therapy at least 2 days/week in the home     This discharge recommendation:  Has been made in collaboration with the attending provider and/or case management    IF patient discharges home will need the following DME: patient owns DME required for discharge       SUBJECTIVE:   Patient stated Melanie rutherford for being patient with me.     OBJECTIVE DATA SUMMARY:   Cognitive/Behavioral Status:  Neurologic State: Alert  Orientation Level: Oriented X4  Cognition: Appropriate for age attention/concentration             Functional Mobility and Transfers for ADLs:  Bed Mobility:  Rolling: Minimum assistance  Supine to Sit: Minimum assistance  Scooting: Supervision    Transfers:  Sit to Stand: Minimum assistance     Bed to Chair: Contact guard assistance (HHAx 1)    Balance:  Sitting: Intact  Sitting - Static: Good (unsupported)  Sitting - Dynamic: Good (unsupported)  Standing: Impaired  Standing - Static: Good  Standing - Dynamic : Good    ADL Intervention:          Lower Body Dressing Assistance  Dressing Assistance: Stand-by assistance  Socks: Stand-by assistance  Adaptive Equipment Used: Reacher;Sock aid     Patient instructed and educated on mindful movement principles based on Move in The Tube concept to include maintaining bilateral elbows close to rib cage when performing any load-bearing activity such as getting in/out of bed, pushing up from a chair, opening a door, or lifting a box. Patient was given a handout with diagrams of each correct/incorrect method of performing each of the above tasks. Patient instructed on the ability to utilize upper extremities outside the tube when doing any non-load bearing activity such as washing hair/body, brushing teeth, retrieving clothing items, or scratching your back. Patient encouraged to also perform upper extremity exercises \"outside of the tube\" to prevent scar tissue formation around sternal incision site. Patient instructed in detail about activities to heed with caution, allowing pain to be the guide. These activities include but are not limited to: mowing the lawn, riding a bike, walking a dog, lifting a child, workshop hobbies, golfing, sexual activity, vacuuming, fishing, scrubbing the floors, and moving furniture. Patient was given the [address] in the [place] handout to describe each of these activities in detail. Patient instructed no asymmetrical reaching over head to ensure B UEs when shoulders >90* i.e. reaching in cabinets and dressing.  Instruction on upper body dressing techniques of over head, then arms through to decrease pain and unilateral shoulder flexion >90*. Instruction on the benefits of utilizing B UEs during functional tasks i.e. opening the fridge, stepping into the tub. Instruction if continued pain at home with shoulder IR for BM hygiene can use wet wipes and toilet tongs PRN. Avoid valsalva maneuvers. May have to adjust home setup to increase ease with items closer to waist height to prevent deep bending and the automatic  of asymmetrical UE WB/pushing for stabilization during bending. Benefit to don clothing tailor sitting and don all clothing while sitting prior to standing. Patient demonstrated lower body dressing with Stand-by assistance and Minimum assistance. Instruction and indicated understanding on the benefits of loose clothing throughout to accommodate for post surgical swelling, decreased ROM and increased pain. Instruction and indicated understanding the technique of pull over shirt versus front open clothing. Increase activity tolerance for home, work, and sexual intercourse by pacing self with increasing the arm exercises, sitting duration, frequency OOB, walking, standing, and ADLs. Instructed and indicated understanding of s/s of too much activity, how to respond to s/s safely. Therapeutic Exercises:   Patient instructed on the benefits and demonstrated cardiac exercises while standing with Supervision. Instructed and indicated understanding on how to progress reps, sets against gravity, pacing through progressive muscle strengthening standing based on surgeon clearance for more weight in prep for basic and instrumental ADLs. Instruction on the use of household items in place of weights as needed.     CARDIAC   EXERCISE    Sets    Reps    Active  Active Assist    Passive  Self ROM    Comments    Shoulder flexion  1  5   [x]                            []                             []                             []                                Shoulder abduction  1  5  [x] []                             []                             []                                Scapular elevation  1  5  [x]                             []                              []                             []                                Scapular retraction  1  5  [x]                             []                             []                             []                                Trunk rotation  1  5  [x]                             []                             []                             []                                Trunk sidebending  1  5  [x]                             []                              []                             []                                          Pain:  Pt did not endorse pain during session    Activity Tolerance:   Fair and SpO2 stable on RA    After treatment patient left in no apparent distress:   Sitting in chair and Call bell within reach, BLE elevated    COMMUNICATION/COLLABORATION:   The patients plan of care was discussed with: Physical therapist, Occupational therapist, and Registered nurse.      Lindy Godron OT  Time Calculation: 29 mins

## 2023-03-07 NOTE — PROGRESS NOTES
Bedside and Verbal shift change report given to Duy Micah Acuna (oncoming nurse) by Alok Gilbert RN (offgoing nurse). Report included the following information SBAR, OR Summary, Intake/Output, MAR, and Cardiac Rhythm NSR . Uneventful night    Amio gtt stopped at 23:50    CHG bath    Chest tube site dressing changed    OOB w/o issue, minimal dizziness    Bedside and Verbal shift change report given to Keerthi RN (oncoming nurse) by Carina Garibay RN (offgoing nurse). Report included the following information SBAR, OR Summary, and Intake/Output.

## 2023-03-07 NOTE — PROGRESS NOTES
[facility] ICU Progress Note    Admit Date: 2023  POD:  4 Days Post-Op    Procedure:  Procedure(s):  CORONARY ARTERY BYPASS GRAFTING X 3 WITH LIMA AND KIRK,AORTIC VALVE REPLACEMENT, ECC, MATTHIAS AND EPIAORTIC U/S BY DR ROWELL [facility]        Subjective:   Pt seen with Dr. Ced Mcdonough. Tmax 37.2, on 2L NC, SR 60s. Reports nausea has improved today. Objective:   Pt synopsis:  3/6 POD3: afib rvr, amio bolus and gtt for 12 hours, PO amio initiated. Chest tubes removed. Delined, transfer orders in. On NC. Diureses with Lasix 20 mg IV, then redosed with 40 mg in the afternoon  3/7 POD4: Awaiting pa/lat. Will diurese with Lasix 40 mg PO bid today. Wean O2 this am, hopefully go home tomorrow depending on O2. Needs BM - will give suppository this am.    Vitals:  Blood pressure 115/65, pulse 67, temperature 99.3 °F (37.4 °C), resp. rate 13, height 5' 11\" (1.803 m), weight 249 lb 12.8 oz (113.3 kg), SpO2 96 %. Temp (24hrs), Av.9 °F (37.2 °C), Min:98.6 °F (37 °C), Max:99.3 °F (37.4 °C)      EKG/Rhythm:  SR    Oxygen Therapy: 2L NC      CXR: awaiting pa/lat this am    CXR Results  (Last 48 hours)                 23 0446  XR CHEST PORT Final result    Impression:  Bibasilar atelectasis, unchanged. Narrative:  INDICATION: postop heart       EXAMINATION:  AP CHEST, PORTABLE       COMPARISON: 3/5/2023       FINDINGS: Single AP portable view of the chest demonstrates no change in   position of the lines and tubes. The cardiomediastinal silhouette is unchanged. Persistent bibasilar atelectasis. No pulmonary edema or pneumothorax. Admission Weight: Last Weight   Weight: 237 lb 14 oz (107.9 kg) Weight: 249 lb 12.8 oz (113.3 kg)     Intake / Output / Drain:  Current Shift: No intake/output data recorded.   Last 24 hrs.:   Intake/Output Summary (Last 24 hours) at 3/7/2023 0826  Last data filed at 3/6/2023 2351  Gross per 24 hour   Intake 612.67 ml   Output 1935 ml   Net -1322.33 ml       EXAM:  General:  Sitting up in chair, in no distress                                                                                           Lungs:   Clear/diminished to auscultation bilaterally. Incision:  No erythema, drainage or swelling. Heart:  Regular rate and rhythm, S1, S2 normal, no murmur, click, rub or gallop. Abdomen:   Soft, non-tender. Bowel sounds normal. No masses,  No organomegaly. + flatus, - BM   Extremities:  Mild 1/2 + edema. PPP. Warm   Neurologic:  Gross motor and sensory apparatus intact. Equal strength, clear speech     Labs:   Recent Labs     03/07/23  0434 03/06/23  0829   WBC 8.3  --    HGB 8.1*  --    HCT 24.8*  --    PLT 93*  --      --    K 3.8  --    BUN 23*  --    CREA 0.70  --    *  --    GLUCPOC  --  112        Assessment:     Principal Problem:    NSTEMI (non-ST elevated myocardial infarction) ([facility] [address] (2/24/2023)    Active Problems:    S/P AVR (3/3/2023)       Plan/Recommendations/Medical Decision Making:     CAD/NSTEMI: OSH, + troponins, cath results with MVD  - Continue ASA 81mg and crestor, and zetia  - Continue Coreg 3.125mg BID  - chest tubes removed 3/6, cont wires    S/p AVR tissue: Asymptomatic pre-operatively, noted on ECHO  - Continue ASA  - echo ordered     Acute postoperative thrombocytopenia: Above transfusion threshold. - cont asa  - monitor cbc  - d/c famotidine, initiated PPI for GI prophylaxis     Acute postoperative blood loss anemia: Above transfusion threshold. Received several products POD0 and received 1 unit PRBC 3/5  - Monitor cbc     Transaminitis: AST downtrending  - monitor CMP, ok to cont statin and tylenol    HTN: PTA medications Micardis (telmisartan) 20mg daily.  Losartan was started inpatient preop.  - Initiate coreg 3.215 today     Dyslipidemia: PTA Crestor 10mg and Zetia 10mg  - Crestor increased to 20mg daily by Cardiology  - Continue Zetia     Hiatal Hernia: Noted on xray on admission  - No acute interventions     Nutrition: advance as tolerated  GI proph: protonix  Bowel reg: miralax, pericolace, bisacodyl today  DVT proph: SCDs, ambulate    Dispo: Transfer orders in. Wean O2 and needs a BM and then hopefully home tomorrow.     Signed By: Lavon Giles NP

## 2023-03-07 NOTE — PROGRESS NOTES
0830: PA and lateral xray    1000: Bedside Echo    1045: Bedside shift change report given to Nany Huntley (oncoming nurse) by Josue Barry RN (offgoing nurse). Report included the following information SBAR, Intake/Output, MAR, and Recent Results.

## 2023-03-07 NOTE — PROGRESS NOTES
1045: Report received from [facility]. Patient care assumed. 1400: Pt. Walked with PT.     4322: Pt's SBP 80's, Dr. Lars Alfonso aware, orders for 500 cc LR bolus. 1800: Pt. Walked with RN and PCT. 2000: Bedside and Verbal shift change report given to Lopez Blanco RN (oncoming nurse) by Raymond Davis RN (offgoing nurse). Report included the following information SBAR, Kardex, OR Summary, Intake/Output, MAR, Recent Results, Cardiac Rhythm NSR, and Alarm Parameters .

## 2023-03-08 ENCOUNTER — HOME HEALTH ADMISSION (OUTPATIENT)
Dept: HOME HEALTH SERVICES | Facility: HOME HEALTH | Age: 78
End: 2023-03-08
Payer: MEDICARE

## 2023-03-08 LAB
ANION GAP SERPL CALC-SCNC: 5 MMOL/L (ref 5–15)
BUN SERPL-MCNC: 26 MG/DL (ref 6–20)
BUN/CREAT SERPL: 32 (ref 12–20)
CALCIUM SERPL-MCNC: 8.3 MG/DL (ref 8.5–10.1)
CHLORIDE SERPL-SCNC: 101 MMOL/L (ref 97–108)
CO2 SERPL-SCNC: 31 MMOL/L (ref 21–32)
CREAT SERPL-MCNC: 0.82 MG/DL (ref 0.7–1.3)
ERYTHROCYTE [DISTWIDTH] IN BLOOD BY AUTOMATED COUNT: 13.8 % (ref 11.5–14.5)
GLUCOSE SERPL-MCNC: 106 MG/DL (ref 65–100)
HCT VFR BLD AUTO: 25 % (ref 36.6–50.3)
HGB BLD-MCNC: 8.2 G/DL (ref 12.1–17)
MAGNESIUM SERPL-MCNC: 2.4 MG/DL (ref 1.6–2.4)
MCH RBC QN AUTO: 29.1 PG (ref 26–34)
MCHC RBC AUTO-ENTMCNC: 32.8 G/DL (ref 30–36.5)
MCV RBC AUTO: 88.7 FL (ref 80–99)
NRBC # BLD: 0.04 K/UL (ref 0–0.01)
NRBC BLD-RTO: 0.5 PER 100 WBC
PLATELET # BLD AUTO: 113 K/UL (ref 150–400)
PMV BLD AUTO: 10.5 FL (ref 8.9–12.9)
POTASSIUM SERPL-SCNC: 3.6 MMOL/L (ref 3.5–5.1)
RBC # BLD AUTO: 2.82 M/UL (ref 4.1–5.7)
SODIUM SERPL-SCNC: 137 MMOL/L (ref 136–145)
WBC # BLD AUTO: 8.5 K/UL (ref 4.1–11.1)

## 2023-03-08 PROCEDURE — 97116 GAIT TRAINING THERAPY: CPT

## 2023-03-08 PROCEDURE — 97110 THERAPEUTIC EXERCISES: CPT

## 2023-03-08 PROCEDURE — 83735 ASSAY OF MAGNESIUM: CPT

## 2023-03-08 PROCEDURE — 74011000250 HC RX REV CODE- 250: Performed by: ANESTHESIOLOGY

## 2023-03-08 PROCEDURE — 74011000250 HC RX REV CODE- 250: Performed by: PHYSICIAN ASSISTANT

## 2023-03-08 PROCEDURE — 36415 COLL VENOUS BLD VENIPUNCTURE: CPT

## 2023-03-08 PROCEDURE — 74011000250 HC RX REV CODE- 250

## 2023-03-08 PROCEDURE — 85027 COMPLETE CBC AUTOMATED: CPT

## 2023-03-08 PROCEDURE — 74011250637 HC RX REV CODE- 250/637: Performed by: PHYSICIAN ASSISTANT

## 2023-03-08 PROCEDURE — 65660000001 HC RM ICU INTERMED STEPDOWN

## 2023-03-08 PROCEDURE — 80048 BASIC METABOLIC PNL TOTAL CA: CPT

## 2023-03-08 PROCEDURE — 97535 SELF CARE MNGMENT TRAINING: CPT

## 2023-03-08 PROCEDURE — 74011250637 HC RX REV CODE- 250/637

## 2023-03-08 RX ORDER — FUROSEMIDE 40 MG/1
40 TABLET ORAL DAILY
Status: DISCONTINUED | OUTPATIENT
Start: 2023-03-09 | End: 2023-03-09 | Stop reason: HOSPADM

## 2023-03-08 RX ORDER — POTASSIUM CHLORIDE 750 MG/1
40 TABLET, FILM COATED, EXTENDED RELEASE ORAL ONCE
Status: COMPLETED | OUTPATIENT
Start: 2023-03-08 | End: 2023-03-08

## 2023-03-08 RX ADMIN — EZETIMIBE 10 MG: 10 TABLET ORAL at 08:57

## 2023-03-08 RX ADMIN — FUROSEMIDE 40 MG: 40 TABLET ORAL at 08:58

## 2023-03-08 RX ADMIN — PANTOPRAZOLE SODIUM 40 MG: 40 TABLET, DELAYED RELEASE ORAL at 06:37

## 2023-03-08 RX ADMIN — SENNOSIDES AND DOCUSATE SODIUM 1 TABLET: 50; 8.6 TABLET ORAL at 17:53

## 2023-03-08 RX ADMIN — MUPIROCIN: 20 OINTMENT TOPICAL at 08:59

## 2023-03-08 RX ADMIN — POLYETHYLENE GLYCOL 3350 17 G: 17 POWDER, FOR SOLUTION ORAL at 08:57

## 2023-03-08 RX ADMIN — Medication 400 MG: at 08:58

## 2023-03-08 RX ADMIN — ASPIRIN 81 MG CHEWABLE TABLET 81 MG: 81 TABLET CHEWABLE at 08:58

## 2023-03-08 RX ADMIN — SENNOSIDES AND DOCUSATE SODIUM 1 TABLET: 50; 8.6 TABLET ORAL at 08:58

## 2023-03-08 RX ADMIN — SODIUM CHLORIDE, PRESERVATIVE FREE 10 ML: 5 INJECTION INTRAVENOUS at 06:36

## 2023-03-08 RX ADMIN — Medication 400 MG: at 17:53

## 2023-03-08 RX ADMIN — CHLORHEXIDINE GLUCONATE 10 ML: 1.2 RINSE ORAL at 08:58

## 2023-03-08 RX ADMIN — LACTULOSE 30 ML: 10 SOLUTION ORAL at 08:57

## 2023-03-08 RX ADMIN — AMIODARONE HYDROCHLORIDE 400 MG: 200 TABLET ORAL at 08:58

## 2023-03-08 RX ADMIN — AMIODARONE HYDROCHLORIDE 400 MG: 200 TABLET ORAL at 20:39

## 2023-03-08 RX ADMIN — SODIUM CHLORIDE, PRESERVATIVE FREE 10 ML: 5 INJECTION INTRAVENOUS at 14:47

## 2023-03-08 RX ADMIN — SODIUM CHLORIDE, PRESERVATIVE FREE 10 ML: 5 INJECTION INTRAVENOUS at 20:41

## 2023-03-08 RX ADMIN — ROSUVASTATIN 20 MG: 10 TABLET, FILM COATED ORAL at 20:40

## 2023-03-08 RX ADMIN — POTASSIUM CHLORIDE 40 MEQ: 750 TABLET, FILM COATED, EXTENDED RELEASE ORAL at 08:57

## 2023-03-08 NOTE — PROGRESS NOTES
Cardiac Surgery Care Coordinator- Met with Fanny Moncada reviewed plan of care and discussed upcoming discharge date. Reinforced sternal precautions and encouraged continued use of the incentive spirometer. Began discharge teaching and encouraged him to verbalize. Reviewed goals for the day and discussed the importance of increased activity and continued activity after discharge. Provided him with the red reminder bracelet, discussed the purpose of the bracelet. Will continue to follow for educational and emotional needs.

## 2023-03-08 NOTE — PROGRESS NOTES
0800: Bedside and Verbal shift change report given to Hannah0 Mila Davis (oncoming nurse) by Judy Truong (offgoing nurse). Report included the following information SBAR, Intake/Output, MAR, Recent Results, Cardiac Rhythm NSR, and Alarm Parameters . 1000: Pt walked hallway with PT    1130: Wires removed by Margo Armendariz NP    : Pt had BM    1630: TRANSFER - OUT REPORT:    Verbal report given to Maricruz (name) on Becca Cabrera  being transferred to CVSU (unit) for routine progression of care       Report consisted of patients Situation, Background, Assessment and   Recommendations(SBAR). Information from the following report(s) SBAR, OR Summary, Intake/Output, MAR, Recent Results, Cardiac Rhythm NSR, and Alarm Parameters  was reviewed with the receiving nurse. Opportunity for questions and clarification was provided.       Patient transported with:   Monitor  Registered Nurse

## 2023-03-08 NOTE — PROGRESS NOTES
[facility] ICU Progress Note    Admit Date: 2023  POD:  6 Days Post-Op    Procedure:  Procedure(s):  CORONARY ARTERY BYPASS GRAFTING X 3 WITH LIMA AND KIRK,AORTIC VALVE REPLACEMENT, ECC, MATTHIAS AND EPIAORTIC U/S BY DR ROWELL [facility]        Subjective:   Pt seen with Dr. Latonya Card. Yesterday SBP in 80s, received 500 LR bolus, was also placed on O2 overnight for saturations in high 80s. Now afebrile, on RA, SR @ 72. Denies nausea. No BM yet. Objective:   Pt synopsis:  3/6 POD3: afib rvr, amio bolus and gtt for 12 hours, PO amio initiated. Chest tubes removed. Delined, transfer orders in. On NC. Diureses with Lasix 20 mg IV, then redosed with 40 mg in the afternoon  3/7 POD4: Pa/lat. Suppository unsuccessful. Lasix 40 mg bid. SBP in 80s late afternoon, 500 LR bolus given. Desat overnight, 2L NC placed. 3/8 POD5: BP still soft, coreg discontinued. Lasix 40 mg once this am. Lactulose initiated. Vitals:  Blood pressure (!) 88/57, pulse 73, temperature 98.3 °F (36.8 °C), resp. rate 16, height 5' 11\" (1.803 m), weight 249 lb 12.5 oz (113.3 kg), SpO2 91 %. Temp (24hrs), Av.3 °F (36.8 °C), Min:97.9 °F (36.6 °C), Max:98.5 °F (36.9 °C)      EKG/Rhythm:  SR    Oxygen Therapy: RA      CXR:     CXR Results  (Last 48 hours)                 23 1002  XR CHEST PA LAT Final result    Impression:  Removal of Rydal-Sekou catheter and left-sided pleural drainage catheter with   right greater than left pleural effusions and bibasilar atelectasis. No   pneumothorax. Narrative:  INDICATION:   post CT pull       COMPARISON: 3/6/2023       FINDINGS:       Frontal and lateral views of the chest demonstrate removal of right IJ Rydal-Sekou   catheter and left pleural drainage tube. The lungs are adequately expanded. Moderate right and small left pleural effusions with bibasilar atelectasis. No   pneumothorax. The osseous structures are unremarkable.                     Admission Weight: Last Weight   Weight: 237 lb 14 oz (107.9 kg) Weight: 249 lb 12.5 oz (113.3 kg)     Intake / Output / Drain:  Current Shift: No intake/output data recorded. Last 24 hrs.:   Intake/Output Summary (Last 24 hours) at 3/8/2023 0916  Last data filed at 3/8/2023 0700  Gross per 24 hour   Intake 740 ml   Output 2000 ml   Net -1260 ml       EXAM:  General:  Sitting up in chair, in no distress                                                                                           Lungs:   Clear/diminished to auscultation bilaterally. Incision:  No erythema, drainage or swelling. Heart:  Regular rate and rhythm, S1, S2 normal, no murmur, click, rub or gallop. Abdomen:   Soft, non-tender. Bowel sounds normal. No masses,  No organomegaly. + flatus, - BM   Extremities:  Mild 1+ edema. PPP. Warm   Neurologic:  Gross motor and sensory apparatus intact. Equal strength, clear speech     Labs:   Recent Labs     03/08/23  0431 03/07/23  0434 03/06/23  0829   WBC 8.5   < >  --    HGB 8.2*   < >  --    HCT 25.0*   < >  --    *   < >  --       < >  --    K 3.6   < >  --    BUN 26*   < >  --    CREA 0.82   < >  --    *   < >  --    GLUCPOC  --   --  112    < > = values in this interval not displayed. Assessment:     Principal Problem:    NSTEMI (non-ST elevated myocardial infarction) ([facility] [address] (2/24/2023)    Active Problems:    S/P AVR (3/3/2023)       Plan/Recommendations/Medical Decision Making:     CAD/NSTEMI: OSH, + troponins, cath results with MVD  - Continue ASA 81mg and crestor, and zetia  - Holding BB this am  - chest tubes removed 3/6, will pull wires today    S/p AVR tissue:  - Continue ASA  - repeat echo complete     Postop afib: one episode afib rvr on 3/6. Converted to SR with amio bolus and gtt. Remained in SR since then. - cont PO amio with usual taper    Acute postoperative thrombocytopenia: Above transfusion threshold.   - cont asa  - monitor cbc  - d/c famotidine, initiated PPI for GI prophylaxis    Acute postoperative blood loss anemia: Above transfusion threshold. Received several products POD0 and received 1 unit PRBC 3/5  - Monitor cbc     Transaminitis: AST downtrending  - monitor CMP, ok to cont statin and tylenol    HTN: PTA medications Micardis (telmisartan) 20mg daily. Losartan was started inpatient preop. Inactive. Dyslipidemia: PTA Crestor 10mg and Zetia 10mg  - Crestor increased to 20mg daily by Cardiology  - Continue Zetia     Hiatal Hernia: Noted on xray on admission  - No acute interventions     Nutrition: advance as tolerated  GI proph: protonix  Bowel reg: miralax, pericolace, lactulose  DVT proph: SCDs, ambulate    Dispo: Transfer orders in. BP needs to rebound, he needs to stay off O2, and needs a BM and then hopefully home tomorrow.     Signed By: Kacey Nagel NP

## 2023-03-08 NOTE — PROCEDURES
External atrial and ventricular epicardial wire sites cleansed with alcohol. Sutures cut and removed. Traction pulled on atrial wires, wires pulled. Traction pulled on ventricular wires, wires pulled. Pt tolerated procedure well.  Bedrest for one hour

## 2023-03-08 NOTE — PROGRESS NOTES
1930  Verbal bedside report given to Doreen Langston RN oncoming nurse by Gil Sheppard. Deyanira Pike RN off-going nurse. Report included current pt status and condition, recent results, hx of present illness, heart rate and rhythm (NSR), and respiratory status. [id number]  Verbal report received by BRITTNEY Moore from [place], RN  on pt incoming from CVICU for progression of care. Report consisted of SBAR, Kardex, ED Summary and Cardiac Rhythm. Patient arrived on CVSU via ambulatory. Patient oriented to room and call bell, vital signs obtained. Opportunity for questions and clarification was provided. Assessment completed, rate and rhythm documented.

## 2023-03-08 NOTE — PROGRESS NOTES
Transitions of Care Plan  RUR: 13% - low  Clinical Update: will need additional day of recovery; therapy  Consults: Javier  Baseline: independent without DME; resides w wife in [facility] [address]  Barriers to Discharge: medical  Disposition:  Home Health: [address]  Estimated Discharge Date: 3/9/23    CM update by CT Surgery NP that patient's discharge is pushed until tomorrow. CM spoke with [address] intake Sravani Martinez) and provided update that patient will discharge tomorrow for home health to see him on Friday. Intake confirmed that he will be seen by [facility] services on Friday. Disposition Plan:  Home Health:  [address]  Transportation: Wife    Maryjane Michael, MPH  Care Manager l [address]  Available via [employer] or

## 2023-03-08 NOTE — PROGRESS NOTES
[address] visit attempted. Mr. Carty Single was asleep. Prayer for spiritual communion offered.     GERBER Parikh, RN, ACSW, LCSW   Page:  [phone]

## 2023-03-08 NOTE — DISCHARGE SUMMARY
[facility] Discharge Summary     Patient ID:  Margarita Conner  [id number]  72 y.o.  1945    Admit date: 2/28/2023    Discharge date: 3/9/2023     Admitting Physician: Dagoberto Scott MD     Referring Cardiologist:  Dr. Kelly Siu    PCP:  Benito Deras MD     Admitting Diagnoses: CAD, AS    Discharge Diagnoses: CAD, s/p CABGx3, s/p AVR tissue    Hospital Problems  Date Reviewed: 2/28/2023            Codes Class Noted POA    S/P AVR ICD-10-CM: Z95.2  ICD-9-CM: V43.3  3/3/2023 Unknown        * (Principal) NSTEMI (non-ST elevated myocardial infarction) [facility]) ICD-10-CM: I21.4  ICD-9-CM: 410.70  2/24/2023 Yes           Discharged Condition: good and improved    Disposition: home, with home health care, see patient instructions for treatment and plan    Procedures for this admission:  Procedure(s):  CORONARY ARTERY BYPASS GRAFTING X 3 WITH LIMA AND KIRK,AORTIC VALVE REPLACEMENT, ECC, MATTHIAS AND EPIAORTIC U/S BY DR ROWELL [facility]    Discharge Medications:      My Medications        START taking these medications        Instructions Each Dose to Equal Morning Noon Evening Bedtime   acetaminophen 325 mg tablet  Commonly known as: TYLENOL    Your last dose was: Your next dose is: Take 2 Tablets by mouth every four (4) hours as needed for Pain or Fever. 650 mg                 amiodarone 200 mg tablet  Commonly known as: CORDARONE    Your last dose was: Your next dose is: Take 2 Tablets by mouth every twelve (12) hours. Take 2 tablets 2 times daily for 14 days then decrease to one tablet twice daily for 14 days then stop medication   400 mg                 aspirin 81 mg chewable tablet    Your last dose was: Your next dose is: Take 1 Tablet by mouth daily. 81 mg                 furosemide 40 mg tablet  Commonly known as: LASIX    Your last dose was: Your next dose is: Take 1 Tablet by mouth daily for 7 days.    40 mg                 melatonin 3 mg tablet    Your last dose was: Your next dose is: Take 1 Tablet by mouth nightly as needed for Insomnia. 3 mg                 metoprolol succinate 25 mg XL tablet  Commonly known as: TOPROL-XL    Your last dose was: Your next dose is: Take 0.5 Tablets by mouth daily. Indications: prevention of post cardio-thoracic surgery atrial fibrillation   12.5 mg                 polyethylene glycol 17 gram packet  Commonly known as: MIRALAX    Your last dose was: Your next dose is: Take 1 Packet by mouth daily. 17 g                 potassium chloride 10 mEq tablet  Commonly known as: KLOR-CON M10    Your last dose was: Your next dose is: Take 1 Tablet by mouth daily. 10 mEq                        CONTINUE taking these medications        Instructions Each Dose to Equal Morning Noon Evening Bedtime   ezetimibe 10 mg tablet  Commonly known as: ZETIA    Your last dose was: Your next dose is: Take 10 mg by mouth. 10 mg                 rosuvastatin 10 mg tablet  Commonly known as: CRESTOR    Your last dose was: Your next dose is: Take 10 mg by mouth nightly. 10 mg                        STOP taking these medications      telmisartan 20 mg tablet  Commonly known as: MICARDIS                  Where to Get Your Medications        These medications were sent to [address] [place] AT [address] [facility], [address] [facility]      Phone: [phone]   amiodarone 200 mg tablet  furosemide 40 mg tablet  metoprolol succinate 25 mg XL tablet  potassium chloride 10 mEq tablet       Information on where to get these meds will be given to you by the nurse or doctor.     Ask your nurse or doctor about these medications  acetaminophen 325 mg tablet  aspirin 81 mg chewable tablet  melatonin 3 mg tablet  polyethylene glycol 17 gram packet       HPI:  Dudley Alanis is a 76 y.o. male who was referred for cardiac evaluation by Dr. Brent Mir. He has a PMH of HTN and dyslipidemia. He presented to [facility] on  with complaints of chest tightness and was found positive NSTEMI. He underwent cardiac catherization which showed multi vessel disease and moderate aortic stenosis. He was transferred to [facility] and cardiac surgery was consulted for surgical evaluation. Mr. Shana Marlow denies any symptoms prior to the episode of chest tightness. He denies SOB, palpitations, dizziness, edema, orthopnea. Mr. Shana Marlow lives with his wife in the independent living quarters at [facility]. He is independent of his ADLs. 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 He is COVID and flu vaccinated. He has a remote smoking history in his 25s but quit over 50 years ago, does smoke 20-30 cigars/year, denies any history of drinking or recreational drug use. He has a family history of a father who had CAD and what sounds like CHF, brother with cardiac stents, and sister who  of a stroke. Hospital Course:   Patient was taken to the operating room on 3/3/23 for aortic valve replacement, CABG x 3. He tolerated procedure well but experienced post operative coagulopathy. He received blood products in the immediate post operative period. He remained intubated overnight POD 0 to POD 1.    3/4 POD 1, intubated overnight, on epi and dobutamine, extubated 3/4 at 1033 to 4L per NC  3/5 POD 2 weaning epi and diego, remove PA catheter, insulin drip weaned  3/6 POD3: afib rvr, amio bolus and gtt for 12 hours, PO amio initiated. Chest tubes removed. Delined, transfer orders in. On NC. Diureses with Lasix 20 mg IV, then redosed with 40 mg in the afternoon  3/7 POD4: Pa/lat. Suppository unsuccessful. Lasix 40 mg bid. SBP in 80s late afternoon, 500 LR bolus given. Desat overnight, 2L NC placed. 3/8 POD5: BP still soft, coreg discontinued. Lasix 40 mg once this am. Lactulose initiated.   3/9 POD6: BP improved, low dose toptol xl this am, continues to be up in weight, oral diuresis with low dose potassium replacement at discharge for 7 day. Patient seen and evaluated on POD#6, at that time he was afebrile with stable vital signs and cardiac rhythm, he was tolerating cardiac diet and had resumption of normal bowel and bladder function. He progressed to discharge to home with home health care. Valve identification card, antibiotic administration card with dental procedures in chart for bioprosthetic card. Discharge plan:  S/p AVR tissue:  - Continue ASA    Left Ventricle: Normal left ventricular systolic function with a visually estimated EF of 55 - 60%. Left ventricle size is normal. Normal wall thickness. Normal wall motion. Aortic Valve: Bioprosthetic valve. No regurgitation. No stenosis. Antibiotic and valve card given to patient    CAD s/p NSTEMI and CABG x 3: on asa, statin, no plavix due to post operative bleeding, thrombocytopenia and acute blood loss anemia, can re-evaluate once patient blood counts recover from major surgery blood loss. Low dose toptol xl started for BB therapy     Postop afib: one episode afib rvr on 3/6. Converted to SR with amio bolus and gtt. Remained in SR since then. - prescription for amiodarone for 4 weeks therapy then to stop     Acute postoperative thrombocytopenia: Above transfusion threshold. - cont asa  - plt count 146 at discharge    Post operative non-cardiac fluid overload: with increased weight and edema in the post operative state   - lasix 40 mg po daily x 7 days   - potassium 10 meq daily to be taken with lasix only for 7 days     Acute postoperative blood loss anemia: Above transfusion threshold.  Received several products POD0 and received 1 unit PRBC 3/5  Hgb 8.2 at discharge, stable over last 4 days     Transaminitis: AST 44, ALT 22, resolving, ok to go home on statin and tylenol prn for pain     HTN: PTA medications Micardis (telmisartan) 20mg daily. Losartan was started inpatient both on hold for intolerant blood pressure     Dyslipidemia: PTA Crestor 10mg and Zetia 10mg  - Crestor increased to 20mg daily by Cardiology  - Continue Zetia     Hiatal Hernia: Noted on xray on admission  - No acute interventions      Nutrition: tolerating diet, having bowel movements  GI proph: protonix  Bowel reg: miralax, pericolace, lactulose BM 3/7  DVT proph: SCDs, ambulate     Dispo: Home today after lunch to determine if tolerating BB therapy       Discharge Vital Signs: Visit Vitals  /62 (BP 1 Location: Right upper arm, BP Patient Position: At rest)   Pulse 69   Temp 98.4 °F (36.9 °C)   Resp 18   Ht 5' 11\" (1.803 m)   Wt 249 lb 12.5 oz (113.3 kg)   SpO2 92%   BMI 34.84 kg/m²       Labs:   Recent Labs     03/09/23  0238   WBC 8.1   HGB 8.2*   HCT 25.1*   *      K 4.5   BUN 23*   CREA 0.86   *       Patient Instructions/Follow Up Care:  Discharge instructions were reviewed with the patient and family present. Questions were also answered at this time. Prescriptions and medications were reviewed. The patient has a phone call follow up appointment with the Nurse Practitioner or [de-identified] Assistant on 3/14 at 11 am and with Dr. Kiran Oliver on 4/12 at 2 pm. The patient was also instructed to follow up with his primary care physician as needed. The patient and family were encouraged to call with any questions or concerns.        Signed:  CELIO Hernandez  3/9/2023  10:00 AM

## 2023-03-08 NOTE — PROGRESS NOTES
Problem: Mobility Impaired (Adult and Pediatric)  Goal: *Acute Goals and Plan of Care (Insert Text)  Description: FUNCTIONAL STATUS PRIOR TO ADMISSION: Patient was independent and active without use of DME.     HOME SUPPORT PRIOR TO ADMISSION: The patient lived with wife but did not require assist.    Physical Therapy Goals  Initiated 3/5/2023  1. Patient will move from supine to sit and sit to supine  and scoot up and down in bed with modified independence within 5 days. 2.  Patient will perform sit to/from stand with modified independence within 5 days. 3.  Patient will ambulate 300 feet with least restrictive assistive device and supervision/set-up within 5 days. 4.  Patient will ascend/descend 2 stairs with one handrail(s) with minimal assistance/contact guard assist within 5 days. 5.  Patient will perform cardiac exercises per protocol with supervision/set-up within 5 days. 6.  Patient will verbally recall and functionally demonstrate mindful-based movements (\"move in the tube\") principles without cues within 5 days. Outcome: Progressing Towards Goal      PHYSICAL THERAPY TREATMENT  Patient: Crystal Morales (89 y.o. male)  Date: 3/8/2023  Diagnosis: NSTEMI (non-ST elevated myocardial infarction) ([facility] [address] [I21.4] NSTEMI (non-ST elevated myocardial infarction) ([address]  Procedure(s) (LRB):  CORONARY ARTERY BYPASS GRAFTING X 3 WITH LIMA AND LESVGH,AORTIC VALVE REPLACEMENT, ECC, MATTHIAS AND EPIAORTIC U/S BY DR ROWELL [facility] (N/A) 5 Days Post-Op  Precautions: Fall (move in the tube)  Chart, physical therapy assessment, plan of care and goals were reviewed. ASSESSMENT  Patient continues with skilled PT services and is progressing towards goals. Pt BP on soft side asymptomatic. Pt was able to increase gait tolerance. Pt report fatigue post gait with increase distance. Pt was able to perform cardiac exercises in seated due to fatigue. Pt is hopeful to discharge tomorrow .      Seated /52 HR 76 SpO2 94% on room air  Standing BP 93/51  Post gait seated BP 96/50 HR 79bpm  95% on room air       Patient is demonstrating understanding of mindful-based movements (\"move in the tube\") principles of keeping UEs proximal to ribcage to prevent lateral pull on the sternum during load-bearing activities with verbal cues required for compliance. Current Level of Function Impacting Discharge (mobility/balance): CGA    Other factors to consider for discharge: sternal incision          PLAN :  Patient continues to benefit from skilled intervention to address the above impairments. Continue treatment per established plan of care. to address goals. Recommendation for discharge: (in order for the patient to meet his/her long term goals)  Physical therapy at least 2 days/week in the home AND ensure assist and/or supervision for safety with mobility as needed     This discharge recommendation:  Has not yet been discussed the attending provider and/or case management    IF patient discharges home will need the following DME: none       SUBJECTIVE:   Patient stated I can keep going .     OBJECTIVE DATA SUMMARY:   Patient mobilized on continuous portable monitor/telemetry.   Critical Behavior:  Neurologic State: Alert  Orientation Level: Oriented X4  Cognition: Follows commands, Appropriate decision making, Appropriate for age attention/concentration, Appropriate safety awareness  Safety/Judgement: Awareness of environment, Insight into deficits    Functional Mobility Training:  Bed Mobility:                      Transfers:  Sit to Stand: Contact guard assistance  Stand to Sit: Stand-by assistance                               Balance:  Sitting: Intact  Standing: Impaired  Standing - Static: Good  Standing - Dynamic : Good    Ambulation/Gait Training:  Distance (ft): 259 Feet (ft)  Assistive Device: Gait belt  Ambulation - Level of Assistance: Contact guard assistance        Gait Abnormalities: Decreased step clearance        Base of Support: Widened                            Stairs:  Number of Stairs Trained: 2  Stairs - Level of Assistance: Contact guard assistance  Rail Use: Both    Cardiac diagnosis intervention:  Patient instructed and educated on mindful movement principles based on Move in The Tube concept to include maintaining bilateral elbows close to rib cage when performing any load-bearing activity such as getting in/out of bed, pushing up from a chair, opening a door, or lifting a box. Patient was given a handout with diagrams of each correct/incorrect method of performing each of the above tasks. Therapeutic Exercises:   Patient instructed on the benefits and demonstrated cardiac exercises while supine  with Stand-by assistance. Instructed and indicated understanding on how to progress reps, sets against gravity, pacing through progressive muscle strengthening standing based on surgeon clearance for more weight in prep for functional activity. Instruction on the use of household items in place of weights as needed.      CARDIAC  EXERCISE   Sets   Reps   Active Active Assist   Passive Self ROM   Comments   Shoulder flexion 1 5 [x]                                            []                                            []                                            []                                               Shoulder abduction 1      5 [x]                                            []                                            []                                            []                                               Scapular elevation 1 5 [x]                                            []                                            []                                            []                                               Scapular retraction 1 5 [x]                                            []                                            []                                            [] Trunk rotation 1 5 [x]                                            []                                            []                                            [x]                                               Trunk sidebending 1 5 [x]                                            []                                            []                                            []                                                  []                                            []                                            []                                            []                                                   Pain Rating:  Sternal incision     Activity Tolerance:   requires rest breaks    After treatment patient left in no apparent distress:   Sitting in chair and Call bell within reach    COMMUNICATION/COLLABORATION:   The patients plan of care was discussed with: Registered nurse.      Nohemi Bridges PTA   Time Calculation: 29 mins

## 2023-03-08 NOTE — PROGRESS NOTES
Problem: Self Care Deficits Care Plan (Adult)  Goal: *Acute Goals and Plan of Care (Insert Text)  Description: FUNCTIONAL STATUS PRIOR TO ADMISSION: Patient was independent and active without use of DME. Patient was independent for basic and instrumental ADLs. HOME SUPPORT: The patient lived with wife but did not require assist.    Occupational Therapy Goals  Initiated 3/5/2023  1. Patient will perform ADLs standing 5 mins without fatigue or LOB with supervision/set-up within 5 day(s). 2.  Patient will perform lower body ADLs with supervision/set-up within 5 day(s). 3.  Patient will perform gathering ADL items high and low 2/2 with supervision/set-up within 5 day(s). 4.  Patient will perform toilet transfers with supervision/set-up within 5 day(s). 5.  Patient will perform all aspects of toileting with supervision/set-up within 5 day(s). 6.  Patient will participate in cardiac/sternal upper extremity therapeutic exercise/activities to increase independence with ADLs with supervision/set-up for 5 minutes within 5 day(s). Outcome: Progressing Towards Goal   OCCUPATIONAL THERAPY TREATMENT  Patient: Dudley Alanis (49 y.o. male)  Date: 3/8/2023  Diagnosis: NSTEMI (non-ST elevated myocardial infarction) ([facility] [address] [I21.4] NSTEMI (non-ST elevated myocardial infarction) ([facility] [address]  Procedure(s) (LRB):  CORONARY ARTERY BYPASS GRAFTING X 3 WITH LIMA AND LESVGH,AORTIC VALVE REPLACEMENT, ECC, MATTHIAS AND EPIAORTIC U/S BY DR ROWELL [facility] (N/A) 5 Days Post-Op  Precautions: Fall (move in the tube)  Chart, occupational therapy assessment, plan of care, and goals were reviewed. ASSESSMENT  Patient continues with skilled OT services and is progressing towards goals. Patient tolerated increased activity on this date, able to engage in UB and LB dressing practice with AE. Patient with excellent carryover and engaged throughout session.  Overall completing dressing at [facility] level, patient reports he owns reacher and plans purchase sock aid (declined offer for therapist to provide full hip kit). Patient would benefit from cardiac shower prior to DC pending transfer to step down unit. Continue to recommend [address] at [place]. Patient is verbalizing and is demonstrating understanding of mindful-based movements (\"move in the tube\") principles of keeping UEs proximal to ribcage to prevent lateral pull on the sternum during load-bearing activities with verbal cues required for compliance. Current Level of Function Impacting Discharge (ADLs): SPV - min A     Other factors to consider for discharge: CABG, AVR         PLAN :  Patient continues to benefit from skilled intervention to address the above impairments. Continue treatment per established plan of care to address goals. Recommend with staff: up to chair daily    Recommend next OT session: sternal hygiene vs cardiac shower pending transfer to [facility]    Recommendation for discharge: (in order for the patient to meet his/her long term goals)  Occupational therapy at least 2 days/week in the home     This discharge recommendation:  Has been made in collaboration with the attending provider and/or case management    IF patient discharges home will need the following DME: sock aid, reacher , shower chair       SUBJECTIVE:   Patient stated I will get it [sock aid] on [address].     OBJECTIVE DATA SUMMARY:   Cognitive/Behavioral Status:  Neurologic State: Alert  Orientation Level: Oriented X4  Cognition: Follows commands; Appropriate decision making; Appropriate for age attention/concentration; Appropriate safety awareness             Functional Mobility and Transfers for ADLs:  Bed Mobility:  Received in chair.      Transfers:  Sit to Stand: Contact guard assistance          Balance:  Sitting: Intact  Standing: Impaired  Standing - Static: Good  Standing - Dynamic : Good    ADL Intervention:          Upper Body Dressing Assistance  Pullover Shirt: Supervision (simluation with hospital gown)  Front Opened Shirt: Supervision (simluation with hospital gown)  Cues: Verbal cues provided (cues for initial education)    Lower Body Dressing Assistance  Underpants: Supervision  Adaptive Equipment Used: Reacher              Patient instructed and educated on mindful movement principles based on Move in The Tube concept to include maintaining bilateral elbows close to rib cage when performing any load-bearing activity such as getting in/out of bed, pushing up from a chair, opening a door, or lifting a box. Patient was given a handout with diagrams of each correct/incorrect method of performing each of the above tasks. Patient instructed on the ability to utilize upper extremities outside the tube when doing any non-load bearing activity such as washing hair/body, brushing teeth, retrieving clothing items, or scratching your back. Patient encouraged to also perform upper extremity exercises \"outside of the tube\" to prevent scar tissue formation around sternal incision site. Patient instructed in detail about activities to heed with caution, allowing pain to be the guide. These activities include but are not limited to: mowing the lawn, riding a bike, walking a dog, lifting a child, workshop hobbies, golfing, sexual activity, vacuuming, fishing, scrubbing the floors, and moving furniture. Patient was given the [address] in the [place] handout to describe each of these activities in detail. Patient instructed no asymmetrical reaching over head to ensure B UEs when shoulders >90* i.e. reaching in cabinets and dressing. Instruction on upper body dressing techniques of over head, then arms through to decrease pain and unilateral shoulder flexion >90*. Instruction on the benefits of utilizing B UEs during functional tasks i.e. opening the fridge, stepping into the tub. Instruction if continued pain at home with shoulder IR for BM hygiene can use wet wipes and toilet tongs PRN. Avoid valsalva maneuvers.   May have to adjust home setup to increase ease with items closer to waist height to prevent deep bending and the automatic  of asymmetrical UE WB/pushing for stabilization during bending. Benefit to don clothing tailor sitting and don all clothing while sitting prior to standing. Patient demonstrated lower body dressing with Supervision. Instruction and indicated understanding on the benefits of loose clothing throughout to accommodate for post surgical swelling, decreased ROM and increased pain. Instruction and indicated understanding the technique of pull over shirt versus front open clothing. Increase activity tolerance for home, work, and sexual intercourse by pacing self with increasing the arm exercises, sitting duration, frequency OOB, walking, standing, and ADLs. Instructed and indicated understanding of s/s of too much activity, how to respond to s/s safely. Pain:  Pt did not report pain    Activity Tolerance:   Good    After treatment patient left in no apparent distress:   Sitting in chair and Call bell within reach    COMMUNICATION/COLLABORATION:   The patients plan of care was discussed with: Physical therapist, Occupational therapist, and Registered nurse.      Lindy Gordon OT  Time Calculation: 24 mins

## 2023-03-09 VITALS
BODY MASS INDEX: 34.97 KG/M2 | RESPIRATION RATE: 18 BRPM | OXYGEN SATURATION: 92 % | WEIGHT: 249.78 LBS | DIASTOLIC BLOOD PRESSURE: 62 MMHG | SYSTOLIC BLOOD PRESSURE: 123 MMHG | TEMPERATURE: 98.4 F | HEIGHT: 71 IN | HEART RATE: 69 BPM

## 2023-03-09 LAB
ANION GAP SERPL CALC-SCNC: 4 MMOL/L (ref 5–15)
BUN SERPL-MCNC: 23 MG/DL (ref 6–20)
BUN/CREAT SERPL: 27 (ref 12–20)
CALCIUM SERPL-MCNC: 8.6 MG/DL (ref 8.5–10.1)
CHLORIDE SERPL-SCNC: 101 MMOL/L (ref 97–108)
CO2 SERPL-SCNC: 31 MMOL/L (ref 21–32)
CREAT SERPL-MCNC: 0.86 MG/DL (ref 0.7–1.3)
ERYTHROCYTE [DISTWIDTH] IN BLOOD BY AUTOMATED COUNT: 14 % (ref 11.5–14.5)
GLUCOSE SERPL-MCNC: 105 MG/DL (ref 65–100)
HCT VFR BLD AUTO: 25.1 % (ref 36.6–50.3)
HGB BLD-MCNC: 8.2 G/DL (ref 12.1–17)
MAGNESIUM SERPL-MCNC: 2.4 MG/DL (ref 1.6–2.4)
MCH RBC QN AUTO: 29.3 PG (ref 26–34)
MCHC RBC AUTO-ENTMCNC: 32.7 G/DL (ref 30–36.5)
MCV RBC AUTO: 89.6 FL (ref 80–99)
NRBC # BLD: 0.04 K/UL (ref 0–0.01)
NRBC BLD-RTO: 0.5 PER 100 WBC
PLATELET # BLD AUTO: 146 K/UL (ref 150–400)
PMV BLD AUTO: 10.6 FL (ref 8.9–12.9)
POTASSIUM SERPL-SCNC: 4.5 MMOL/L (ref 3.5–5.1)
RBC # BLD AUTO: 2.8 M/UL (ref 4.1–5.7)
SODIUM SERPL-SCNC: 136 MMOL/L (ref 136–145)
WBC # BLD AUTO: 8.1 K/UL (ref 4.1–11.1)

## 2023-03-09 PROCEDURE — 80048 BASIC METABOLIC PNL TOTAL CA: CPT

## 2023-03-09 PROCEDURE — 36415 COLL VENOUS BLD VENIPUNCTURE: CPT

## 2023-03-09 PROCEDURE — 74011250637 HC RX REV CODE- 250/637

## 2023-03-09 PROCEDURE — 74011250637 HC RX REV CODE- 250/637: Performed by: PHYSICIAN ASSISTANT

## 2023-03-09 PROCEDURE — 97535 SELF CARE MNGMENT TRAINING: CPT

## 2023-03-09 PROCEDURE — 74011250637 HC RX REV CODE- 250/637: Performed by: NURSE PRACTITIONER

## 2023-03-09 PROCEDURE — 83735 ASSAY OF MAGNESIUM: CPT

## 2023-03-09 PROCEDURE — 74011000250 HC RX REV CODE- 250

## 2023-03-09 PROCEDURE — 85027 COMPLETE CBC AUTOMATED: CPT

## 2023-03-09 RX ORDER — METOPROLOL SUCCINATE 25 MG/1
12.5 TABLET, EXTENDED RELEASE ORAL DAILY
Status: DISCONTINUED | OUTPATIENT
Start: 2023-03-09 | End: 2023-03-09

## 2023-03-09 RX ORDER — POLYETHYLENE GLYCOL 3350 17 G/17G
17 POWDER, FOR SOLUTION ORAL DAILY
Qty: 30 PACKET | Refills: 0 | Status: SHIPPED
Start: 2023-03-09

## 2023-03-09 RX ORDER — LANOLIN ALCOHOL/MO/W.PET/CERES
3 CREAM (GRAM) TOPICAL
Qty: 100 TABLET | Refills: 0 | Status: SHIPPED
Start: 2023-03-09

## 2023-03-09 RX ORDER — POTASSIUM CHLORIDE 750 MG/1
10 TABLET, EXTENDED RELEASE ORAL DAILY
Qty: 7 TABLET | Refills: 0 | Status: SHIPPED | OUTPATIENT
Start: 2023-03-09

## 2023-03-09 RX ORDER — GUAIFENESIN 100 MG/5ML
81 LIQUID (ML) ORAL DAILY
Qty: 100 TABLET | Refills: 0 | Status: SHIPPED
Start: 2023-03-09

## 2023-03-09 RX ORDER — FUROSEMIDE 40 MG/1
40 TABLET ORAL DAILY
Qty: 7 TABLET | Refills: 0 | Status: SHIPPED | OUTPATIENT
Start: 2023-03-09 | End: 2023-03-16

## 2023-03-09 RX ORDER — ACETAMINOPHEN 325 MG/1
650 TABLET ORAL
Qty: 100 TABLET | Refills: 0 | Status: SHIPPED
Start: 2023-03-09

## 2023-03-09 RX ORDER — METOPROLOL SUCCINATE 25 MG/1
12.5 TABLET, EXTENDED RELEASE ORAL DAILY
Status: DISCONTINUED | OUTPATIENT
Start: 2023-03-09 | End: 2023-03-09 | Stop reason: HOSPADM

## 2023-03-09 RX ORDER — METOPROLOL SUCCINATE 25 MG/1
12.5 TABLET, EXTENDED RELEASE ORAL DAILY
Qty: 15 TABLET | Refills: 3 | Status: SHIPPED | OUTPATIENT
Start: 2023-03-09

## 2023-03-09 RX ORDER — AMIODARONE HYDROCHLORIDE 200 MG/1
400 TABLET ORAL EVERY 12 HOURS
Qty: 84 TABLET | Refills: 0 | Status: SHIPPED | OUTPATIENT
Start: 2023-03-09

## 2023-03-09 RX ADMIN — EZETIMIBE 10 MG: 10 TABLET ORAL at 09:38

## 2023-03-09 RX ADMIN — Medication 400 MG: at 09:38

## 2023-03-09 RX ADMIN — SODIUM CHLORIDE, PRESERVATIVE FREE 10 ML: 5 INJECTION INTRAVENOUS at 06:08

## 2023-03-09 RX ADMIN — METOPROLOL SUCCINATE 12.5 MG: 25 TABLET, EXTENDED RELEASE ORAL at 09:38

## 2023-03-09 RX ADMIN — AMIODARONE HYDROCHLORIDE 400 MG: 200 TABLET ORAL at 09:38

## 2023-03-09 RX ADMIN — FUROSEMIDE 40 MG: 40 TABLET ORAL at 09:38

## 2023-03-09 RX ADMIN — SENNOSIDES AND DOCUSATE SODIUM 1 TABLET: 50; 8.6 TABLET ORAL at 09:39

## 2023-03-09 RX ADMIN — CHLORHEXIDINE GLUCONATE 10 ML: 1.2 RINSE ORAL at 09:00

## 2023-03-09 RX ADMIN — ASPIRIN 81 MG CHEWABLE TABLET 81 MG: 81 TABLET CHEWABLE at 09:38

## 2023-03-09 RX ADMIN — PANTOPRAZOLE SODIUM 40 MG: 40 TABLET, DELAYED RELEASE ORAL at 06:08

## 2023-03-09 NOTE — DISCHARGE INSTRUCTIONS
Cardiac Surgery Specialist    [address] [place], [place] [address] [place] [address]                       [place] [address]  Office- [phone]  Fax- [phone]       Office- [phone]  Fax- [phone]  _____________________________________________________________  BAYLEE Yanez Dr., NP  Dr. Rupert Sanchez, NP     Carola Bunker, PA-C Dr. Rolinda Mann, PA-C C. Katherin Mortimer, WAQAR   _____________________________________________________________    Name:Marquise Smith     Surgery & Date: Procedure(s):  CORONARY ARTERY BYPASS GRAFTING X 3 WITH LIMA AND KIRK,AORTIC VALVE REPLACEMENT, ECC, MATTHIAS AND EPIAORTIC U/S BY DR ROWELL [facility]    Discharge Date: 03/09/23      MEDICATIONS:  Please refer to your After Visit Summary for your medication list. If you do not have a prescription for a new medication, you may purchase the medication over the counter. DO NOT TAKE ANY MEDICATIONS THAT ARE NOT ON THIS LIST    INSTRUCTIONS:  NO SMOKING OR TOBACCO PRODUCTS  Follow all the instructions in your discharge book  You may shower. Wash all incisions twice daily with mild soap and water. No lotions, ointments or powder. Call the office immediately for any redness, swelling, or drainage from your incision. Take your temperature daily and call for a temperature of 101 degrees or higher or for any symptoms that make you think you have and infection. Weigh yourself each morning. Call if you gain more than 5 pounds in 48 hours. Use the incentive spirometer 6-8 times a day-10 breaths each time. Use a pillow or your bear to splint your breastbone when coughing or sneezing. If you feel your breast bone clicking or popping, notify the office immediately.    Walk several hundred feet several times daily. DIET  Eat an American Heart Association diet. If you are having trouble with your appetite, eat what you can. Try eating small, frequent meals throughout the day. ACTIVITY  NO DRIVING--you will be evaluated to drive at your follow up visit. Increase your activity by walking several times a day. Stay out of bed most of the day. When sitting, keep your legs elevated. You may ride in a car, but you must get out every hour and walk around. If you ride in a car with an airbag that can not be switched off, put the seat ALL the way back or ride in the back seat. Any load bearing activity can be performed as long as it can be performed \"in the tube\". You can reach \"out of the tube\" when performing activities that don't require heavy lifting. Let pain be your guide, your pain level will keep you from doing anything extreme. FOLLOW UP  Your first follow up appointment will be on  3/14 at 11 am for a phone visit. Our office is located in [address] [place] [address]. Your second follow up appointment will be in four weeks, on 4/12 at 2 pm with Dr. Rachel Black. Please call our office at [phone] if you are unable to make either one of these appointments. You will be receiving a call before your 5 day appointment to begin cardiac rehab. They are programs located at [address] [place] [address], [address] [place] [address] and [facility].  The contact information is located in your Cardiac Surgery booklet. Please call if you have not been contacted 2-3 weeks after discharge from the hospital.  We will make an appointment with your cardiologist at your last appointment. Consult you primary care physician regarding your influenza &   pneumovax vaccines. 5.   Please bring all medications with you to your appointment.     Signature:___________________________________________________       Ian Dandy Dressing Instructions:    Prineo is a lightweight mesh thats applied over your incision, then coated with a skin adhesive to create a strong, flexible seal that protects against water and bacteria. Your healthcare team chose to use Dermabond Prineo system on your sternal incision. Please share the instruction sheet in your discharge packet with your home health nurse. They will assist with dressing removal 10-14 days after surgery. Prineo stays on for 10-14 days. If you notice the edge of the dressing peeling up, trim the edge but do not remove the dressing. Dont scratch, rub or pick at it. Do not apply topical lotions, ointments, or liquids  You may shower and let soap and water water run on the dressing, but do not scrub it. Be sure to lightly pat it dry when you exit the shower.

## 2023-03-09 NOTE — PROGRESS NOTES
0730: Bedside and Verbal shift change report given to [address] (oncoming nurse) by Kita Chery (offgoing nurse). Report included the following information SBAR, Kardex, Intake/Output, MAR, Recent Results, Med Rec Status, and Cardiac Rhythm NSR .

## 2023-03-09 NOTE — PROGRESS NOTES
Cardiac Surgery Care Coordinator-  Met with Sita Quigley and his wife. Reviewed plan of care and discharge instructions. Reinforced move in the tube, sternal precautions and continued use of the incentive spirometer. Sita Quigley is able to pull 1500cc with good effort. Reviewed the importance of daily temp and weight monitoring, discussed incisional care and reviewed signs and symptoms of infection. Red reminder bracelet on gabriela bear, it is too tight for his wrist, reviewed purpose of the bracelet and when to call the MD. Using the teach back method reviewed new medications to include the name, purpose and possible side effects of acetaminophen, amiodarone, aspirin, furosemide, melatonin, metoprolol, mirlax and potassium. Provided them with his valve ID card and dental prophylaxis card, discussed the purpose of both. Reminded pt of appts and encouraged participation in the Cardiac Wellness and rehab program after discharge. Encouraged Sita Quigley to verbalize and emotional support given. Sita Quigley is without questions or concerns at this time.  Will follow up with a phone call after discharge

## 2023-03-09 NOTE — PROGRESS NOTES
Problem: Self Care Deficits Care Plan (Adult)  Goal: *Acute Goals and Plan of Care (Insert Text)  Description: FUNCTIONAL STATUS PRIOR TO ADMISSION: Patient was independent and active without use of DME. Patient was independent for basic and instrumental ADLs. HOME SUPPORT: The patient lived with wife but did not require assist.    Occupational Therapy Goals  Initiated 3/5/2023  1. Patient will perform ADLs standing 5 mins without fatigue or LOB with supervision/set-up within 5 day(s). 2.  Patient will perform lower body ADLs with supervision/set-up within 5 day(s). 3.  Patient will perform gathering ADL items high and low 2/2 with supervision/set-up within 5 day(s). 4.  Patient will perform toilet transfers with supervision/set-up within 5 day(s). 5.  Patient will perform all aspects of toileting with supervision/set-up within 5 day(s). 6.  Patient will participate in cardiac/sternal upper extremity therapeutic exercise/activities to increase independence with ADLs with supervision/set-up for 5 minutes within 5 day(s). Outcome: Progressing Towards Goal   OCCUPATIONAL THERAPY TREATMENT  Patient: Freya Martin (60 y.o. male)  Date: 3/9/2023  Diagnosis: NSTEMI (non-ST elevated myocardial infarction) ([facility] [address] [I21.4] NSTEMI (non-ST elevated myocardial infarction) ([facility] [address]  Procedure(s) (LRB):  CORONARY ARTERY BYPASS GRAFTING X 3 WITH LIMA AND L[place]VGH,AORTIC VALVE REPLACEMENT, ECC, MATTHIAS AND EPIAORTIC U/S BY DR ROWELL [facility] (N/A) 6 Days Post-Op  Precautions: Fall (move in the tube)  Chart, occupational therapy assessment, plan of care, and goals were reviewed. ASSESSMENT  Patient continues with skilled OT services and is progressing towards goals. Patient was received sitting in chair, AO x4 and was calm and cooperative with therapy. Patient demonstrated understanding of \"move in the tube\" and sternal precautions and was noted to benefit from teach-back method.  Patient demonstrated functional mobility with supervision due to general deconditioning and weakness, and completed stand to sit transfer to shower bench with supervision. Patient completed shower with standby assistance, verbal and tactile cues to follow precautions. Patient completed LB dressing with supervision and UB dressing with Min A to pull down back of shirt. Patient ended session sitting in chair with spouse present receptive to all provided education, able to provide required level of assist at home. HHOT recommended at discharge. Patient is verbalizing understanding of mindful-based movements (\"move in the tube\") principles of keeping UEs proximal to ribcage to prevent lateral pull on the sternum during load-bearing activities with visual, verbal, and tactile cues required for compliance. Current Level of Function Impacting Discharge (ADLs): Supervision to Min A. Other factors to consider for discharge: CABG, \"move in the tube\". PLAN :  Patient continues to benefit from skilled intervention to address the above impairments. Continue treatment per established plan of care to address goals. Recommendation for discharge: (in order for the patient to meet his/her long term goals)  Occupational therapy at least 2 days/week in the home     This discharge recommendation:  Has been made in collaboration with the attending provider and/or case management    IF patient discharges home will need the following DME: AE: long handled bathing, AE: long handled dressing, and shower chair (patient plans to purchase through [employer]). SUBJECTIVE:   Patient stated That felt great.  Re: shower.      OBJECTIVE DATA SUMMARY:   Cognitive/Behavioral Status:  Neurologic State: Alert  Orientation Level: Oriented X4  Cognition: Appropriate for age attention/concentration             Functional Mobility and Transfers for ADLs:    Transfers:  Sit to Stand: Supervision  Functional Transfers  Shower Transfer: Contact guard assistance Balance:  Sitting: Intact  Standing: Intact    ADL Intervention:       Grooming  Washing Face: Independent  Washing Hands: Independent    Upper Body Bathing  Bathing Assistance: Stand-by assistance  Position Performed: Seated in chair (Shower bench)  Cues: Verbal cues provided; Tactile cues provided  Adaptive Equipment: Shower chair    Type of Bath: Chlorhexidine (CHG)    Lower Body Bathing  Bathing Assistance: Stand-by assistance  Perineal  : Independent  Position Performed: Seated in chair (Shower chair)  Cues: Verbal cues provided; Tactile cues provided  Lower Body : Stand-by assistance  Position Performed: Seated in chair (shower chair)    Upper Body Dressing Assistance  Dressing Assistance: Minimum assistance  Pullover Shirt: Supervision  Front Opened Shirt: Minimum assistance (Manage down back)  Cues: Verbal cues provided    Lower Body Dressing Assistance  Underpants: Supervision  Pants With Elastic Waist: Supervision  Position Performed: Seated in chair              Patient instructed and educated on mindful movement principles based on Move in The Tube concept to include maintaining bilateral elbows close to rib cage when performing any load-bearing activity such as getting in/out of bed, pushing up from a chair, opening a door, or lifting a box. Patient was given a handout with diagrams of each correct/incorrect method of performing each of the above tasks. Patient instructed on the ability to utilize upper extremities outside the tube when doing any non-load bearing activity such as washing hair/body, brushing teeth, retrieving clothing items, or scratching your back. Patient encouraged to also perform upper extremity exercises \"outside of the tube\" to prevent scar tissue formation around sternal incision site. Patient instructed in detail about activities to heed with caution, allowing pain to be the guide.  These activities include but are not limited to: mowing the lawn, riding a bike, walking a dog, lifting a child, workshop hobbies, golfing, sexual activity, vacuuming, fishing, scrubbing the floors, and moving furniture. Patient was given the [address] [place] handout to describe each of these activities in detail. Patient instructed no asymmetrical reaching over head to ensure B UEs when shoulders >90* i.e. reaching in cabinets and dressing. Instruction on upper body dressing techniques of over head, then arms through to decrease pain and unilateral shoulder flexion >90*. Instruction on the benefits of utilizing B UEs during functional tasks i.e. opening the fridge, stepping into the tub. Instruction if continued pain at home with shoulder IR for BM hygiene can use wet wipes and toilet tongs PRN. Avoid valsalva maneuvers. May have to adjust home setup to increase ease with items closer to waist height to prevent deep bending and the automatic  of asymmetrical UE WB/pushing for stabilization during bending. Benefit to don clothing tailor sitting and don all clothing while sitting prior to standing. Patient demonstrated lower body dressing with Supervision. Instruction and indicated understanding on the benefits of loose clothing throughout to accommodate for post surgical swelling, decreased ROM and increased pain. Instruction and indicated understanding the technique of pull over shirt versus front open clothing. Increase activity tolerance for home, work, and sexual intercourse by pacing self with increasing the arm exercises, sitting duration, frequency OOB, walking, standing, and ADLs. Instructed and indicated understanding of s/s of too much activity, how to respond to s/s safely. Pain:  Patient did not endorse pain.     Activity Tolerance:   Good and requires rest breaks    After treatment patient left in no apparent distress:   Sitting in chair, Call bell within reach, and Caregiver / family present    COMMUNICATION/COLLABORATION:   The patients plan of care was discussed with: Physical therapist, Occupational therapist, Registered nurse, and Case management.      Lindy Gordon, OT  Time Calculation: 46 mins

## 2023-03-09 NOTE — PROGRESS NOTES
Transitions of Care Plan  RUR: 12% - low  Clinical Update: stable  Consults: Javier  Baseline: independent without DME; [facility]  Barriers to Discharge: none  Disposition:  Home Health: [address]  Estimated Discharge Date: 3/9/23    CM spoke with CT Surgery NP. Patient medically stable for hospital discharge today. CM spoke with Will Borjas at [address] - she is unaware of patient's schedule for [facility] services. Meir Garcia that CM spoke with Regine Lugo yesterday and confirmed discharge for today so that patient can be seen at home tomorrow.  with intake to return CM phone call. Medicare pt has received, reviewed, and signed 2nd IM letter informing them of their right to appeal the discharge. Signed copied has been placed on pt bedside chart. Disposition:  Home Health:  [address]  Transportation: Wife    Timur Anguiano, MPH  Care Manager [facility]  Available via [employer] or  [employer]

## 2023-03-09 NOTE — PROGRESS NOTES
[facility] ICU Progress Note    Admit Date: 2023  POD:  6 Days Post-Op    Procedure:  Procedure(s):  CORONARY ARTERY BYPASS GRAFTING X 3 WITH LIMA AND KIRK,AORTIC VALVE REPLACEMENT, ECC, MATTHIAS AND EPIAORTIC U/S BY DR ROWELL [facility]        Subjective:   Pt seen with Dr. Coleen Valdovinos, Bp running in low 100's, coreg held for last 24 hours, BM after lactulose administration, no oxygen needed overnight, ready to go home     Objective:   Pt synopsis:  3/4 POD 1, intubated overnight, on epi and dobutamine, extubated 3/4 at 1033 to 4L per NC  3/5 POD 2 weaning epi and diego, remove PA catheter, insulin drip weaned  3/6 POD3: afib rvr, amio bolus and gtt for 12 hours, PO amio initiated. Chest tubes removed. Delined, transfer orders in. On NC. Diureses with Lasix 20 mg IV, then redosed with 40 mg in the afternoon  3/7 POD4: Pa/lat. Suppository unsuccessful. Lasix 40 mg bid. SBP in 80s late afternoon, 500 LR bolus given. Desat overnight, 2L NC placed. 3/8 POD5: BP still soft, coreg discontinued. Lasix 40 mg once this am. Lactulose initiated. 3/9 POD6: BP improved, will try low dose toptol xl this am, continues to be up in weight    Vitals:  Blood pressure 123/62, pulse 69, temperature 98.4 °F (36.9 °C), resp. rate 18, height 5' 11\" (1.803 m), weight 249 lb 12.5 oz (113.3 kg), SpO2 92 %. Temp (24hrs), Av.4 °F (36.9 °C), Min:98.1 °F (36.7 °C), Max:99.3 °F (37.4 °C)      EKG/Rhythm:  SR    Oxygen Therapy: RA      CXR: no imaging today, PA/LAT 3/7    CXR Results  (Last 48 hours)                 23 1002  XR CHEST PA LAT Final result    Impression:  Removal of [place] catheter and left-sided pleural drainage catheter with   right greater than left pleural effusions and bibasilar atelectasis. No   pneumothorax. Narrative:  INDICATION:   post CT pull       COMPARISON: 3/6/2023       FINDINGS:       Frontal and lateral views of the chest demonstrate removal of right IJ [place]   catheter and left pleural drainage tube.  The lungs are adequately expanded. Moderate right and small left pleural effusions with bibasilar atelectasis. No   pneumothorax. The osseous structures are unremarkable. Admission Weight: Last Weight   Weight: 237 lb 14 oz (107.9 kg) Weight: 249 lb 12.5 oz (113.3 kg)     Intake / Output / Drain:/  Current Shift: No intake/output data recorded. Last 24 hrs.:   Intake/Output Summary (Last 24 hours) at 3/9/2023 5722  Last data filed at 3/9/2023 0356  Gross per 24 hour   Intake 480 ml   Output 755 ml   Net -275 ml       EXAM:  General:  Sitting up in chair, iconversational, asking if we called wife yet                                                                                    Lungs:   CTA bilaterally, diminished at bases, good effort   Incision:  No erythema, drainage or swelling. Sternum stable   Heart:  Regular rate and rhythm, S1, S2 normal, no murmur, click, rub or gallop. Abdomen:   Soft, non-tender. Bowel sounds normal. + BM 3/8   Extremities:  Mild 1+ edema. Warm and well perfused   Neurologic:  Alert and oriented to self, place, time and situation, gait smooth, minimal use of assistive devices      Labs:   Recent Labs     03/09/23  0238 03/07/23  0434 03/06/23  0829   WBC 8.1   < >  --    HGB 8.2*   < >  --    HCT 25.1*   < >  --    *   < >  --       < >  --    K 4.5   < >  --    BUN 23*   < >  --    CREA 0.86   < >  --    *   < >  --    GLUCPOC  --   --  112    < > = values in this interval not displayed.         Assessment:     Principal Problem:    NSTEMI (non-ST elevated myocardial infarction) ([facility] [address] (2/24/2023)    Active Problems:    S/P AVR (3/3/2023)       Plan/Recommendations/Medical Decision Making:     CAD/NSTEMI:   - Continue ASA 81mg and crestor, and zetia  - tubes and wires out, plan for low dose BB today and d/c home later    S/p AVR tissue:  - Continue ASA    Left Ventricle: Normal left ventricular systolic function with a visually estimated EF of 55 - 60%. Left ventricle size is normal. Normal wall thickness. Normal wall motion. Aortic Valve: Bioprosthetic valve. No regurgitation. No stenosis. Postop afib: one episode afib rvr on 3/6. Converted to SR with amio bolus and gtt. Remained in SR since then. - cont PO amio with usual taper    Acute postoperative thrombocytopenia: Above transfusion threshold. - cont asa  - plt count 146    Acute postoperative blood loss anemia: Above transfusion threshold. Received several products POD0 and received 1 unit PRBC 3/5  Hgb 8.2, stable over last 4 days    Transaminitis: AST 44, ALT 22    HTN: PTA medications Micardis (telmisartan) 20mg daily. Losartan was started inpatient preop. Inactive.     Dyslipidemia: PTA Crestor 10mg and Zetia 10mg  - Crestor increased to 20mg daily by Cardiology  - Continue Zetia     Hiatal Hernia: Noted on xray on admission  - No acute interventions   - continue PPI    Nutrition: tolerating diet, having bowel movements  GI proph: protonix  Bowel reg: miralax, pericolace, lactulose BM 3/7  DVT proph: SCDs, ambulate    Dispo: Home today after lunch to determine if tolerating BB therapy    Signed By: CELIO Whalen

## 2023-03-09 NOTE — PROGRESS NOTES
1130: I have reviewed discharge instructions with the patient. The patient verbalized understanding. Current Discharge Medication List        START taking these medications    Details   acetaminophen (TYLENOL) 325 mg tablet Take 2 Tablets by mouth every four (4) hours as needed for Pain or Fever. Qty: 100 Tablet, Refills: 0  Start date: 3/9/2023      amiodarone (CORDARONE) 200 mg tablet Take 2 Tablets by mouth every twelve (12) hours. Take 2 tablets 2 times daily for 14 days then decrease to one tablet twice daily for 14 days then stop medication  Qty: 84 Tablet, Refills: 0  Start date: 3/9/2023      aspirin 81 mg chewable tablet Take 1 Tablet by mouth daily. Qty: 100 Tablet, Refills: 0  Start date: 3/9/2023      furosemide (LASIX) 40 mg tablet Take 1 Tablet by mouth daily for 7 days. Qty: 7 Tablet, Refills: 0  Start date: 3/9/2023, End date: 3/16/2023      melatonin 3 mg tablet Take 1 Tablet by mouth nightly as needed for Insomnia. Qty: 100 Tablet, Refills: 0  Start date: 3/9/2023      metoprolol succinate (TOPROL-XL) 25 mg XL tablet Take 0.5 Tablets by mouth daily. Indications: prevention of post cardio-thoracic surgery atrial fibrillation  Qty: 15 Tablet, Refills: 3  Start date: 3/9/2023      polyethylene glycol (MIRALAX) 17 gram packet Take 1 Packet by mouth daily. Qty: 30 Packet, Refills: 0  Start date: 3/9/2023      potassium chloride (KLOR-CON M10) 10 mEq tablet Take 1 Tablet by mouth daily. Qty: 7 Tablet, Refills: 0  Start date: 3/9/2023           CONTINUE these medications which have NOT CHANGED    Details   rosuvastatin (CRESTOR) 10 mg tablet Take 10 mg by mouth nightly.      ezetimibe (ZETIA) 10 mg tablet Take 10 mg by mouth.            STOP taking these medications       telmisartan (MICARDIS) 20 mg tablet Comments:   Reason for Stopping:

## 2023-03-10 ENCOUNTER — HOME CARE VISIT (OUTPATIENT)
Dept: SCHEDULING | Facility: HOME HEALTH | Age: 78
End: 2023-03-10
Payer: MEDICARE

## 2023-03-10 VITALS
HEART RATE: 72 BPM | SYSTOLIC BLOOD PRESSURE: 122 MMHG | OXYGEN SATURATION: 94 % | DIASTOLIC BLOOD PRESSURE: 78 MMHG | WEIGHT: 252 LBS | RESPIRATION RATE: 18 BRPM | BODY MASS INDEX: 35.15 KG/M2 | TEMPERATURE: 97.7 F

## 2023-03-10 PROCEDURE — G0299 HHS/HOSPICE OF RN EA 15 MIN: HCPCS

## 2023-03-13 ENCOUNTER — HOME CARE VISIT (OUTPATIENT)
Dept: SCHEDULING | Facility: HOME HEALTH | Age: 78
End: 2023-03-13
Payer: MEDICARE

## 2023-03-13 ENCOUNTER — TELEPHONE (OUTPATIENT)
Dept: CASE MANAGEMENT | Age: 78
End: 2023-03-13

## 2023-03-13 VITALS
TEMPERATURE: 97.5 F | RESPIRATION RATE: 16 BRPM | SYSTOLIC BLOOD PRESSURE: 124 MMHG | OXYGEN SATURATION: 95 % | HEART RATE: 83 BPM | DIASTOLIC BLOOD PRESSURE: 68 MMHG | WEIGHT: 239.5 LBS | BODY MASS INDEX: 33.4 KG/M2

## 2023-03-13 VITALS
HEART RATE: 81 BPM | OXYGEN SATURATION: 91 % | DIASTOLIC BLOOD PRESSURE: 76 MMHG | RESPIRATION RATE: 22 BRPM | SYSTOLIC BLOOD PRESSURE: 118 MMHG | TEMPERATURE: 98.1 F

## 2023-03-13 PROCEDURE — G0300 HHS/HOSPICE OF LPN EA 15 MIN: HCPCS

## 2023-03-13 PROCEDURE — G0151 HHCP-SERV OF PT,EA 15 MIN: HCPCS

## 2023-03-13 NOTE — TELEPHONE ENCOUNTER
Cardiac Surgery Discharge - Follow up call placed to Zayra Coburn. Reviewed plan of care after discharge and encouraged Zayra Coburn to verbalize. Discussed precautions and reviewed medications, patient without questions regarding medications. Encouraged continued use of the incentive spirometer. Confirmed follow up appts and reinforced importance of wearing red reminder bracelet. Zayra Irish is without questions or concerns.

## 2023-03-13 NOTE — HOME HEALTH
Subjective: \"I haven't had any pain at all. \"  Falls since last visit NO(if yes complete the Fall Tracking Form and include bsrifallreport):   Caregiver involvement changes: No  Home health supplies by type and quantity ordered/delivered this visit include: n/a    Clinician asked if patient has had any physician contact since last home care visit and patient states: NO  Clinician asked if patient has any new or changed medications and patient states:  NO   If Yes, were medications reconciled? N/A   Was the certifying physician notified of changes in medications? N/A     Clinical assessment (what this visit means for the patient overall and need for ongoing skilled care) and progress or lack of progress towards SPECIFIC goals: Pt at risk for rehospitalization r/t fall risk, infection, incision exacerbation. Written Teaching Material Utilized: N/A    Interdisciplinary communication with: N/A for the purpose of n/a    Discharge planning as follows:  When goals are met    Specific plan for next visit: Assessment, incision assessment, education as needed

## 2023-03-13 NOTE — HOME HEALTH
Skilled reason for admission/summary of clinical condition: 76 yr old male was referred to homecare PT s/p Hosp-  [facility] 02-24 to 02-28 at [facility] and then transferred to [facility] from 02-28 to 03-09 for chest discomfort and ended up with CABG x 3 and AVR ( Aortic valve replacement). PMH- HTN and hyperlipidemia. PLOF- Pt lives in one level house with basement. Pt lives in indep [facility] facility and per wife he was not doing any walking or es. But they would walk to the [place] which is 5 mins walk for dinner sometimes. He ws active at home and helped with his chores. Pt was indep with his ADLs,ADLs, without ad. Falls-no falls. Vision good glasses. Hearing - both ears. BM- no issues. Appetite- Good. Memory- not so good. Diagnosis:CABG x 3 and AVR. Chest incision clean and no drainage. Subjective:I am good- I have always been indep and want to be indep. Caregiver: spouse. Caregiver assists with: Medications, Meals, Bathing, ADL, IADL, Transportation and Housekeeping Caregiver unable to assist with: Wound care. Caregiver is available 24 hours/day Caregiver is present at this visit and did participate with clinician. Medications reconciled and all medications are available in the home this visit. Patient/CG able to demonstrate knowledge through teach back with 75 percent accuracy. Medications are effective at this time. . A list of reconciled medications has been given to the patient/caregiver and a copy has been uploaded to media. Home health supplies by type and quantity ordered/delivered this visit include: none  Patient/caregiver instructed on plan of care and are agreeable to plan of care at this time. Clinician reviewed orientation to home health booklet with patient/caregiver including agency phone number, agency complaint process, state hotline number, as well as joint commission's quality hotline number. Consent forms signed.      Patient at risk for falls Yes:   Recommended requesting PT/OT orders due to fall risk YES:   Patient response to recommended requesting of PT/OT orders: agreeable    Discharge planning discussed with patient and caregiver. Discharge planning as follows: Is no longer homebound, Per physician order, When patient is no longer able to participate or progresses to SNF/Hospice, Will discharge when the patient has reached their maximum functional potential and maximum safety in their home and When goals are met Patient/caregiver did verbalize agreement with discharge planning. Clinical Assessment (What this means for the patient overall and need for ongoing skilled care):77 yr old male who lives in [place] with supportive spouse. Per wife and pt he was indep with his ADLs and IADL's, was walking without ad, was able to walk to the main [facility]- restaurant for dinner freq. Pt is now weak and tired from his Sx.  B le strength 3-3+/5. Bed mob- pt needs VC to slow down and roll on side. Transfers- pt has diff getting up from low surfaces. Amb- pt Ambulated indoors 80 x 1 with one sitting rest in between and was SOB. Modified Morgan's 4/10. Tinetti-  8/28 indicating mod risk of falls. Written Teaching Material Utilized: used booklet from the hospital for UE exs. Instructed pt on Amb program and demonstrated elevating B LES using a stool and pillows. PT also instructed pt on suing spirometer regularly and doing Deep breathing exs ( 5 reps) every hr. Specific plan for next visit: increase strengthen in B LES, transfers, gait. Plan of care and admission to home health status called to attending physician. The following practitioner has agreed to sign the ongoing POC Dr Valarie Ko , and was notified of the following: visit frequency of PT 3 week 2, 2 week 1. Interdisciplinary communication with:  for the purpose of POC collaboration    PCP: Dr Emerson Gaitan  Next scheduled doctor appointment: Dr Valarie Ko on 03/14 virtual apt.   Patient/Caregiver instructed to keep follow up appointment because lack of follow through with physician appointments could result in discontinuation of home care services for non-compliance. Patient/Caregiver verbalize knowledge of above through teach back with 100 percent accuracy. Emergency Preparedness: Patient/Caregiver instructed in the following:  Have one gallon of water per person for at least 3 days on hand. Have non-perishable food for at least 3 days that do not need to be cooked. Have flashlights and batteries. Charge your cell phones and any back up lithium batteries for your cell phones. Have 3+ days of back up oxygen in your home. Have a phone in your home that is hard wired and does not require power. Have medication for a week in your home. Make sure you have a caregiver in the home to provide care in case your home health nurse cannot get to your house. Make sure you have all of your paperwork i.e. written emergency preparedness plan, Identification, insurance cards, DME phone number, physician and pharmacy phone number, agency phone number, and your medications in one place for easy access and in a zip lock bag to protect them. Take your Admission Handbook, written emergency preparedness plan, written medication list, necessary supplies and folder if you relocate in the event of an emergency, if possible. Call agency if you relocate so we can contact you. Patient/Caregiver verbalize knowledge of above through teach back with 100 percent accuracy.

## 2023-03-14 ENCOUNTER — OFFICE VISIT (OUTPATIENT)
Dept: CARDIOLOGY CLINIC | Age: 78
End: 2023-03-14
Payer: MEDICARE

## 2023-03-14 ENCOUNTER — HOME CARE VISIT (OUTPATIENT)
Dept: SCHEDULING | Facility: HOME HEALTH | Age: 78
End: 2023-03-14
Payer: MEDICARE

## 2023-03-14 VITALS
HEART RATE: 80 BPM | BODY MASS INDEX: 33.14 KG/M2 | OXYGEN SATURATION: 80 % | DIASTOLIC BLOOD PRESSURE: 74 MMHG | WEIGHT: 237.6 LBS | SYSTOLIC BLOOD PRESSURE: 95 MMHG | RESPIRATION RATE: 18 BRPM | TEMPERATURE: 97.9 F

## 2023-03-14 DIAGNOSIS — Z95.1 S/P CABG X 3: ICD-10-CM

## 2023-03-14 DIAGNOSIS — Z95.2 S/P AVR: Primary | ICD-10-CM

## 2023-03-14 PROCEDURE — 99024 POSTOP FOLLOW-UP VISIT: CPT | Performed by: THORACIC SURGERY (CARDIOTHORACIC VASCULAR SURGERY)

## 2023-03-14 PROCEDURE — G0151 HHCP-SERV OF PT,EA 15 MIN: HCPCS

## 2023-03-14 PROCEDURE — G0152 HHCP-SERV OF OT,EA 15 MIN: HCPCS

## 2023-03-14 RX ORDER — LANOLIN ALCOHOL/MO/W.PET/CERES
5 CREAM (GRAM) TOPICAL
Qty: 100 TABLET | Refills: 0
Start: 2023-03-14

## 2023-03-14 NOTE — HOME HEALTH
Subjective: The OT was here this morning. No new c/o vocied. Pts O2 levels were low today. HAd to have him do deep breahtign exs adn came back to 98. Pt wanted to know if he can walk outside with wife alisha. Instructed pt not walk or  to do any seated or stand exs because he doesn't have a pulseox- wife going to get on today. Falls since last visit (if yes include bsrifallreport): no     Caregiver involvement:    Home health supplies by type and quantity ordered/delivered this visit include: no    Does the patient have any new or changed medications? No  If Yes, were medications reconciled? NA  Was the certifying physician notified of changes in medications? NA    Clinical assessment (what this visit means for the patient overall and need for ongoing skilled care): Today's session included B le ex sin sitting and Standing with rests in between. Amb without ad indoors with VC to slow down during turns. Pt continues to have decreased functional mobility (due weakness, decreased endurance. to diff with transfers, diff getting into showers, diff going up stairs, unable to walk  household/ community distances,  desaturates to below 90 with exs/Amb,   Progress or lack of progress toward specific goals:   Making slow progress with (strength,  )  goal but endurance, balance, Amb goal is still not met.            Interdisciplinary communication: none  Discharge planning: DC when goals met  Specific plan for next visit: increase strength in B LES, transfers, gait

## 2023-03-14 NOTE — PROGRESS NOTES
Patient: Jadene Island   Age: 76 y.o. Patient Care Team:  Leann Jefferson MD as PCP - General (Family Medicine)  Chao Javed MD (Cardiovascular Disease Physician)  Howard Garcia MD (Cardiothoracic Surgery)    Diagnosis: The primary encounter diagnosis was S/P AVR. A diagnosis of S/P CABG x 3 was also pertinent to this visit. Problem List:   Patient Active Problem List   Diagnosis Code    [facility] I21.4    Chest pain R07.9    S/P AVR Z95.2        This service was provided thru telehealth, between Madalyn Aguilar NP at Cardiac Surgery Specialist's office and [facility]. Date of Surgery: 03/03/2023 with Dr. Ruby Darling    Surgery:   1. CABG x 3.              Left internal mammary artery to LAD. Reverse saphenous vein graft to R-PDA. Reverse saphenous vein graft to OM. 2. EVH of the left leg. 3. Epiaortic Ultrasound. 4. Tissue AVR with #21 CE Inspiris. HPI:  Pt is being called for his 5 day follow up post CABG and AVR. He states he is doing well and feels like he is improving daily. He stated he did call the office and talk to someone on night one from discharge with complaints of a \"squeaking, almost like a kitten sound\" coming from his chest when he laid down at night. He stated it continued even when he held his breath. He was otherwise asymptomatic and no interventions were taken. In my opinion, most likely a pericardial rub. Patient states has resolved since then. He denies any chest pain, SOB, palpitations, or dizziness. His pain is controlled with PRN Tylenol and he states really only notices with coughing. He states his appetite has been good and he is having BM's. He states he is having some issues with insomnia, particularly falling back asleep when he gets up to void in the night. Last night, he slept in the recliner and stated it was the best night of sleep he had since discharge. Current Medications:   Current Outpatient Medications   Medication Sig Dispense Refill    melatonin 3 mg tablet Take 1.5 Tablets by mouth nightly as needed for Insomnia. 100 Tablet 0    vitamin E (AQUA GEMS) 268 mg (400 unit) capsule Take 400 Units by mouth daily. 1 tab      coenzyme q10 (Co Q-10) 300 mg cap Take 1 Capsule by mouth daily. ascorbic acid, vitamin C, (VITAMIN C) 1,000 mg tablet Take 1,000 mg by mouth daily. 1 tab      TAMARIND SEED-TURMERIC EXTRACT PO Take 125 mg by mouth daily. 1 tab      multivit-min/FA/lycopen/lutein (MEN 50 PLUS MULTIVITAMIN PO) Take 1 Tablet by mouth daily. omega 3-DHA-EPA-fish oil 1,000 mg (120 mg-180 mg) capsule Take 1 Capsule by mouth daily. psyllium husk (GENFIBER, PSYLLIUM, PO) Take 2 Capsules by mouth two (2) times a day. acetaminophen (TYLENOL) 325 mg tablet Take 2 Tablets by mouth every four (4) hours as needed for Pain or Fever. 100 Tablet 0    amiodarone (CORDARONE) 200 mg tablet Take 2 Tablets by mouth every twelve (12) hours. Take 2 tablets 2 times daily for 14 days then decrease to one tablet twice daily for 14 days then stop medication 84 Tablet 0    aspirin 81 mg chewable tablet Take 1 Tablet by mouth daily. 100 Tablet 0    furosemide (LASIX) 40 mg tablet Take 1 Tablet by mouth daily for 7 days. 7 Tablet 0    metoprolol succinate (TOPROL-XL) 25 mg XL tablet Take 0.5 Tablets by mouth daily. Indications: prevention of post cardio-thoracic surgery atrial fibrillation 15 Tablet 3    polyethylene glycol (MIRALAX) 17 gram packet Take 1 Packet by mouth daily. 30 Packet 0    potassium chloride (KLOR-CON M10) 10 mEq tablet Take 1 Tablet by mouth daily. 7 Tablet 0    rosuvastatin (CRESTOR) 10 mg tablet Take 10 mg by mouth nightly. 1 tab      ezetimibe (ZETIA) 10 mg tablet Take 10 mg by mouth daily. 1 tab         Vitals: There were no vitals taken for this visit. Allergies: is allergic to antivert/25.     Physical Exam: Telehealth, unable to complete  Wounds:     Lungs:     Heart:     Extremities:     Assessment/Plan: Aortic Stenosis s/p AVR tissue: ECHO at discharge with stable placement, no regurgitation or stenosis  - Continue ASA  - Antibiotic and valve card given to patient     CAD s/p NSTEMI and CABG x 3:   - Continue ASA 81mg, Crestor, and Toprol XL  - No Plavix (with recent NSTEMI) due to post-op thrombocytopenia (plt 146,000 at discharge)     Postop afib: one episode afib rvr on 3/6. Converted to SR with amio bolus and gtt. Remained in SR at discharge  - Continue post-operative amiodarone taper for 1 month    HTN: PTA medications Micardis (telmisartan) 20mg daily. Losartan was started inpatient both on hold for intolerant blood pressure  - Continue Toprol XL 12.5mg daily, BP stable 110-120s per [facility] charting    Dyslipidemia: PTA Crestor 10mg and Zetia 10mg  - Inpatient Crestor had been increased to 20mg, LFTs elevated mildly, reduced back to 10mg at discharge  - Continue Zetia     Hypervolemia (non-cardiac) States edema in LE is improving, has lost 14lbs since discharge 252lbs --> 237lbs   - lasix 40 mg po daily x 7 days at discharge, complete current prescription   - potassium 10 meq daily to be taken with lasix only for 7 days  - Advised patient to call us if notes increased LE edema or SOB once lasix completes     Hiatal Hernia: Noted on xray on admission  - No acute interventions     Pt is ready to start cardiac rehab once Home Health completes    Rehab - Encouraged  Walking: Encouraged  Glucometer: NA  Antibiotic card for valves: Patient has    Assessment: Mr. Katerina Mcfarlane is doing well post-operatively. He seems to still have some LE edema but has had significant weight loss since discharge and states his legs have greatly improved. VS are stable. Home Health can remove the Prineo dressing the next time they visit. They asked to move their follow up appointment with Dr. Malachi Huggins due to a previous commitment.  We will move it to April 11th at 2pm.     Signed By: Thomas Del Cid NP     March 14, 2023

## 2023-03-15 ENCOUNTER — TELEPHONE (OUTPATIENT)
Dept: CARDIOLOGY CLINIC | Age: 78
End: 2023-03-15

## 2023-03-15 ENCOUNTER — HOME CARE VISIT (OUTPATIENT)
Dept: SCHEDULING | Facility: HOME HEALTH | Age: 78
End: 2023-03-15
Payer: MEDICARE

## 2023-03-15 VITALS
BODY MASS INDEX: 33.03 KG/M2 | SYSTOLIC BLOOD PRESSURE: 100 MMHG | DIASTOLIC BLOOD PRESSURE: 60 MMHG | HEART RATE: 64 BPM | TEMPERATURE: 97.8 F | OXYGEN SATURATION: 96 % | RESPIRATION RATE: 18 BRPM | WEIGHT: 236.8 LBS

## 2023-03-15 PROCEDURE — G0299 HHS/HOSPICE OF RN EA 15 MIN: HCPCS

## 2023-03-15 NOTE — HOME HEALTH
Subjective: \"Once I'm able to get outside walking I think that will help with this swelling. \"  Falls since last visit NO(if yes complete the Fall Tracking Form and include bsrifallreport):   Caregiver involvement changes: none  Home health supplies by type and quantity ordered/delivered this visit include: none    Clinician asked if patient has had any physician contact since last home care visit and patient states: YES  Clinician asked if patient has any new or changed medications and patient states:  NO   If Yes, were medications reconciled? N/A   Was the certifying physician notified of changes in medications? N/A     Clinical assessment (what this visit means for the patient overall and need for ongoing skilled care) and progress or lack of progress towards SPECIFIC goals: Pt. seen for prineo dressing removal and education on post-cardiac sternal precautions, pain management, infection prevention and fall prevention. Incision d/i, no s/x. infection noted. Visits are needed to meet goals. Written Teaching Material Utilized: N/A    Interdisciplinary communication with: NP with surgeon to report pt/wife's concern about continued swelling in legs, and only one dose left of diuretic.      Discharge planning as follows: Per physician order    Specific plan for next visit: Educate on a fib, CAD

## 2023-03-16 ENCOUNTER — HOME CARE VISIT (OUTPATIENT)
Dept: SCHEDULING | Facility: HOME HEALTH | Age: 78
End: 2023-03-16
Payer: MEDICARE

## 2023-03-16 VITALS
BODY MASS INDEX: 32.86 KG/M2 | TEMPERATURE: 97.8 F | SYSTOLIC BLOOD PRESSURE: 100 MMHG | HEART RATE: 102 BPM | OXYGEN SATURATION: 98 % | DIASTOLIC BLOOD PRESSURE: 62 MMHG | WEIGHT: 235.6 LBS

## 2023-03-16 DIAGNOSIS — Z95.2 S/P AVR: Primary | ICD-10-CM

## 2023-03-16 PROCEDURE — G0151 HHCP-SERV OF PT,EA 15 MIN: HCPCS

## 2023-03-16 RX ORDER — POTASSIUM CHLORIDE 750 MG/1
20 TABLET, EXTENDED RELEASE ORAL DAILY
Qty: 10 TABLET | Refills: 0 | Status: SHIPPED | OUTPATIENT
Start: 2023-03-17 | End: 2023-03-22

## 2023-03-16 RX ORDER — FUROSEMIDE 40 MG/1
40 TABLET ORAL DAILY
Qty: 5 TABLET | Refills: 0 | Status: SHIPPED | OUTPATIENT
Start: 2023-03-16 | End: 2023-03-21

## 2023-03-16 NOTE — TELEPHONE ENCOUNTER
Called Mr. Mundo Bowles back and discussed his LE edema. He states it is still present but actually feels better today and he thinks it is improving. He states he took his oxygen level today and it was low 90s. He denies feeling SOB or light headed. He states his hands have been really cold the last few days. Given new hypoxia concerns and continued LE edema, I refilled his lasix and potassium chloride prescriptions for an additional 5 days.      Signed By: Simran Mckeon NP     March 16, 2023

## 2023-03-17 ENCOUNTER — HOME CARE VISIT (OUTPATIENT)
Dept: SCHEDULING | Facility: HOME HEALTH | Age: 78
End: 2023-03-17
Payer: MEDICARE

## 2023-03-17 VITALS
HEART RATE: 75 BPM | SYSTOLIC BLOOD PRESSURE: 122 MMHG | DIASTOLIC BLOOD PRESSURE: 70 MMHG | OXYGEN SATURATION: 96 % | BODY MASS INDEX: 32.62 KG/M2 | TEMPERATURE: 97.7 F | RESPIRATION RATE: 18 BRPM | WEIGHT: 233.9 LBS

## 2023-03-17 VITALS
WEIGHT: 237.6 LBS | BODY MASS INDEX: 33.14 KG/M2 | DIASTOLIC BLOOD PRESSURE: 64 MMHG | TEMPERATURE: 97.7 F | SYSTOLIC BLOOD PRESSURE: 112 MMHG | HEART RATE: 75 BPM | OXYGEN SATURATION: 92 %

## 2023-03-17 PROCEDURE — G0299 HHS/HOSPICE OF RN EA 15 MIN: HCPCS

## 2023-03-17 NOTE — HOME HEALTH
Skilled reason for admission/summary of clinical condition:   Pt. hospitalized at [address] [place] [address] 02/24/23-02/28/23 following NSTEMI, s/p cardiac cath. Pt. then transferred to [facility] 02/28/23 to undergo AVR, CABG X3.   Pt. with post-op a-fib, thrombocytopenia, anemia, s/p transfusion. Pt. discharged 03/09/23. Diagnosis / PMH:   CAD, HTN, hyperlipidemia, hiatal hernia. Subjective:   \"I drove myself to the hospital.   I knew my chest didn't feel right. \"  Caregiver: wife. Caregiver assists with: Medications, Meals, ADL, IADL, Transportation and Housekeeping. Caregiver unable to assist with: nothing. Caregiver is available 24 hours/day. Caregiver is present at this visit and did participate with clinician. Medications reviewed. Pt. / caregiver deny any changes. Home health supplies by type and quantity ordered/delivered this visit include:  N/A. Patient/caregiver instructed on plan of care and are agreeable to plan of care at this time. Patient at risk for falls:  Yes. Recommended requesting PT/OT orders due to fall risk:   N/A.   PT/OT already ordered. Patient response to recommended requesting of PT/OT orders:  N/A. Discharge planning discussed with patient and caregiver. Discharge planning as follows: No further O.T. intervention planned at this time. Patient/caregiver did verbalize agreement with discharge planning. Clinical Assessment (What this means for the patient overall and need for ongoing skilled care): Purpose / Goals of O.T. intervention reviewed. Assessment completed. Pt. demonstrates safe household mobility without use of an assistive device. Pt. demonstrates an understanding of \"move in the tube\" precautions and states his wife keeps a close eye on him and reminds him if he tries to move incorrectly. Wife is very supportive.    Pt. and wife showed O.T. the checklists they are keeping to document when execises are done (pt. has HEP from the hospital) and weights and BP. Pt. is generally completing ADLs with supervision only, although wife reports she is available to help as needed. O.T. instructed pt. on kicthen mobility and what he can safely access. Pt. declines the need for additional O.T. intervention at this time. O.T. instructed pt. and wife to call to schedule outpt. cardiac rehab as there is usually a a waiting list to get in. This will assure a smooth transition from home health P.T. (which pt. will continue) into cardiac rehab. See Interventions / Goals for additional details. Written Teaching Material Utilized: None this visit. Specific plan for next visit:  N/A.     Interdisciplinary communication with:  PT for the purpose of POC collaboration

## 2023-03-17 NOTE — HOME HEALTH
Subjective: I am doing much better. We got a pulse ox and it has been above 90 but once it went to 88. Falls since last visit (if yes include bsrifallreport): no     Caregiver involvement:    Home health supplies by type and quantity ordered/delivered this visit include: no    Does the patient have any new or changed medications? No  If Yes, were medications reconciled? NA  Was the certifying physician notified of changes in medications? NA    Clinical assessment (what this visit means for the patient overall and need for ongoing skilled care): Today's session included B Le exs in sitting- Laq,HF ,H Abd, Ap x 15. Stand exs with support x 10. Amb without ad indoors 70 x 2. Pt continues to have decreased functional mobility (due to weakness in B LE's, decreased balance, endurance,diff with transfers, diff getting into showers, diff going up stairs, unable to walk  household/ community distances,  desaturates to below 90 with exs/Amb,     Progress or lack of progress toward specific goals:   Making good progress with (strength )  goal but endurance, balance, Amb goal is still not met.               Interdisciplinary communication: none  Discharge planning: DC when goals met  Specific plan for next visit: increase strength in B LES, transfers, gait

## 2023-03-17 NOTE — HOME HEALTH
Subjective: \"I went twice yesterday walking in the neighborhood, getting stronger every day. \"   Falls since last visit NO(if yes complete the Fall Tracking Form and include bsrifallreport):   Caregiver involvement changes: none  Home health supplies by type and quantity ordered/delivered this visit include: none    Clinician asked if patient has had any physician contact since last home care visit and patient states: NO  Clinician asked if patient has any new or changed medications and patient states:  NO   If Yes, were medications reconciled? N/A   Was the certifying physician notified of changes in medications? N/A     Clinical assessment (what this visit means for the patient overall and need for ongoing skilled care) and progress or lack of progress towards SPECIFIC goals: Pt. seen for education on post-cardiac surgical precautions, HTN, A fib and pain management. Incision d/i. Visits are needed to meet goals. Written Teaching Material Utilized: N/A    Interdisciplinary communication with: NP regarding wife's question regarding Potassium dose. NP called pt. to notify him to continue lasix and potassium 5 more days due to continued swelling.     Discharge planning as follows: Per physician order    Specific plan for next visit: education on CAD, med regimen

## 2023-03-20 ENCOUNTER — HOME CARE VISIT (OUTPATIENT)
Dept: SCHEDULING | Facility: HOME HEALTH | Age: 78
End: 2023-03-20
Payer: MEDICARE

## 2023-03-20 ENCOUNTER — TELEPHONE (OUTPATIENT)
Dept: CARDIOLOGY CLINIC | Age: 78
End: 2023-03-20

## 2023-03-20 VITALS
DIASTOLIC BLOOD PRESSURE: 74 MMHG | RESPIRATION RATE: 16 BRPM | BODY MASS INDEX: 32.09 KG/M2 | WEIGHT: 230.1 LBS | TEMPERATURE: 97.5 F | SYSTOLIC BLOOD PRESSURE: 122 MMHG | HEART RATE: 60 BPM | OXYGEN SATURATION: 92 %

## 2023-03-20 VITALS
TEMPERATURE: 98.7 F | BODY MASS INDEX: 32.09 KG/M2 | WEIGHT: 230.1 LBS | DIASTOLIC BLOOD PRESSURE: 61 MMHG | RESPIRATION RATE: 18 BRPM | SYSTOLIC BLOOD PRESSURE: 113 MMHG | OXYGEN SATURATION: 93 % | HEART RATE: 73 BPM

## 2023-03-20 PROCEDURE — G0300 HHS/HOSPICE OF LPN EA 15 MIN: HCPCS

## 2023-03-20 PROCEDURE — G0151 HHCP-SERV OF PT,EA 15 MIN: HCPCS

## 2023-03-20 NOTE — TELEPHONE ENCOUNTER
I called Bhavik Cecilio Squires back and he had complaints of some constipation. He will try miralax. If this does not work, he will call us back.

## 2023-03-20 NOTE — HOME HEALTH
Subjective: \"I was feeling rough this morning, until I had a good bowel movement. \"  Falls since last visit NO(if yes complete the Fall Tracking Form and include bsrifallreport):   Caregiver involvement changes: No  Home health supplies by type and quantity ordered/delivered this visit include: n/a    Clinician asked if patient has had any physician contact since last home care visit and patient states: NO  Clinician asked if patient has any new or changed medications and patient states:  NO   If Yes, were medications reconciled? N/A   Was the certifying physician notified of changes in medications? N/A     Clinical assessment (what this visit means for the patient overall and need for ongoing skilled care) and progress or lack of progress towards SPECIFIC goals: Pt at risk for rehospitalization r/t fall risk, infection, incision exacerbation. Written Teaching Material Utilized: N/A    Interdisciplinary communication with: N/A for the purpose of n/a    Discharge planning as follows:  When goals are met    Specific plan for next visit: Assessment, incision assessment, education as needed

## 2023-03-20 NOTE — HOME HEALTH
Subjective: I had constipation this morning and was uncomfortable- but I just had a BM and feel good. Falls since last visit (if yes include bsrifallreport): no     Caregiver involvement:  spouse  Home health supplies by type and quantity ordered/delivered this visit include: no    Does the patient have any new or changed medications? No  If Yes, were medications reconciled? NA  Was the certifying physician notified of changes in medications? NA    Clinical assessment (what this visit means for the patient overall and need for ongoing skilled care): Today's session included B Le ex sin sitting- Laq,HF, H ABd, Ap x 15 with rests and deep breathing. Stand exs at the sink- Heel toe raises, HA ms curls, H flex, marching x 10 with 2 stand rests. Amb without ad indoors 50 x 3. Pt continues to have decreased functional mobility (due to weakness, decreased endurance, balance,diff with transfers, diff getting into showers, diff going up stairs, unable to walk  household/ community distances,  desaturates to below 90 with exs/Amb,    Progress or lack of progress toward specific goals:   Making good progress with (strength, bed mob, transfers, gait )  goal but Amb goal is still not met. Interdisciplinary communication: none  Discharge planning: DC when goals met  Specific plan for next visit: increase strength in B LES, transfers gait.

## 2023-03-22 ENCOUNTER — HOME CARE VISIT (OUTPATIENT)
Dept: SCHEDULING | Facility: HOME HEALTH | Age: 78
End: 2023-03-22
Payer: MEDICARE

## 2023-03-22 VITALS
OXYGEN SATURATION: 98 % | RESPIRATION RATE: 17 BRPM | TEMPERATURE: 98.4 F | SYSTOLIC BLOOD PRESSURE: 106 MMHG | BODY MASS INDEX: 31.42 KG/M2 | DIASTOLIC BLOOD PRESSURE: 70 MMHG | WEIGHT: 225.3 LBS | HEART RATE: 80 BPM

## 2023-03-22 PROCEDURE — G0151 HHCP-SERV OF PT,EA 15 MIN: HCPCS

## 2023-03-23 ENCOUNTER — HOME CARE VISIT (OUTPATIENT)
Dept: SCHEDULING | Facility: HOME HEALTH | Age: 78
End: 2023-03-23
Payer: MEDICARE

## 2023-03-23 VITALS
TEMPERATURE: 97.5 F | HEART RATE: 71 BPM | SYSTOLIC BLOOD PRESSURE: 122 MMHG | DIASTOLIC BLOOD PRESSURE: 68 MMHG | BODY MASS INDEX: 31.76 KG/M2 | OXYGEN SATURATION: 95 % | RESPIRATION RATE: 18 BRPM | WEIGHT: 227.7 LBS

## 2023-03-23 PROCEDURE — G0299 HHS/HOSPICE OF RN EA 15 MIN: HCPCS

## 2023-03-23 NOTE — Clinical Note
Office called this morning and left message regarding pt. reporting 2 lb. weight gain since yesterday. Denies any worsening shortness of breath. Minimal edema noted to left ankle, none noted in left ankle. Pt. completed lasix 3/21. Pt. educated throughout 6 visits on cardiac disease process, sternal precautions, incision care and med regimen. He was discharged today 3/23 from [facility].

## 2023-03-23 NOTE — HOME HEALTH
Subjective: I feel good. getting stronger every day. Falls since last visit (if yes include bsrifallreport): no     Caregiver involvement: wife   Home health supplies by type and quantity ordered/delivered this visit include: no    Does the patient have any new or changed medications? No  If Yes, were medications reconciled? NA  Was the certifying physician notified of changes in medications? NA    Clinical assessment (what this visit means for the patient overall and need for ongoing skilled care): Today's session focused on slowing down during his sitting and stand exs. was able to maintain O2 levels above 90 when he did his exs slowly and took long breaks. Pt continues to have decreased functional mobility (due to weakness, decreased endurance,diff with transfers, diff getting into showers, diff going up stairs, unable to walk  household/ community distances,  desaturates to below 90 with exs/amb,    Progress or lack of progress toward specific goals:   Making good progress with (strength,transfers, )  goal but Amb goal is still not met.              Interdisciplinary communication: none  Discharge planning: DC when goals met  Specific plan for next visit:increase strength in B Le's, transfers,gait

## 2023-03-23 NOTE — HOME HEALTH
Subjective: \"I haven't needed to take tylenol for several days. \"  Falls since last visit NO(if yes complete the Fall Tracking Form and include bsrifallreport):   Caregiver involvement changes: none  Home health supplies by type and quantity ordered/delivered this visit include: none    Clinician asked if patient has had any physician contact since last home care visit and patient states: NO  Clinician asked if patient has any new or changed medications and patient states:  NO   If Yes, were medications reconciled? N/A   Was the certifying physician notified of changes in medications? N/A     Clinical assessment (what this visit means for the patient overall and need for ongoing skilled care) and progress or lack of progress towards SPECIFIC goals: Pt. educated throughout 6 visits on post-cardiac sternal precautions, CAD, HTN, pain management, fall prevention, incision care and medication regimen. Pt. has met goals, no further SN visits needed. Written Teaching Material Utilized: discharge instructions    Interdisciplinary communication with: MD office called to report weight gain 2 lbs since yesterday.      Discharge planning as follows: Per physician order    Specific plan for next visit: N/A

## 2023-03-24 ENCOUNTER — TELEPHONE (OUTPATIENT)
Dept: CARDIOLOGY CLINIC | Age: 78
End: 2023-03-24

## 2023-03-24 ENCOUNTER — HOME CARE VISIT (OUTPATIENT)
Dept: SCHEDULING | Facility: HOME HEALTH | Age: 78
End: 2023-03-24
Payer: MEDICARE

## 2023-03-24 VITALS
BODY MASS INDEX: 31.53 KG/M2 | WEIGHT: 226.1 LBS | OXYGEN SATURATION: 98 % | DIASTOLIC BLOOD PRESSURE: 70 MMHG | TEMPERATURE: 97 F | RESPIRATION RATE: 18 BRPM | HEART RATE: 85 BPM | SYSTOLIC BLOOD PRESSURE: 112 MMHG

## 2023-03-24 PROCEDURE — G0151 HHCP-SERV OF PT,EA 15 MIN: HCPCS

## 2023-03-24 NOTE — TELEPHONE ENCOUNTER
Notified by home health of 2 lb weigh increase on 3/23, contacted patient to follow up morning of 3/24, weight was back to normal this am, denies edema, shortness of breath, orthopnea, PND, states \"doing well\" and has PT today

## 2023-03-25 NOTE — HOME HEALTH
Subjective: I am enjoying the good weather. I walked outside for 10 min's with my wife today. Falls since last visit (if yes include bsrifallreport): no    Caregiver involvement: spouse   Home health supplies by type and quantity ordered/delivered this visit include: no    Does the patient have any new or changed medications? No  If Yes, were medications reconciled? NA  Was the certifying physician notified of changes in medications? NA    Clinical assessment (what this visit means for the patient overall and need for ongoing skilled care): Pt continues to be progressing well with his strength in B LES. We increased sitting exs to 20 reps but he was not able to do 20 reps -had to do 15 reps take a rest and then do 5 more reps. Stand exs 15 reps with vc to slow down and take rests. Gets tired easy  Pt continues to have decreased functional mobility (due to decreased endurance,diff with transfers, diff getting into showers, diff going up stairs, unable to walk  household/ community distances,  desaturates to below 90 with exs/amb,  TUG   ,    Progress or lack of progress toward specific goals:   Making good progress with (strength)  goal but endurance,Amb goal is still not met.            Interdisciplinary communication: none  Discharge planning: DC when goals met  Specific plan for next visit: increase strengthening B LES, transfers, gait

## 2023-03-27 ENCOUNTER — HOME CARE VISIT (OUTPATIENT)
Dept: HOME HEALTH SERVICES | Facility: HOME HEALTH | Age: 78
End: 2023-03-27
Payer: MEDICARE

## 2023-03-27 ENCOUNTER — TELEPHONE (OUTPATIENT)
Dept: CARDIOLOGY CLINIC | Age: 78
End: 2023-03-27

## 2023-03-27 NOTE — TELEPHONE ENCOUNTER
Called Mr. Mary Jensen back regarding a phone call this morning. He inquired about what he could take for ongoing constipation. He is currently not taking anything. I recommended resuming his pericolace and miralax (confirmed he still has some) and that if those do not work, would buy a bisacodyl suppository from the store and try that. If none of the above works, he will call our office and we can discuss further management. Additionally, he had an episode of dizziness today that lasted a few minutes. He denied any orthostatic change, was sitting at a table after breakfast. No other issues since. He is checking his VS multiple times a day and they have been normal, no hypotension. I asked if he felt dehydrated or was drinking enough but he stated he felt like he was drinking lots of water and is actually voiding several times a night which is interrupting his sleep pattern. He denies any noticeable edema. I discussed with him that we should monitor for now and if he has another episode of dizziness, I want him to call our office so we can arrange follow-up appointment earlier than his 1 month. Lastly, if frequency continues to occur, we could evaluate a BPH medication.      Signed By: Keyla Mckeon NP     March 27, 2023

## 2023-03-29 ENCOUNTER — HOME CARE VISIT (OUTPATIENT)
Dept: SCHEDULING | Facility: HOME HEALTH | Age: 78
End: 2023-03-29
Payer: MEDICARE

## 2023-03-29 VITALS
DIASTOLIC BLOOD PRESSURE: 72 MMHG | WEIGHT: 224.2 LBS | TEMPERATURE: 98.3 F | HEART RATE: 78 BPM | SYSTOLIC BLOOD PRESSURE: 114 MMHG | BODY MASS INDEX: 31.27 KG/M2 | RESPIRATION RATE: 16 BRPM | OXYGEN SATURATION: 97 %

## 2023-03-29 PROCEDURE — G0151 HHCP-SERV OF PT,EA 15 MIN: HCPCS

## 2023-03-29 NOTE — HOME HEALTH
Subjective:Pt said he is feeling much better- went out to get his venkata and pedicare this morning, walked to the club house for lunch. Is ready for DC from home care. 224.2lbs,    Falls since last visit (if yes include bsrifallreport): no      Caregiver involvement:  Spouse  Home health supplies by type and quantity ordered/delivered this visit include: no    Does the patient have any new or changed medications? No  If Yes, were medications reconciled? NA  Was the certifying physician notified of changes in medications? NA    Clinical assessment (what this visit means for the patient overall and need for ongoing skilled care): Norma Villareal Pt was referred to homecare PT s/p hops for CABG. He has made good progress- Is indep with his transfers and bed mob. B Le muscle strength  4/5  Transfers-indep  Amb- walks indep without ad indoors and  outside with dist sup 300+ ft  Tinetti- 24/28 indicating decreased risk of falls. TUG 16 secs  Progress or lack of progress toward specific goals: all goals met.   Interdisciplinary communication: with pt and spouse  Discharge planning: DC

## 2023-03-31 ENCOUNTER — TELEPHONE (OUTPATIENT)
Dept: CARDIOLOGY CLINIC | Age: 78
End: 2023-03-31

## 2023-03-31 NOTE — TELEPHONE ENCOUNTER
Mr. Blevins Shira called to report he fell asleep with his hand under his incision and his soreness had increased and he was wondering if this was problem. He denies incisional openings, drainage, and sternal click/popping. I assured him he was probably sore from the extra pressure but if he didn't feel any clicking he was ok. He will take some tylenol tonight before bed.

## 2023-04-03 ENCOUNTER — HOSPITAL ENCOUNTER (OUTPATIENT)
Dept: CARDIAC REHAB | Age: 78
End: 2023-04-03
Payer: MEDICARE

## 2023-04-03 PROCEDURE — 93798 PHYS/QHP OP CAR RHAB W/ECG: CPT

## 2023-04-03 NOTE — CARDIO/PULMONARY
[facility] Cardiopulmonary Rehab Orientation:  Met with Lei Holt, : 1945, for cardiac rehab orientation and exercise tolerance test today. Mr Alesia Quinn is a 76year-old patient of Lore Cisse, S/P CABG X 3 with bio AVR (3/3/23), following NSTEMI (23). Pt had no previous cardiac hx. LVEF 55-60%. Cardiac risk factors include: smoking, HTN, HLD, obesity, sedentary lifestyle, age, gender, and family hx. BMI 32. Pt smoked up to 1 ppd cigarettes from ages [age]. He then smoked several cigars per month, until his heart attack. CAD risk factors were reviewed with patient. Pt resides with his wife of 28 years in [place]. 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 He has 2 daughters who live locally and are supportive. Also has a son in [place]. Depression score PHQ9 is 1 and this is considered a very low score. The result was discussed with patient, who affirms score to be accurate. Pt reported he is a \"happy person\" and feels good about his recovery from surgery. His primary worry has been his 47 yo son, who has high functioning autism and lives alone in [facility]. Patient denied chest pain or SOB during 6 minute exercise tolerance test on treadmill at 2 mph, with peak HR 84 peak /72, and RPE 11. Cardiac rhythm was SR with an isolated PAC. Limitations to exercise are primarily related to sternal precautions & deconditioning. Pt has been exercising at home by doing daily post-op stretches & walking outdoors. He enjoys playing video games. Pt was given the Cardiac Rehab manual and an exercise plan was developed. Pt will attend cardiac rehab 2-3 times per week. Pt verbalized plan continue his home exercise of walking.

## 2023-04-13 ENCOUNTER — HOSPITAL ENCOUNTER (OUTPATIENT)
Dept: CARDIAC REHAB | Age: 78
Discharge: HOME | End: 2023-04-13
Payer: MEDICARE

## 2023-04-13 VITALS — WEIGHT: 229.6 LBS | BODY MASS INDEX: 32.02 KG/M2

## 2023-04-13 PROCEDURE — 93797 PHYS/QHP OP CAR RHAB WO ECG: CPT

## 2023-04-13 PROCEDURE — 93798 PHYS/QHP OP CAR RHAB W/ECG: CPT

## 2023-04-17 ENCOUNTER — HOSPITAL ENCOUNTER (OUTPATIENT)
Dept: CARDIAC REHAB | Age: 78
Discharge: HOME | End: 2023-04-17
Payer: MEDICARE

## 2023-04-17 ENCOUNTER — OFFICE VISIT (OUTPATIENT)
Dept: CARDIOLOGY CLINIC | Age: 78
End: 2023-04-17
Payer: MEDICARE

## 2023-04-17 VITALS
WEIGHT: 225 LBS | OXYGEN SATURATION: 97 % | DIASTOLIC BLOOD PRESSURE: 78 MMHG | HEIGHT: 71 IN | RESPIRATION RATE: 19 BRPM | BODY MASS INDEX: 31.5 KG/M2 | SYSTOLIC BLOOD PRESSURE: 124 MMHG | HEART RATE: 76 BPM

## 2023-04-17 VITALS — WEIGHT: 228.9 LBS | BODY MASS INDEX: 31.93 KG/M2

## 2023-04-17 DIAGNOSIS — I25.10 CORONARY ARTERY DISEASE INVOLVING NATIVE CORONARY ARTERY OF NATIVE HEART WITHOUT ANGINA PECTORIS: ICD-10-CM

## 2023-04-17 DIAGNOSIS — I48.91 ATRIAL FIBRILLATION, UNSPECIFIED TYPE (HCC): Primary | ICD-10-CM

## 2023-04-17 DIAGNOSIS — Z09 HOSPITAL DISCHARGE FOLLOW-UP: ICD-10-CM

## 2023-04-17 DIAGNOSIS — Z95.2 S/P AVR: ICD-10-CM

## 2023-04-17 DIAGNOSIS — I10 PRIMARY HYPERTENSION: ICD-10-CM

## 2023-04-17 DIAGNOSIS — E78.5 HYPERLIPIDEMIA, UNSPECIFIED HYPERLIPIDEMIA TYPE: ICD-10-CM

## 2023-04-17 DIAGNOSIS — I50.32 CHRONIC HEART FAILURE WITH PRESERVED EJECTION FRACTION (HCC): ICD-10-CM

## 2023-04-17 DIAGNOSIS — Z95.1 S/P CABG X 3: ICD-10-CM

## 2023-04-17 PROCEDURE — 3074F SYST BP LT 130 MM HG: CPT

## 2023-04-17 PROCEDURE — 93000 ELECTROCARDIOGRAM COMPLETE: CPT

## 2023-04-17 PROCEDURE — 1111F DSCHRG MED/CURRENT MED MERGE: CPT

## 2023-04-17 PROCEDURE — 3078F DIAST BP <80 MM HG: CPT

## 2023-04-17 PROCEDURE — 93798 PHYS/QHP OP CAR RHAB W/ECG: CPT

## 2023-04-17 PROCEDURE — 1123F ACP DISCUSS/DSCN MKR DOCD: CPT

## 2023-04-17 PROCEDURE — 99214 OFFICE O/P EST MOD 30 MIN: CPT

## 2023-04-17 RX ORDER — ROSUVASTATIN CALCIUM 20 MG/1
20 TABLET, COATED ORAL
Qty: 90 TABLET | Refills: 1 | Status: SHIPPED | OUTPATIENT
Start: 2023-04-17

## 2023-04-17 NOTE — PROGRESS NOTES
Bassam Szymanski is a 76 y.o. male    Chief Complaint   Patient presents with    Hospital Follow Up       Vitals:    04/17/23 0853   BP: 124/78   BP 1 Location: Left upper arm   BP Patient Position: Sitting   BP Cuff Size: Large adult   Pulse: 76   Resp: 19   Height: 5' 11\" (1.803 m)   Weight: 225 lb (102.1 kg)   SpO2: 97%       Chest pain NO    SOB NO    Dizziness NO    Swelling NO    Refills NO      1. Have you been to the ER, urgent care clinic since your last visit? Hospitalized since your last visit? Hospital Follow Up    2. Have you seen or consulted any other health care providers outside of the [address] [place] [address] since your last visit? Include any pap smears or colon screening.  No

## 2023-04-17 NOTE — PROGRESS NOTES
Patient: Joyce Carter  : 1938    Primary Cardiologist: Mike Tabor MD  PCP: Jordi Parkinson MD    Today's Date: 2023      ASSESSMENT AND PLAN:     Assessment and Plan:  Hospital Follow Up  - admitted -3/9/23 NSTEMI s/p CABG, AVR    2. CAD s/p CABG x3 (3/3/23)  - cont asa, statin   - no plavix due to post op bleeding, thrombocytopenia, acute blood loss per CT surgery  - cont low dose toprol xl 12.5 mg   - post op afib - was on short course of amio - this has been stopped    3. AVR   - moderate AV stenosis, AI  - 21 mm saldivar tissue valve on 3/3/23  - check echo when able    4. HTN  - was on micardis 20 mg daily prior to surgery  - BP well controlled in office and at home    5. HLD  - was taking crestor 10 mg daily - will have pt take 20 mg daily  - continue zetia 10 mg    6. HFpEF (chronic)   - LVEF 55-60%   - required short course of lasix post op - denies shortness of breath, edema    7. Hiatal Hernia  - noted on CXR during admission  - asked to discuss with PCP       See Dr. Darling Ward as scheduled in 2023. Knows to contact clinic sooner if needed. ICD-10-CM ICD-9-CM    1. Atrial fibrillation, unspecified type ([facility] [address]  I48.91 427.31 AMB POC EKG ROUTINE W/ 12 LEADS, INTER & REP      2. S/P CABG x 3  Z95.1 V45.81       3. S/P AVR  Z95.2 V43.3 ECHO ADULT COMPLETE      4. Primary hypertension  I10 401.9       5. Coronary artery disease involving native coronary artery of native heart without angina pectoris  I25.10 414.01       6. Hyperlipidemia, unspecified hyperlipidemia type  E78.5 272.4       7. Chronic heart failure with preserved ejection fraction (HCC)  I50.32 428.9            HISTORY OF PRESENT ILLNESS:     History of Present Illness:  Joyce Carter is a 76 y.o. M who presents for hospital follow-up. Pt was admitted to [facility] on  with chest pain, NSTEMI.  Cardiac catheterization revealed multivessel CAD, was transferred to [facility] and had CABG x3 with  Tsirigotis. Also noted to have moderate AV stenosis and had AVR. Was noted to have Afib RVR post-op and converted with amio. Today is feeling really well. Participates in cardiac rehab. Denies chest pain, edema, syncope, shortness of breath at rest, dyspnea on exertion, PND or orthopnea. Has no tachycardia, palpitations or sense of arrhythmia. PAST MEDICAL HISTORY:     Past Medical History:   Diagnosis Date    Arrhythmia     CAD (coronary artery disease)     Hypercholesterolemia     Hypertension        Past Surgical History:   Procedure Laterality Date    HX TONSILLECTOMY  1977    Tonsils & Adenoids removed due to recurrent inflammation    MT UNLISTED PROCEDURE CARDIAC SURGERY         CURRENT MEDICATIONS:    .  Current Outpatient Medications   Medication Sig Dispense Refill    rosuvastatin (CRESTOR) 20 mg tablet Take 1 Tablet by mouth nightly. 1 tab 90 Tablet 1    vitamin E (AQUA GEMS) 268 mg (400 unit) capsule Take 1 Capsule by mouth daily. 1 tab      coenzyme q10 (Co Q-10) 300 mg cap Take 1 Capsule by mouth daily. ascorbic acid, vitamin C, (VITAMIN C) 1,000 mg tablet Take 1 Tablet by mouth daily. 1 tab      TAMARIND SEED-TURMERIC EXTRACT PO Take 125 mg by mouth daily. 1 tab      multivit-min/FA/lycopen/lutein (MEN 50 PLUS MULTIVITAMIN PO) Take 1 Tablet by mouth daily. omega 3-DHA-EPA-fish oil 1,000 mg (120 mg-180 mg) capsule Take 1 Capsule by mouth daily. psyllium husk (GENFIBER, PSYLLIUM, PO) Take 2 Capsules by mouth two (2) times a day. aspirin 81 mg chewable tablet Take 1 Tablet by mouth daily. 100 Tablet 0    metoprolol succinate (TOPROL-XL) 25 mg XL tablet Take 0.5 Tablets by mouth daily. Indications: prevention of post cardio-thoracic surgery atrial fibrillation 15 Tablet 3    polyethylene glycol (MIRALAX) 17 gram packet Take 1 Packet by mouth daily. 30 Packet 0    ezetimibe (ZETIA) 10 mg tablet Take 1 Tablet by mouth daily.  1 tab         Allergies   Allergen Reactions Antivert/25 Other (comments)     reports red faced, flushed in [address]:     Social History     Tobacco Use    Smoking status: Former     Packs/day: 1.00     Years: 12.00     Pack years: 12.00     Types: Cigarettes, Cigars     Quit date: 1970     Years since quittin.3     Passive exposure: Past    Smokeless tobacco: Never   Vaping Use    Vaping Use: Never used   Substance Use Topics    Alcohol use: Never    Drug use: Never       FAMILY HISTORY:     Family History   Problem Relation Age of Onset    No Known Problems Mother     Heart Disease Father     Stroke Sister     OSTEOARTHRITIS Sister     Heart Disease Brother        REVIEW OF SYMPTOMS:     Review of Symptoms:  Negative except as above, all other systems reviewed and are negative for a Comprehensive ROS (10+)    PHYSICAL EXAM:     Physical Exam:  Visit Vitals  /78 (BP 1 Location: Left upper arm, BP Patient Position: Sitting, BP Cuff Size: Large adult)   Pulse 76   Resp 19   Ht 5' 11\" (1.803 m)   Wt 225 lb (102.1 kg)   SpO2 97%   BMI 31.38 kg/m²       General appearance: alert, cooperative, no distress, appears stated age  Neck: supple, symmetrical, trachea midline, no adenopathy, thyroid: not enlarged, symmetric, no tenderness/mass/nodules, no carotid bruit, and no JVD  Lungs: clear to auscultation bilaterally  Heart: regular rate and rhythm, S1, S2 normal, no murmur, click, rub or gallop  Extremities: extremities normal, atraumatic, no cyanosis or edema    LABS / OTHER STUDIES:     Lab Results   Component Value Date/Time    Sodium 136 2023 02:38 AM    Potassium 4.5 2023 02:38 AM    Chloride 101 2023 02:38 AM    CO2 31 2023 02:38 AM    Anion gap 4 (L) 2023 02:38 AM    Glucose 105 (H) 2023 02:38 AM    BUN 23 (H) 2023 02:38 AM    Creatinine 0.86 2023 02:38 AM    BUN/Creatinine ratio 27 (H) 2023 02:38 AM    Calcium 8.6 2023 02:38 AM    Bilirubin, total 0.6 2023 04:34 AM    Alk. phosphatase 44 (L) 03/07/2023 04:34 AM    Protein, total 5.4 (L) 03/07/2023 04:34 AM    Albumin 2.6 (L) 03/07/2023 04:34 AM    Globulin 2.8 03/07/2023 04:34 AM    A-G Ratio 0.9 (L) 03/07/2023 04:34 AM    ALT (SGPT) 22 03/07/2023 04:34 AM    AST (SGOT) 44 (H) 03/07/2023 04:34 AM       Lab Results   Component Value Date/Time    Cholesterol, total 110 02/25/2023 02:28 AM    HDL Cholesterol 52 02/25/2023 02:28 AM    LDL, calculated 44.2 02/25/2023 02:28 AM    VLDL, calculated 13.8 02/25/2023 02:28 AM    Triglyceride 69 02/25/2023 02:28 AM    CHOL/HDL Ratio 2.1 02/25/2023 02:28 AM       CARDIAC DIAGNOSTICS:     Cardiac Evaluation Includes:  I reviewed the test results below. 02/28/23    ECHO ADULT FOLLOW-UP OR LIMITED 03/07/2023 3/7/2023    Interpretation Summary    Left Ventricle: Normal left ventricular systolic function with a visually estimated EF of 55 - 60%. Left ventricle size is normal. Normal wall thickness. Normal wall motion. Aortic Valve: Bioprosthetic valve. No regurgitation. No stenosis. Signed by: Dorette Epley, MD on 3/7/2023  1:09 PM          02/24/23    CARDIAC PROCEDURE 02/28/2023 2/28/2023    Conclusion  Access: R Radial Access - 6 F sheath    Difficulty advancing guide wire radial artery - angiogram; Small vessel/tortuos/lesion ( forearm) vs vasospasm    All catheter exchanges performed over guide wire    Difficult advance  6 F EBU guide for iFR ; Able to advance 5 F nowak 3.5 guide    Catheters: RCA : JR4  LCA : JL3.5    Findings:    L Main:Long/ Med to Large; MLI;    LAD: Med; Prox diffuse 60-70%; Mid - diffuse 70%    RI - Med; MLI    LCflex: Med; Mid 50%; Very small OM1; OM2 - Med to large; ostial/proximal 50%    RCA: Prox/ Mid - Med to Large ; Distal - Med; 70%;  PDA  and PLB - small to med; MLI    LVEDP: Valve not crossed    LVEF: Not assessed    No significant gradient across aortic valve.     PCI: JL3.5 Nowak guide  iFR  LAD - significant - 0.77      Specimens Removed : None    Devices implanted : None    Complications: None    Closure Device: R Radial - TR band    Signed by: Krystle Berry MD on 2/28/2023  1:20 PM       EKG Results       Procedure 720 Value Units Date/Time    AMB POC EKG ROUTINE W/ 12 LEADS, INTER & REP [[id number]] Resulted: 04/17/23 0902    Order Status: Completed Updated: 04/17/23 0904                 Future Appointments   Date Time Provider Himanshu Peoples   4/19/2023  8:00 AM [facility]   4/19/2023  9:00 AM [facility]   4/24/2023  9:00 AM [facility]   4/26/2023  9:00 AM [facility]   5/9/2023  9:30 AM [facility]   5/25/2023  2:30 PM TOVA MCKINNEY [facility] BS AMB   7/10/2023  2:40 PM Carlotta Crenshaw MD [facility] BS AMB         Luz Elena Camargo, [facility] [place] [address], [address] [place] [address]    Ph: [phone]

## 2023-04-19 ENCOUNTER — HOSPITAL ENCOUNTER (OUTPATIENT)
Dept: CARDIAC REHAB | Age: 78
Discharge: HOME | End: 2023-04-19
Payer: MEDICARE

## 2023-04-19 VITALS — BODY MASS INDEX: 31.55 KG/M2 | WEIGHT: 226.2 LBS

## 2023-04-19 PROCEDURE — 93797 PHYS/QHP OP CAR RHAB WO ECG: CPT

## 2023-04-19 PROCEDURE — 93798 PHYS/QHP OP CAR RHAB W/ECG: CPT

## 2023-04-24 ENCOUNTER — HOSPITAL ENCOUNTER (OUTPATIENT)
Dept: CARDIAC REHAB | Age: 78
Discharge: HOME | End: 2023-04-24
Payer: MEDICARE

## 2023-04-24 VITALS — WEIGHT: 226.6 LBS | BODY MASS INDEX: 31.6 KG/M2

## 2023-04-24 PROCEDURE — 93798 PHYS/QHP OP CAR RHAB W/ECG: CPT

## 2023-04-26 ENCOUNTER — HOSPITAL ENCOUNTER (OUTPATIENT)
Dept: CARDIAC REHAB | Age: 78
Discharge: HOME | End: 2023-04-26
Payer: MEDICARE

## 2023-04-26 VITALS — BODY MASS INDEX: 31.74 KG/M2 | WEIGHT: 227.6 LBS

## 2023-04-26 PROCEDURE — 93798 PHYS/QHP OP CAR RHAB W/ECG: CPT

## 2023-05-01 ENCOUNTER — APPOINTMENT (OUTPATIENT)
Facility: HOSPITAL | Age: 78
End: 2023-05-01
Payer: MEDICARE

## 2023-05-01 ENCOUNTER — HOSPITAL ENCOUNTER (OUTPATIENT)
Dept: CARDIAC REHAB | Age: 78
Discharge: HOME | End: 2023-05-01
Payer: MEDICARE

## 2023-05-01 VITALS — BODY MASS INDEX: 31.35 KG/M2 | WEIGHT: 224.8 LBS

## 2023-05-01 PROCEDURE — 9990 CHARGE CONVERSION

## 2023-05-01 PROCEDURE — 93798 PHYS/QHP OP CAR RHAB W/ECG: CPT

## 2023-05-03 ENCOUNTER — HOSPITAL ENCOUNTER (OUTPATIENT)
Dept: CARDIAC REHAB | Age: 78
Discharge: HOME | End: 2023-05-03
Payer: MEDICARE

## 2023-05-03 VITALS — WEIGHT: 226.2 LBS | BODY MASS INDEX: 31.55 KG/M2

## 2023-05-03 PROCEDURE — 9990 CHARGE CONVERSION

## 2023-05-03 PROCEDURE — 93798 PHYS/QHP OP CAR RHAB W/ECG: CPT

## 2023-05-08 ENCOUNTER — HOSPITAL ENCOUNTER (OUTPATIENT)
Facility: HOSPITAL | Age: 78
Setting detail: RECURRING SERIES
Discharge: HOME | End: 2023-05-11
Payer: MEDICARE

## 2023-05-08 VITALS — WEIGHT: 224.8 LBS | BODY MASS INDEX: 31.35 KG/M2

## 2023-05-08 PROCEDURE — 93798 PHYS/QHP OP CAR RHAB W/ECG: CPT

## 2023-05-08 RX ORDER — EZETIMIBE 10 MG/1
10 TABLET ORAL DAILY
COMMUNITY

## 2023-05-08 RX ORDER — ASPIRIN 81 MG/1
81 TABLET ORAL DAILY
COMMUNITY

## 2023-05-08 RX ORDER — ROSUVASTATIN CALCIUM 20 MG/1
20 TABLET, COATED ORAL DAILY
COMMUNITY

## 2023-05-08 ASSESSMENT — EXERCISE STRESS TEST
PEAK_RPE: 12
PEAK_HR: 101
PEAK_METS: 2.7

## 2023-05-09 ENCOUNTER — APPOINTMENT (OUTPATIENT)
Dept: CARDIAC REHAB | Age: 78
End: 2023-05-09
Payer: MEDICARE

## 2023-05-09 ENCOUNTER — APPOINTMENT (OUTPATIENT)
Facility: HOSPITAL | Age: 78
End: 2023-05-09
Payer: MEDICARE

## 2023-05-11 DIAGNOSIS — Z95.2 S/P AVR: Primary | ICD-10-CM

## 2023-05-12 ENCOUNTER — HOSPITAL ENCOUNTER (OUTPATIENT)
Facility: HOSPITAL | Age: 78
Setting detail: RECURRING SERIES
Discharge: HOME | End: 2023-05-15
Payer: MEDICARE

## 2023-05-12 VITALS — WEIGHT: 223.8 LBS | BODY MASS INDEX: 31.21 KG/M2

## 2023-05-12 PROCEDURE — 93798 PHYS/QHP OP CAR RHAB W/ECG: CPT

## 2023-05-12 ASSESSMENT — EXERCISE STRESS TEST
PEAK_HR: 103
PEAK_METS: 2.8
PEAK_RPE: 12

## 2023-05-15 ENCOUNTER — HOSPITAL ENCOUNTER (OUTPATIENT)
Facility: HOSPITAL | Age: 78
Setting detail: RECURRING SERIES
Discharge: HOME | End: 2023-05-18
Payer: MEDICARE

## 2023-05-15 VITALS — WEIGHT: 223.8 LBS | BODY MASS INDEX: 31.21 KG/M2

## 2023-05-15 PROCEDURE — 93798 PHYS/QHP OP CAR RHAB W/ECG: CPT

## 2023-05-15 ASSESSMENT — EXERCISE STRESS TEST
PEAK_METS: 2.8
PEAK_RPE: 12
PEAK_HR: 99

## 2023-05-17 ENCOUNTER — HOSPITAL ENCOUNTER (OUTPATIENT)
Facility: HOSPITAL | Age: 78
Setting detail: RECURRING SERIES
Discharge: HOME | End: 2023-05-20
Payer: MEDICARE

## 2023-05-17 VITALS — BODY MASS INDEX: 31.13 KG/M2 | WEIGHT: 223.2 LBS

## 2023-05-17 PROCEDURE — 93798 PHYS/QHP OP CAR RHAB W/ECG: CPT

## 2023-05-17 ASSESSMENT — EXERCISE STRESS TEST
PEAK_METS: 3.3
PEAK_RPE: 12
PEAK_HR: 101

## 2023-05-19 ENCOUNTER — HOSPITAL ENCOUNTER (OUTPATIENT)
Facility: HOSPITAL | Age: 78
Setting detail: RECURRING SERIES
Discharge: HOME | End: 2023-05-22
Payer: MEDICARE

## 2023-05-19 VITALS — WEIGHT: 225.2 LBS | BODY MASS INDEX: 31.41 KG/M2

## 2023-05-19 PROCEDURE — 93798 PHYS/QHP OP CAR RHAB W/ECG: CPT

## 2023-05-19 ASSESSMENT — EXERCISE STRESS TEST
PEAK_RPE: 12
PEAK_HR: 100
PEAK_METS: 3.3

## 2023-05-22 ENCOUNTER — HOSPITAL ENCOUNTER (OUTPATIENT)
Facility: HOSPITAL | Age: 78
Setting detail: RECURRING SERIES
Discharge: HOME | End: 2023-05-25
Payer: MEDICARE

## 2023-05-22 VITALS — WEIGHT: 224 LBS | BODY MASS INDEX: 31.24 KG/M2

## 2023-05-22 PROCEDURE — 93798 PHYS/QHP OP CAR RHAB W/ECG: CPT

## 2023-05-22 ASSESSMENT — EXERCISE STRESS TEST
PEAK_RPE: 12
PEAK_METS: 3.3
PEAK_HR: 103

## 2023-05-24 ENCOUNTER — HOSPITAL ENCOUNTER (OUTPATIENT)
Facility: HOSPITAL | Age: 78
Setting detail: RECURRING SERIES
Discharge: HOME | End: 2023-05-27
Payer: MEDICARE

## 2023-05-24 VITALS — BODY MASS INDEX: 31.3 KG/M2 | WEIGHT: 224.4 LBS

## 2023-05-24 PROCEDURE — 93798 PHYS/QHP OP CAR RHAB W/ECG: CPT

## 2023-05-24 ASSESSMENT — EXERCISE STRESS TEST
PEAK_HR: 103
PEAK_METS: 3.3
PEAK_RPE: 12

## 2023-05-25 ENCOUNTER — ANCILLARY PROCEDURE (OUTPATIENT)
Age: 78
End: 2023-05-25
Payer: MEDICARE

## 2023-05-25 VITALS
DIASTOLIC BLOOD PRESSURE: 78 MMHG | SYSTOLIC BLOOD PRESSURE: 124 MMHG | WEIGHT: 224 LBS | BODY MASS INDEX: 31.36 KG/M2 | HEIGHT: 71 IN

## 2023-05-25 DIAGNOSIS — Z95.2 S/P AVR: ICD-10-CM

## 2023-05-25 LAB
ECHO AO ASC DIAM: 3.3 CM
ECHO AO ASCENDING AORTA INDEX: 1.49 CM/M2
ECHO AO ROOT DIAM: 2.8 CM
ECHO AO ROOT INDEX: 1.27 CM/M2
ECHO AV AREA PEAK VELOCITY: 1.2 CM2
ECHO AV AREA VTI: 1.5 CM2
ECHO AV AREA/BSA PEAK VELOCITY: 0.5 CM2/M2
ECHO AV AREA/BSA VTI: 0.7 CM2/M2
ECHO AV MEAN GRADIENT: 19 MMHG
ECHO AV MEAN VELOCITY: 2.1 M/S
ECHO AV PEAK GRADIENT: 36 MMHG
ECHO AV PEAK VELOCITY: 3 M/S
ECHO AV VELOCITY RATIO: 0.4
ECHO AV VTI: 53.3 CM
ECHO BSA: 2.26 M2
ECHO EST RA PRESSURE: 3 MMHG
ECHO LA DIAMETER INDEX: 1.81 CM/M2
ECHO LA DIAMETER: 4 CM
ECHO LA TO AORTIC ROOT RATIO: 1.43
ECHO LA VOL 2C: 45 ML (ref 18–58)
ECHO LA VOL 4C: 92 ML (ref 18–58)
ECHO LA VOL BP: 64 ML (ref 18–58)
ECHO LA VOL/BSA BIPLANE: 29 ML/M2 (ref 16–34)
ECHO LA VOLUME AREA LENGTH: 68 ML
ECHO LA VOLUME INDEX A2C: 20 ML/M2 (ref 16–34)
ECHO LA VOLUME INDEX A4C: 42 ML/M2 (ref 16–34)
ECHO LA VOLUME INDEX AREA LENGTH: 31 ML/M2 (ref 16–34)
ECHO LV E' LATERAL VELOCITY: 9 CM/S
ECHO LV E' SEPTAL VELOCITY: 6 CM/S
ECHO LV EDV A2C: 92 ML
ECHO LV EDV A4C: 145 ML
ECHO LV EDV BP: 121 ML (ref 67–155)
ECHO LV EDV INDEX A4C: 66 ML/M2
ECHO LV EDV INDEX BP: 55 ML/M2
ECHO LV EDV NDEX A2C: 42 ML/M2
ECHO LV EJECTION FRACTION A2C: 36 %
ECHO LV EJECTION FRACTION A4C: 60 %
ECHO LV EJECTION FRACTION BIPLANE: 52 % (ref 55–100)
ECHO LV ESV A2C: 58 ML
ECHO LV ESV A4C: 59 ML
ECHO LV ESV BP: 58 ML (ref 22–58)
ECHO LV ESV INDEX A2C: 26 ML/M2
ECHO LV ESV INDEX A4C: 27 ML/M2
ECHO LV ESV INDEX BP: 26 ML/M2
ECHO LV FRACTIONAL SHORTENING: 26 % (ref 28–44)
ECHO LV INTERNAL DIMENSION DIASTOLE INDEX: 2.08 CM/M2
ECHO LV INTERNAL DIMENSION DIASTOLIC: 4.6 CM (ref 4.2–5.9)
ECHO LV INTERNAL DIMENSION SYSTOLIC INDEX: 1.54 CM/M2
ECHO LV INTERNAL DIMENSION SYSTOLIC: 3.4 CM
ECHO LV IVSD: 1.2 CM (ref 0.6–1)
ECHO LV MASS 2D: 181.2 G (ref 88–224)
ECHO LV MASS INDEX 2D: 82 G/M2 (ref 49–115)
ECHO LV POSTERIOR WALL DIASTOLIC: 1 CM (ref 0.6–1)
ECHO LV RELATIVE WALL THICKNESS RATIO: 0.43
ECHO LVOT AREA: 3.1 CM2
ECHO LVOT AV VTI INDEX: 0.48
ECHO LVOT DIAM: 2 CM
ECHO LVOT MEAN GRADIENT: 3 MMHG
ECHO LVOT PEAK GRADIENT: 5 MMHG
ECHO LVOT PEAK VELOCITY: 1.2 M/S
ECHO LVOT STROKE VOLUME INDEX: 36.1 ML/M2
ECHO LVOT SV: 79.8 ML
ECHO LVOT VTI: 25.4 CM
ECHO MV A VELOCITY: 1.21 M/S
ECHO MV AREA PHT: 4.2 CM2
ECHO MV E DECELERATION TIME (DT): 180.3 MS
ECHO MV E VELOCITY: 0.93 M/S
ECHO MV E/A RATIO: 0.77
ECHO MV E/E' LATERAL: 10.33
ECHO MV E/E' RATIO (AVERAGED): 12.92
ECHO MV E/E' SEPTAL: 15.5
ECHO MV PRESSURE HALF TIME (PHT): 52.3 MS
ECHO RA AREA 4C: 20.5 CM2
ECHO RA END SYSTOLIC VOLUME APICAL 4 CHAMBER INDEX BSA: 24 ML/M2
ECHO RA VOLUME AREA LENGTH APICAL 4 CHAMBER: 59 ML
ECHO RA VOLUME: 54 ML
ECHO RIGHT VENTRICULAR SYSTOLIC PRESSURE (RVSP): 31 MMHG
ECHO RV FREE WALL PEAK S': 6 CM/S
ECHO RV INTERNAL DIMENSION: 4.2 CM
ECHO RV TAPSE: 1.5 CM (ref 1.7–?)
ECHO TV REGURGITANT MAX VELOCITY: 2.66 M/S
ECHO TV REGURGITANT PEAK GRADIENT: 28 MMHG

## 2023-05-25 PROCEDURE — 93306 TTE W/DOPPLER COMPLETE: CPT | Performed by: SPECIALIST

## 2023-05-25 PROCEDURE — 93306 TTE W/DOPPLER COMPLETE: CPT

## 2023-05-26 ENCOUNTER — HOSPITAL ENCOUNTER (OUTPATIENT)
Facility: HOSPITAL | Age: 78
Setting detail: RECURRING SERIES
Discharge: HOME | End: 2023-05-29
Payer: MEDICARE

## 2023-05-26 VITALS — WEIGHT: 225.6 LBS | BODY MASS INDEX: 31.46 KG/M2

## 2023-05-26 PROCEDURE — 93798 PHYS/QHP OP CAR RHAB W/ECG: CPT

## 2023-05-26 ASSESSMENT — EXERCISE STRESS TEST
PEAK_HR: 113
PEAK_METS: 3.3
PEAK_RPE: 12

## 2023-05-26 NOTE — RESULT ENCOUNTER NOTE
Dear Mr. Sharmarel Side,    Your echo results are stable. Please let me know if you have any questions.    Best Regards,  TOM Rivera NP

## 2023-05-31 ENCOUNTER — HOSPITAL ENCOUNTER (OUTPATIENT)
Facility: HOSPITAL | Age: 78
Setting detail: RECURRING SERIES
Discharge: HOME | End: 2023-06-03
Payer: MEDICARE

## 2023-05-31 VITALS — WEIGHT: 228.2 LBS | BODY MASS INDEX: 31.83 KG/M2

## 2023-05-31 PROCEDURE — 93798 PHYS/QHP OP CAR RHAB W/ECG: CPT

## 2023-05-31 ASSESSMENT — EJECTION FRACTION: EF_VALUE: 55

## 2023-05-31 ASSESSMENT — LIFESTYLE VARIABLES: SMOKELESS_TOBACCO: NO

## 2023-05-31 ASSESSMENT — EXERCISE STRESS TEST
PEAK_RPE: 12
PEAK_HR: 108
PEAK_METS: 3.3

## 2023-06-02 ENCOUNTER — HOSPITAL ENCOUNTER (OUTPATIENT)
Facility: HOSPITAL | Age: 78
Setting detail: RECURRING SERIES
Discharge: HOME | End: 2023-06-05
Payer: MEDICARE

## 2023-06-02 VITALS — BODY MASS INDEX: 31.55 KG/M2 | WEIGHT: 226.2 LBS

## 2023-06-02 PROCEDURE — 93798 PHYS/QHP OP CAR RHAB W/ECG: CPT

## 2023-06-02 ASSESSMENT — EXERCISE STRESS TEST
PEAK_RPE: 12
PEAK_HR: 122
PEAK_METS: 3.3

## 2023-06-05 ENCOUNTER — TELEPHONE (OUTPATIENT)
Age: 78
End: 2023-06-05

## 2023-06-05 ENCOUNTER — HOSPITAL ENCOUNTER (OUTPATIENT)
Facility: HOSPITAL | Age: 78
Setting detail: RECURRING SERIES
Discharge: HOME | End: 2023-06-08
Payer: MEDICARE

## 2023-06-05 VITALS — BODY MASS INDEX: 31.46 KG/M2 | WEIGHT: 225.6 LBS

## 2023-06-05 PROCEDURE — 93798 PHYS/QHP OP CAR RHAB W/ECG: CPT

## 2023-06-05 ASSESSMENT — EXERCISE STRESS TEST
PEAK_HR: 124
PEAK_METS: 3.3
PEAK_RPE: 12-13

## 2023-06-07 ENCOUNTER — HOSPITAL ENCOUNTER (OUTPATIENT)
Facility: HOSPITAL | Age: 78
Setting detail: RECURRING SERIES
Discharge: HOME | End: 2023-06-10
Payer: MEDICARE

## 2023-06-07 VITALS — BODY MASS INDEX: 31.44 KG/M2 | WEIGHT: 225.4 LBS

## 2023-06-07 PROCEDURE — 93798 PHYS/QHP OP CAR RHAB W/ECG: CPT

## 2023-06-07 PROCEDURE — 93797 PHYS/QHP OP CAR RHAB WO ECG: CPT

## 2023-06-07 ASSESSMENT — EXERCISE STRESS TEST
PEAK_RPE: 12-13
PEAK_HR: 113
PEAK_METS: 3.3

## 2023-06-16 ENCOUNTER — HOSPITAL ENCOUNTER (OUTPATIENT)
Facility: HOSPITAL | Age: 78
Setting detail: RECURRING SERIES
Discharge: HOME | End: 2023-06-19
Payer: MEDICARE

## 2023-06-16 VITALS — BODY MASS INDEX: 31.58 KG/M2 | WEIGHT: 226.4 LBS

## 2023-06-16 PROCEDURE — 93798 PHYS/QHP OP CAR RHAB W/ECG: CPT

## 2023-06-16 ASSESSMENT — EXERCISE STRESS TEST
PEAK_HR: 115
PEAK_RPE: 12-13
PEAK_METS: 3.9

## 2023-06-19 ENCOUNTER — HOSPITAL ENCOUNTER (OUTPATIENT)
Facility: HOSPITAL | Age: 78
Setting detail: RECURRING SERIES
Discharge: HOME | End: 2023-06-22
Payer: MEDICARE

## 2023-06-19 VITALS — BODY MASS INDEX: 31.33 KG/M2 | WEIGHT: 224.6 LBS

## 2023-06-19 PROCEDURE — 93798 PHYS/QHP OP CAR RHAB W/ECG: CPT

## 2023-06-19 ASSESSMENT — EXERCISE STRESS TEST
PEAK_RPE: 12-13
PEAK_HR: 122
PEAK_METS: 3.9

## 2023-06-21 ENCOUNTER — HOSPITAL ENCOUNTER (OUTPATIENT)
Facility: HOSPITAL | Age: 78
Setting detail: RECURRING SERIES
Discharge: HOME | End: 2023-06-24
Payer: MEDICARE

## 2023-06-21 VITALS — WEIGHT: 224.8 LBS | BODY MASS INDEX: 31.35 KG/M2

## 2023-06-21 PROCEDURE — 93798 PHYS/QHP OP CAR RHAB W/ECG: CPT

## 2023-06-21 ASSESSMENT — EXERCISE STRESS TEST
PEAK_METS: 3.9
PEAK_HR: 120
PEAK_RPE: 12-13

## 2023-06-23 ENCOUNTER — HOSPITAL ENCOUNTER (OUTPATIENT)
Facility: HOSPITAL | Age: 78
Setting detail: RECURRING SERIES
Discharge: HOME | End: 2023-06-26
Payer: MEDICARE

## 2023-06-23 VITALS — WEIGHT: 224 LBS | BODY MASS INDEX: 31.24 KG/M2

## 2023-06-23 PROCEDURE — 93798 PHYS/QHP OP CAR RHAB W/ECG: CPT

## 2023-06-23 ASSESSMENT — EXERCISE STRESS TEST
PEAK_RPE: 12-13
PEAK_HR: 107
PEAK_METS: 3.9

## 2023-06-29 ENCOUNTER — HOSPITAL ENCOUNTER (OUTPATIENT)
Facility: HOSPITAL | Age: 78
Setting detail: RECURRING SERIES
End: 2023-06-29
Payer: MEDICARE

## 2023-06-29 VITALS — WEIGHT: 227.4 LBS | BODY MASS INDEX: 31.72 KG/M2

## 2023-06-29 PROCEDURE — 93797 PHYS/QHP OP CAR RHAB WO ECG: CPT

## 2023-06-29 PROCEDURE — 93798 PHYS/QHP OP CAR RHAB W/ECG: CPT

## 2023-06-29 ASSESSMENT — EXERCISE STRESS TEST
PEAK_RPE: 12-13
PEAK_HR: 110
PEAK_BP: 150/72
PEAK_RPE: 12
PEAK_HR: 104
PEAK_METS: 3.9

## 2023-06-29 ASSESSMENT — LIFESTYLE VARIABLES: SMOKELESS_TOBACCO: NO

## 2023-06-29 ASSESSMENT — EJECTION FRACTION: EF_VALUE: 55

## 2023-07-06 ENCOUNTER — APPOINTMENT (OUTPATIENT)
Facility: HOSPITAL | Age: 78
End: 2023-07-06
Payer: MEDICARE

## 2023-07-06 ENCOUNTER — HOSPITAL ENCOUNTER (OUTPATIENT)
Facility: HOSPITAL | Age: 78
Setting detail: RECURRING SERIES
Discharge: HOME | End: 2023-07-09
Payer: MEDICARE

## 2023-07-06 VITALS — BODY MASS INDEX: 31.86 KG/M2 | WEIGHT: 228.4 LBS

## 2023-07-06 PROCEDURE — 93798 PHYS/QHP OP CAR RHAB W/ECG: CPT

## 2023-07-06 ASSESSMENT — EXERCISE STRESS TEST
PEAK_METS: 3.9
PEAK_RPE: 12-13
PEAK_HR: 117

## 2023-07-09 RX ORDER — METOPROLOL SUCCINATE 25 MG/1
TABLET, EXTENDED RELEASE ORAL
Qty: 15 TABLET | OUTPATIENT
Start: 2023-07-09

## 2023-07-10 ENCOUNTER — OFFICE VISIT (OUTPATIENT)
Age: 78
End: 2023-07-10
Payer: MEDICARE

## 2023-07-10 VITALS
WEIGHT: 228 LBS | BODY MASS INDEX: 31.8 KG/M2 | DIASTOLIC BLOOD PRESSURE: 56 MMHG | RESPIRATION RATE: 16 BRPM | SYSTOLIC BLOOD PRESSURE: 116 MMHG | OXYGEN SATURATION: 96 % | HEART RATE: 74 BPM

## 2023-07-10 DIAGNOSIS — I50.32 CHRONIC DIASTOLIC (CONGESTIVE) HEART FAILURE (HCC): ICD-10-CM

## 2023-07-10 DIAGNOSIS — I25.10 ATHEROSCLEROSIS OF NATIVE CORONARY ARTERY OF NATIVE HEART WITHOUT ANGINA PECTORIS: Primary | ICD-10-CM

## 2023-07-10 DIAGNOSIS — I10 ESSENTIAL (PRIMARY) HYPERTENSION: ICD-10-CM

## 2023-07-10 DIAGNOSIS — Z95.1 HX OF CABG: ICD-10-CM

## 2023-07-10 DIAGNOSIS — Z95.2 S/P AVR: ICD-10-CM

## 2023-07-10 DIAGNOSIS — E78.2 MIXED HYPERLIPIDEMIA: ICD-10-CM

## 2023-07-10 PROCEDURE — 99214 OFFICE O/P EST MOD 30 MIN: CPT | Performed by: INTERNAL MEDICINE

## 2023-07-10 RX ORDER — EZETIMIBE 10 MG/1
10 TABLET ORAL DAILY
Qty: 90 TABLET | Refills: 3 | Status: SHIPPED | OUTPATIENT
Start: 2023-07-10

## 2023-07-10 RX ORDER — ROSUVASTATIN CALCIUM 20 MG/1
20 TABLET, COATED ORAL DAILY
Qty: 90 TABLET | Refills: 3 | Status: SHIPPED | OUTPATIENT
Start: 2023-07-10

## 2023-07-10 RX ORDER — ASPIRIN 81 MG/1
81 TABLET ORAL DAILY
Qty: 90 TABLET | Refills: 3 | Status: SHIPPED | OUTPATIENT
Start: 2023-07-10

## 2023-07-10 ASSESSMENT — PATIENT HEALTH QUESTIONNAIRE - PHQ9
2. FEELING DOWN, DEPRESSED OR HOPELESS: 0
SUM OF ALL RESPONSES TO PHQ9 QUESTIONS 1 & 2: 0
SUM OF ALL RESPONSES TO PHQ QUESTIONS 1-9: 0
SUM OF ALL RESPONSES TO PHQ QUESTIONS 1-9: 0
1. LITTLE INTEREST OR PLEASURE IN DOING THINGS: 0
SUM OF ALL RESPONSES TO PHQ QUESTIONS 1-9: 0
SUM OF ALL RESPONSES TO PHQ QUESTIONS 1-9: 0

## 2023-07-10 NOTE — PROGRESS NOTES
Chief Complaint   Patient presents with    Atrial Fibrillation    Hyperlipidemia    Hypertension    Coronary Artery Disease     Vitals:    07/10/23 1503   BP: (!) 116/56   Site: Left Upper Arm   Position: Sitting   Cuff Size: Large Adult   Pulse: 74   Resp: 16   SpO2: 96%   Weight: 228 lb (103.4 kg)     Chest pain denied   SOB denied   Palpitations denied   Swelling in hands/feet denied   Dizziness denied   Recent hospital stays denied   Refills denied  yes on all medication

## 2023-07-18 ENCOUNTER — HOSPITAL ENCOUNTER (OUTPATIENT)
Facility: HOSPITAL | Age: 78
Setting detail: RECURRING SERIES
Discharge: HOME | End: 2023-07-21
Payer: MEDICARE

## 2023-07-18 VITALS — WEIGHT: 224.5 LBS | BODY MASS INDEX: 31.31 KG/M2

## 2023-07-18 PROCEDURE — 93798 PHYS/QHP OP CAR RHAB W/ECG: CPT

## 2023-07-18 ASSESSMENT — EXERCISE STRESS TEST
PEAK_RPE: 12-13
PEAK_HR: 103
PEAK_METS: 3.9

## 2023-07-20 ENCOUNTER — HOSPITAL ENCOUNTER (OUTPATIENT)
Facility: HOSPITAL | Age: 78
Setting detail: RECURRING SERIES
Discharge: HOME | End: 2023-07-23
Payer: MEDICARE

## 2023-07-20 VITALS — WEIGHT: 226.6 LBS | BODY MASS INDEX: 31.6 KG/M2

## 2023-07-20 PROCEDURE — 93798 PHYS/QHP OP CAR RHAB W/ECG: CPT

## 2023-07-20 ASSESSMENT — PATIENT HEALTH QUESTIONNAIRE - PHQ9
10. IF YOU CHECKED OFF ANY PROBLEMS, HOW DIFFICULT HAVE THESE PROBLEMS MADE IT FOR YOU TO DO YOUR WORK, TAKE CARE OF THINGS AT HOME, OR GET ALONG WITH OTHER PEOPLE: 0
1. LITTLE INTEREST OR PLEASURE IN DOING THINGS: 0
4. FEELING TIRED OR HAVING LITTLE ENERGY: 0
SUM OF ALL RESPONSES TO PHQ9 QUESTIONS 1 & 2: 0
3. TROUBLE FALLING OR STAYING ASLEEP: 0
SUM OF ALL RESPONSES TO PHQ QUESTIONS 1-9: 0
5. POOR APPETITE OR OVEREATING: 0
SUM OF ALL RESPONSES TO PHQ QUESTIONS 1-9: 0
6. FEELING BAD ABOUT YOURSELF - OR THAT YOU ARE A FAILURE OR HAVE LET YOURSELF OR YOUR FAMILY DOWN: 0
SUM OF ALL RESPONSES TO PHQ QUESTIONS 1-9: 0
9. THOUGHTS THAT YOU WOULD BE BETTER OFF DEAD, OR OF HURTING YOURSELF: 0
7. TROUBLE CONCENTRATING ON THINGS, SUCH AS READING THE NEWSPAPER OR WATCHING TELEVISION: 0
8. MOVING OR SPEAKING SO SLOWLY THAT OTHER PEOPLE COULD HAVE NOTICED. OR THE OPPOSITE, BEING SO FIGETY OR RESTLESS THAT YOU HAVE BEEN MOVING AROUND A LOT MORE THAN USUAL: 0
SUM OF ALL RESPONSES TO PHQ QUESTIONS 1-9: 0
2. FEELING DOWN, DEPRESSED OR HOPELESS: 0

## 2023-07-20 ASSESSMENT — EXERCISE STRESS TEST
PEAK_HR: 105
PEAK_RPE: 12-13
PEAK_METS: 3.9

## 2023-07-21 ASSESSMENT — EXERCISE STRESS TEST: PEAK_BP: 150/72

## 2023-07-21 ASSESSMENT — LIFESTYLE VARIABLES: SMOKELESS_TOBACCO: NO

## 2023-07-21 ASSESSMENT — EJECTION FRACTION: EF_VALUE: 55

## 2023-07-21 NOTE — CARDIO/PULMONARY
DISCHARGE SUMMARY NOTE  2023      NAME: Sherley Carias : 1945 AGE: 76 y.o. GENDER: male    CARDIAC REHAB ADMITTING DIAGNOSIS: Presence of other heart-valve replacement [Z95.4]    REFERRING PHYSICIAN: Zen Ortiz, *    MEDICAL HX:  Past Medical History:   Diagnosis Date    Arrhythmia     CAD (coronary artery disease)     Hypercholesterolemia     Hypertension        LABS:     No results found for: HBA1C, [id number]  Lab Results   Component Value Date/Time    CHOL 110 2023 02:28 AM    HDL 52 2023 02:28 AM         ANTHROPOMETRICS:      Ht Readings from Last 1 Encounters:   23 1.803 m (5' 11\")      Wt Readings from Last 1 Encounters:   23 102.8 kg (226 lb 9.6 oz)        WAIST: 46 inches         PROGRAM SUMMARY:    Sherley Carias completed 36/36 sessions in the Cardiac Rehab program. He will continue exercising 3-5 x week and focus on diet and nutrition at home. He attended 3 classes and is aware of his/her cardiac risk factors and cardiac medications. Weight loss was 2 lbs. MET level increase from 2.6 to 3.9 on Treadmill. Questions addressed. Discharge plans discussed. Sherley Carias verbalized understanding.       Jules Brooks, RN, RN

## 2023-07-27 ENCOUNTER — APPOINTMENT (OUTPATIENT)
Facility: HOSPITAL | Age: 78
End: 2023-07-27
Payer: MEDICARE

## 2023-09-12 ENCOUNTER — TELEPHONE (OUTPATIENT)
Age: 78
End: 2023-09-12

## 2023-09-12 NOTE — TELEPHONE ENCOUNTER
Avel Rizo from [facility] would like to know if the pt requires a pre-med for a dental cleaning and crown. Pt has a scheduled appt on 10/13/23.     [facility]/Dr. Doc Bryan  Ph [phone]  Fax [phone]

## 2024-01-11 ENCOUNTER — OFFICE VISIT (OUTPATIENT)
Age: 79
End: 2024-01-11
Payer: MEDICARE

## 2024-01-11 VITALS
HEIGHT: 71 IN | HEART RATE: 69 BPM | RESPIRATION RATE: 18 BRPM | SYSTOLIC BLOOD PRESSURE: 130 MMHG | OXYGEN SATURATION: 97 % | DIASTOLIC BLOOD PRESSURE: 70 MMHG | BODY MASS INDEX: 31.14 KG/M2 | WEIGHT: 222.4 LBS

## 2024-01-11 DIAGNOSIS — E78.2 MIXED HYPERLIPIDEMIA: ICD-10-CM

## 2024-01-11 DIAGNOSIS — Z95.1 HX OF CABG: ICD-10-CM

## 2024-01-11 DIAGNOSIS — Z95.2 S/P AVR: ICD-10-CM

## 2024-01-11 DIAGNOSIS — I25.10 CORONARY ARTERY DISEASE INVOLVING NATIVE CORONARY ARTERY OF NATIVE HEART WITHOUT ANGINA PECTORIS: Primary | ICD-10-CM

## 2024-01-11 DIAGNOSIS — I10 ESSENTIAL (PRIMARY) HYPERTENSION: ICD-10-CM

## 2024-01-11 PROCEDURE — 99214 OFFICE O/P EST MOD 30 MIN: CPT | Performed by: INTERNAL MEDICINE

## 2024-01-11 PROCEDURE — 3075F SYST BP GE 130 - 139MM HG: CPT | Performed by: INTERNAL MEDICINE

## 2024-01-11 PROCEDURE — 1123F ACP DISCUSS/DSCN MKR DOCD: CPT | Performed by: INTERNAL MEDICINE

## 2024-01-11 PROCEDURE — 3078F DIAST BP <80 MM HG: CPT | Performed by: INTERNAL MEDICINE

## 2024-01-11 NOTE — PROGRESS NOTES
Chief Complaint   Patient presents with    Coronary Artery Disease    Hypertension    Hyperlipidemia    Follow-up         Chest Pain  No    Palpitations  No    SOB  No    Dizziness  No    Swelling  No    Last Lipids   Lab Results   Component Value Date    CHOL 110 02/25/2023    TRIG 69 02/25/2023    HDL 52 02/25/2023    LDLCALC 44.2 02/25/2023    LABVLDL 13.8 02/25/2023    CHOLHDLRATIO 2.1 02/25/2023        Refills    /70 (Site: Left Upper Arm, Position: Sitting, Cuff Size: Large Adult)   Pulse 69   Resp 18   Ht 1.803 m (5' 11\")   Wt 100.9 kg (222 lb 6.4 oz)   SpO2 97%   BMI 31.02 kg/m²       Have you been to the ER, urgent care clinic since your last visit? No  Hospitalized since your last visit? NO    2. Have you seen or consulted with any other health care providers outside of the [facility] since your last visit?  No    
JL3.5 Boyle guide  iFR  LAD - significant - 0.77        Specimens Removed : None     Devices implanted : None     Complications: None     Closure Device: R Radial - TR band     Rec; CTS consult           B.ECHO/RENE: 5/23/23   Left Ventricle: Normal left ventricular systolic function with a visually estimated EF of 55 - 60%. Left ventricle size is normal. Mild septal thickening. Normal wall motion. Abnormal diastolic function.    Right Ventricle: Right ventricle is mildly dilated. Mildly reduced systolic function. TAPSE is abnormal. TDI systolic excursion is abnormal.    Aortic Valve: Bioprosthetic valve that is well-seated. AV mean gradient is 19 mmHg. AV peak gradient is 36 mmHg. AV peak velocity is 3.0 m/s. LVOT diameter is 2.0 cm. AV area by continuity VTI is 1.5 cm2.    Mitral Valve: Mildly thickened leaflet. Mild annular calcification of the mitral valve.    Tricuspid Valve: Mild regurgitation. Normal RVSP. The estimated RVSP is 31 mmHg.    Left Atrium: Left atrium is moderately dilated.    C.StressNuclear/Stress ECHO/Stress test:    D.Vascular: 2/28/23 Nml GREG bilat;   3/1/23 - Carotid Doppler 0-49% bilat    E. EP:    F. Miscellaneous: 3/3/23 Dr Ortiz  CABG x 3.              Left internal mammary artery to LAD.              Reverse saphenous vein graft to R-PDA.              Reverse saphenous vein graft to OM.   Tissue AVR with #21 CE Inspiris.    History:     Julio C Gotti is a 78 y.o. male who returns for follow up visit.    No CP/dyspnea/swelling LE/palpitaitons    Lives at [facility].      ROS:  (bold if positive, if negative)           Medications:       Current Outpatient Medications   Medication Instructions    aspirin 81 mg, Oral, DAILY    ezetimibe (ZETIA) 10 mg, Oral, DAILY    metoprolol tartrate (LOPRESSOR) 12.5 mg, Oral, DAILY    rosuvastatin (CRESTOR) 20 mg, Oral, DAILY    Semaglutide (OZEMPIC, 0.25 OR 0.5 MG/DOSE, SC) SubCUTAneous            Family History of CAD:

## 2024-01-19 LAB — HBA1C MFR BLD HPLC: 5.6 %

## 2024-03-08 ENCOUNTER — TELEPHONE (OUTPATIENT)
Dept: PHARMACY | Facility: CLINIC | Age: 79
End: 2024-03-08

## 2024-03-08 NOTE — TELEPHONE ENCOUNTER
[facility] CLINICAL PHARMACY: ADHERENCE REVIEW  Identified care gap per [employer]: fills at [facility]: Diabetes adherence    Patient also appears to be prescribed: Statin    ASSESSMENT    DIABETES ADHERENCE    Insurance Records claims through  24  (Prior Year PDC = not reported; YTD PDC = FIRST FILL; Potential Fail Date: 24):   OZEMPIC INJ 2MG/3ML last filled on 24 for 28 day supply. Next refill due: 24 -PAST DUE 18 DAYS     Prescribed sig:  ADMINISTER 0.25 ONCE A WEEK SUBCUTANEOUSLY FOR 6 WEEKS    Per Insurer Portal: last filled on 24 for 28 day supply.     Per [facility]: last picked up on 24 for 28 day supply. Billed through [employer]. 1 refills remaining. Prior auth is needed. [facility] submitted PA to provider.     Lab Results   Component Value Date    LABA1C 5.6 2023       STATIN ADHERENCE    Insurance Records claims through  24  (Prior Year PDC = not reported; YTD PDC = FIRST FILL; Potential Fail Date: 24):   ROSUVASTATIN TAB 20 MG last filled on 24 for 90 day supply. Next refill due: 24    Prescribed si tablet/capsule daily    Per Insurer Portal: last filled on 24 for 90 day supply.     Per [facility]: last picked up on 24 for 90 day supply. Billed through [employer]. 1 refills remaining.    Lab Results   Component Value Date    CHOL 110 2023    TRIG 69 2023    HDL 52 2023    LDLCALC 44.2 2023     ALT   Date Value Ref Range Status   2023 22 12 - 78 U/L Final     AST   Date Value Ref Range Status   2023 44 (H) 15 - 37 U/L Final     The ASCVD Risk score (Livia DK, et al., 2019) failed to calculate for the following reasons:    The 2019 ASCVD risk score is only valid for ages 40 to 79    The patient has a prior MI or stroke diagnosis     PLAN    Per insurer report, LIS-0 - co-pays are based on tiers and patient is subject to coverage gap.    The following are interventions that have been identified:

## 2024-03-13 NOTE — TELEPHONE ENCOUNTER
Called to check on outcome of Prior auth 3/22/2024  Jordana was on other line- gave # for her to call back  If she calls back and I am not available- please document or ask- She was submitting information for Ozempic Prior Authorization, and if it would be approved.   Ask- did she call the pharmacy with outcome?  Did she call the patient with outcome?  If she did not call either- ask if when we call the patient if we want to tell him to make a follow up appt with Dr. Mercado    Called PCP 3/20/2024 office to check on status  BLAS MERCADO MD   [address] [place] [id number] [phone]     Dx code is for weight loss not DMT2  [email or web address] claim was created 3/19/24-   Jordana will put through today I will check back tomorrow 3/22    Faxed 3/13/2024 notice of Prior Authorization needed to   BLAS Mercado MD   [address] [place] [address] / [facility] [id number]   Fax [phone]       Eva Mcgrath  PharmD, BCACP  Population Health Pharmacist  [facility]  Department, toll free: [phone], option 2

## 2024-03-13 NOTE — TELEPHONE ENCOUNTER
Followed up on adherence for OZEMPIC INJ 2MG/3 ML.     Contacted [facility] and OZEMPIC INJ 2MG/3 ML still needs a prior auth.     Routing to pharmacist.

## 2024-03-22 NOTE — TELEPHONE ENCOUNTER
Jordana called back regarding a prior auth for Ozempic.    Jordana states the PA was denied because the patient is not diabetic. They informed their patient's at the beginning of the year that insurance companies weren't going to cover the GLP-1 injections without a diabetes diagnosis. She informed the patient through e-mail that the prior auth was denied.     Lucita Garcia [facility]  Population Health    [facility]   [phone] option 2     For Pharmacy Admin Tracking Only    Program: [employer]  CPA in place:  No  Gap Closed?: No   Time Spent (min): 30

## 2024-06-29 DIAGNOSIS — Z95.1 HX OF CABG: ICD-10-CM

## 2024-06-29 DIAGNOSIS — I25.10 ATHEROSCLEROSIS OF NATIVE CORONARY ARTERY OF NATIVE HEART WITHOUT ANGINA PECTORIS: ICD-10-CM

## 2024-06-29 DIAGNOSIS — E78.2 MIXED HYPERLIPIDEMIA: ICD-10-CM

## 2024-06-29 DIAGNOSIS — I50.32 CHRONIC DIASTOLIC (CONGESTIVE) HEART FAILURE (HCC): ICD-10-CM

## 2024-06-29 DIAGNOSIS — Z95.2 S/P AVR: ICD-10-CM

## 2024-06-29 DIAGNOSIS — I10 ESSENTIAL (PRIMARY) HYPERTENSION: ICD-10-CM

## 2024-07-01 RX ORDER — ROSUVASTATIN CALCIUM 20 MG/1
20 TABLET, COATED ORAL DAILY
Qty: 90 TABLET | Refills: 3 | Status: SHIPPED | OUTPATIENT
Start: 2024-07-01

## 2024-07-01 RX ORDER — EZETIMIBE 10 MG/1
10 TABLET ORAL DAILY
Qty: 90 TABLET | Refills: 3 | Status: SHIPPED | OUTPATIENT
Start: 2024-07-01

## 2024-07-26 ENCOUNTER — CLINICAL DOCUMENTATION (OUTPATIENT)
Age: 79
End: 2024-07-26

## 2024-07-26 NOTE — PROGRESS NOTES
Records received.  Patient cancelled appointment.  Will send to scanning pending patient rescheduling.

## 2024-08-15 ENCOUNTER — TELEPHONE (OUTPATIENT)
Age: 79
End: 2024-08-15

## 2024-08-15 NOTE — TELEPHONE ENCOUNTER
Patient mailed copy of recent lipid panel for review.  Requesting to discontinue crestor 20mg d/t reading about the \"negative side effects of statin drugs regarding Alzheimer's, diabetes and heart health.\"    1/18/24  Ordering Dr. Mercado    Total Cholesterol 136  TG 81  HDL 54  LDL 66    Will send hard copy to scanning.

## 2024-08-16 NOTE — TELEPHONE ENCOUNTER
Spoke with patient.  Notified patient of provider's recommendation.  Understanding expressed.  Patient states he will consider his options regarding continuing statin medication.

## 2024-09-22 DIAGNOSIS — E78.2 MIXED HYPERLIPIDEMIA: ICD-10-CM

## 2024-09-22 DIAGNOSIS — I10 ESSENTIAL (PRIMARY) HYPERTENSION: ICD-10-CM

## 2024-09-22 DIAGNOSIS — Z95.2 S/P AVR: ICD-10-CM

## 2024-09-22 DIAGNOSIS — I50.32 CHRONIC DIASTOLIC (CONGESTIVE) HEART FAILURE (HCC): ICD-10-CM

## 2024-09-22 DIAGNOSIS — Z95.1 HX OF CABG: ICD-10-CM

## 2024-09-22 DIAGNOSIS — I25.10 ATHEROSCLEROSIS OF NATIVE CORONARY ARTERY OF NATIVE HEART WITHOUT ANGINA PECTORIS: ICD-10-CM

## 2024-09-23 RX ORDER — METOPROLOL TARTRATE 25 MG/1
12.5 TABLET, FILM COATED ORAL DAILY
Qty: 45 TABLET | Refills: 0 | Status: SHIPPED | OUTPATIENT
Start: 2024-09-23

## 2024-10-04 ENCOUNTER — ANCILLARY PROCEDURE (OUTPATIENT)
Age: 79
End: 2024-10-04
Payer: MEDICARE

## 2024-10-04 ENCOUNTER — OFFICE VISIT (OUTPATIENT)
Age: 79
End: 2024-10-04
Payer: MEDICARE

## 2024-10-04 VITALS
SYSTOLIC BLOOD PRESSURE: 170 MMHG | HEIGHT: 71 IN | OXYGEN SATURATION: 99 % | BODY MASS INDEX: 32.76 KG/M2 | DIASTOLIC BLOOD PRESSURE: 98 MMHG | WEIGHT: 234 LBS | HEART RATE: 64 BPM

## 2024-10-04 VITALS
WEIGHT: 222 LBS | DIASTOLIC BLOOD PRESSURE: 98 MMHG | HEIGHT: 71 IN | BODY MASS INDEX: 31.08 KG/M2 | SYSTOLIC BLOOD PRESSURE: 170 MMHG

## 2024-10-04 DIAGNOSIS — E78.2 MIXED HYPERLIPIDEMIA: ICD-10-CM

## 2024-10-04 DIAGNOSIS — I10 ESSENTIAL (PRIMARY) HYPERTENSION: ICD-10-CM

## 2024-10-04 DIAGNOSIS — I25.10 CORONARY ARTERY DISEASE INVOLVING NATIVE CORONARY ARTERY OF NATIVE HEART WITHOUT ANGINA PECTORIS: ICD-10-CM

## 2024-10-04 DIAGNOSIS — Z95.1 HX OF CABG: ICD-10-CM

## 2024-10-04 DIAGNOSIS — Z95.2 S/P AVR: ICD-10-CM

## 2024-10-04 DIAGNOSIS — I25.10 CORONARY ARTERY DISEASE INVOLVING NATIVE CORONARY ARTERY OF NATIVE HEART WITHOUT ANGINA PECTORIS: Primary | ICD-10-CM

## 2024-10-04 PROCEDURE — 99214 OFFICE O/P EST MOD 30 MIN: CPT | Performed by: INTERNAL MEDICINE

## 2024-10-04 PROCEDURE — 93005 ELECTROCARDIOGRAM TRACING: CPT | Performed by: INTERNAL MEDICINE

## 2024-10-04 PROCEDURE — 93010 ELECTROCARDIOGRAM REPORT: CPT | Performed by: INTERNAL MEDICINE

## 2024-10-04 PROCEDURE — 3077F SYST BP >= 140 MM HG: CPT | Performed by: INTERNAL MEDICINE

## 2024-10-04 PROCEDURE — 93306 TTE W/DOPPLER COMPLETE: CPT | Performed by: INTERNAL MEDICINE

## 2024-10-04 PROCEDURE — 1123F ACP DISCUSS/DSCN MKR DOCD: CPT | Performed by: INTERNAL MEDICINE

## 2024-10-04 PROCEDURE — 3080F DIAST BP >= 90 MM HG: CPT | Performed by: INTERNAL MEDICINE

## 2024-10-04 NOTE — PROGRESS NOTES
Bowen Narayanan MD., [facility]    [phone],[facility] [place] [address]    Office [phone],Fax [phone]           Julio C Gotti is here for a f/u office visit.    Primary care physician:  BLAS Mercado MD    CC - as documented in EMR    Dear BLAS Mckeon MD    I had the pleasure of seeing Mr.Ernest ZAID Gotti in the office today.      Assessment:     CAD - [facility] admit 2/24/23 NSTEMI/Aortic stenosis - 3/3/23 - CABG - LIMA to LAD/SVG to RCA/SVG to OM;  Tissue AVR with #21 CE Inspiris.  HTN  HLD  Mod Hiatal hernia on Cxray -has seen PCP        Plan:        1/19/24 -lipids-LDL 66  Scheduled to get it checked by his PCP- physician    Blood pressure elevated in the office today.  However, patient states that home blood pressures are well-controlled.  Continue to monitor and maintain log.    Echocardiogram - EF 55-60%; Bioprosthetic AV mean gradient 36mm Hg ( inc from 19 mm Hg)    Aggressive cardiovascular risk factor modification.    Follow-up 6 months/earlier as needed. ECHO in a year     Patient understands the plan. All questions were answered to the patient's satisfaction.    I appreciate the opportunity to be involved in care. See note below for details. Please do not hesitate to contact us with questions or concerns.    Bowen Narayanan MD      Cardiac Testing/ Procedures:       A.Cardiac Cath/PCI:  2/27/23 Access: R Radial Access - 6 F sheath        Difficulty advancing guide wire radial artery - angiogram; Small vessel/tortuos/lesion ( forearm) vs vasospasm     All catheter exchanges performed over guide wire     Difficult advance  6 F EBU guide for iFR ; Able to advance 5 F barreto 3.5 guide     Catheters: RCA : JR4                    LCA : JL3.5     Findings:      L Main:Long/ Med to Large; MLI;      LAD: Med; Prox diffuse 60-70%; Mid - diffuse 70%      RI - Med; MLI     LCflex: Med; Mid 50%; Very small OM1; OM2 - Med to large;

## 2024-10-04 NOTE — PROGRESS NOTES
had concerns including Coronary Artery Disease and Hypertension.    Vitals:    10/04/24 1324   BP: (!) 170/98   Site: Left Upper Arm   Position: Sitting   Pulse: 64   SpO2: 99%   Weight: 106.1 kg (234 lb)   Height: 1.803 m (5' 11\")        Chest pain No    Refills No        1. Have you been to the ER, urgent care clinic since your last visit? No       Hospitalized since your last visit? No       Where?        Date?

## 2024-10-07 LAB
ECHO AO ASC DIAM: 3.5 CM
ECHO AO ASCENDING AORTA INDEX: 1.59 CM/M2
ECHO AO ROOT DIAM: 2.9 CM
ECHO AO ROOT INDEX: 1.32 CM/M2
ECHO AV AREA PEAK VELOCITY: 1.1 CM2
ECHO AV AREA VTI: 1.3 CM2
ECHO AV AREA/BSA PEAK VELOCITY: 0.5 CM2/M2
ECHO AV AREA/BSA VTI: 0.6 CM2/M2
ECHO AV MEAN GRADIENT: 35 MMHG
ECHO AV MEAN VELOCITY: 2.8 M/S
ECHO AV PEAK GRADIENT: 65 MMHG
ECHO AV PEAK VELOCITY: 4.1 M/S
ECHO AV VELOCITY RATIO: 0.34
ECHO AV VTI: 79.1 CM
ECHO BSA: 2.25 M2
ECHO EST RA PRESSURE: 3 MMHG
ECHO LA DIAMETER INDEX: 1.73 CM/M2
ECHO LA DIAMETER: 3.8 CM
ECHO LA TO AORTIC ROOT RATIO: 1.31
ECHO LA VOL A-L A2C: 40 ML (ref 18–58)
ECHO LA VOL A-L A4C: 109 ML (ref 18–58)
ECHO LA VOL BP: 66 ML (ref 18–58)
ECHO LA VOL MOD A2C: 40 ML (ref 18–58)
ECHO LA VOL MOD A4C: 104 ML (ref 18–58)
ECHO LA VOL/BSA BIPLANE: 30 ML/M2 (ref 16–34)
ECHO LA VOLUME AREA LENGTH: 69 ML
ECHO LA VOLUME INDEX A-L A2C: 18 ML/M2 (ref 16–34)
ECHO LA VOLUME INDEX A-L A4C: 50 ML/M2 (ref 16–34)
ECHO LA VOLUME INDEX AREA LENGTH: 31 ML/M2 (ref 16–34)
ECHO LA VOLUME INDEX MOD A2C: 18 ML/M2 (ref 16–34)
ECHO LA VOLUME INDEX MOD A4C: 47 ML/M2 (ref 16–34)
ECHO LV E' LATERAL VELOCITY: 13.4 CM/S
ECHO LV E' SEPTAL VELOCITY: 6.6 CM/S
ECHO LV EDV A2C: 79 ML
ECHO LV EDV A4C: 124 ML
ECHO LV EDV BP: 101 ML (ref 67–155)
ECHO LV EDV INDEX A4C: 56 ML/M2
ECHO LV EDV INDEX BP: 46 ML/M2
ECHO LV EDV NDEX A2C: 36 ML/M2
ECHO LV EJECTION FRACTION A2C: 69 %
ECHO LV EJECTION FRACTION A4C: 72 %
ECHO LV EJECTION FRACTION BIPLANE: 69 % (ref 55–100)
ECHO LV ESV A2C: 24 ML
ECHO LV ESV A4C: 35 ML
ECHO LV ESV BP: 31 ML (ref 22–58)
ECHO LV ESV INDEX A2C: 11 ML/M2
ECHO LV ESV INDEX A4C: 16 ML/M2
ECHO LV ESV INDEX BP: 14 ML/M2
ECHO LV FRACTIONAL SHORTENING: 24 % (ref 28–44)
ECHO LV INTERNAL DIMENSION DIASTOLE INDEX: 1.91 CM/M2
ECHO LV INTERNAL DIMENSION DIASTOLIC: 4.2 CM (ref 4.2–5.9)
ECHO LV INTERNAL DIMENSION SYSTOLIC INDEX: 1.45 CM/M2
ECHO LV INTERNAL DIMENSION SYSTOLIC: 3.2 CM
ECHO LV IVSD: 1.2 CM (ref 0.6–1)
ECHO LV MASS 2D: 167.4 G (ref 88–224)
ECHO LV MASS INDEX 2D: 76.1 G/M2 (ref 49–115)
ECHO LV POSTERIOR WALL DIASTOLIC: 1.1 CM (ref 0.6–1)
ECHO LV RELATIVE WALL THICKNESS RATIO: 0.52
ECHO LVOT AREA: 3.1 CM2
ECHO LVOT AV VTI INDEX: 0.4
ECHO LVOT DIAM: 2 CM
ECHO LVOT MEAN GRADIENT: 5 MMHG
ECHO LVOT PEAK GRADIENT: 8 MMHG
ECHO LVOT PEAK VELOCITY: 1.4 M/S
ECHO LVOT STROKE VOLUME INDEX: 45.7 ML/M2
ECHO LVOT SV: 100.5 ML
ECHO LVOT VTI: 32 CM
ECHO MV A VELOCITY: 1.14 M/S
ECHO MV AREA PHT: 2.8 CM2
ECHO MV E DECELERATION TIME (DT): 267.1 MS
ECHO MV E VELOCITY: 1 M/S
ECHO MV E/A RATIO: 0.88
ECHO MV E/E' LATERAL: 7.46
ECHO MV E/E' RATIO (AVERAGED): 11.31
ECHO MV E/E' SEPTAL: 15.15
ECHO MV PRESSURE HALF TIME (PHT): 77.5 MS
ECHO RA AREA 4C: 21.3 CM2
ECHO RA END SYSTOLIC VOLUME APICAL 4 CHAMBER INDEX BSA: 27 ML/M2
ECHO RA VOLUME AREA LENGTH APICAL 4 CHAMBER: 58 ML
ECHO RA VOLUME: 59 ML
ECHO RIGHT VENTRICULAR SYSTOLIC PRESSURE (RVSP): 35 MMHG
ECHO RV FREE WALL PEAK S': 9.8 CM/S
ECHO RV INTERNAL DIMENSION: 3.8 CM
ECHO RV TAPSE: 1.1 CM (ref 1.7–?)
ECHO TV REGURGITANT MAX VELOCITY: 2.83 M/S
ECHO TV REGURGITANT PEAK GRADIENT: 32 MMHG

## 2024-10-07 PROCEDURE — 93306 TTE W/DOPPLER COMPLETE: CPT | Performed by: INTERNAL MEDICINE

## 2024-12-18 DIAGNOSIS — Z95.1 HX OF CABG: ICD-10-CM

## 2024-12-18 DIAGNOSIS — I10 ESSENTIAL (PRIMARY) HYPERTENSION: ICD-10-CM

## 2024-12-18 DIAGNOSIS — Z95.2 S/P AVR: ICD-10-CM

## 2024-12-18 DIAGNOSIS — I50.32 CHRONIC DIASTOLIC (CONGESTIVE) HEART FAILURE (HCC): ICD-10-CM

## 2024-12-18 DIAGNOSIS — E78.2 MIXED HYPERLIPIDEMIA: ICD-10-CM

## 2024-12-18 DIAGNOSIS — I25.10 ATHEROSCLEROSIS OF NATIVE CORONARY ARTERY OF NATIVE HEART WITHOUT ANGINA PECTORIS: ICD-10-CM

## 2024-12-18 RX ORDER — METOPROLOL TARTRATE 25 MG/1
TABLET, FILM COATED ORAL
Qty: 45 TABLET | Refills: 3 | Status: SHIPPED | OUTPATIENT
Start: 2024-12-18

## 2025-03-10 ENCOUNTER — TRANSCRIBE ORDERS (OUTPATIENT)
Facility: HOSPITAL | Age: 80
End: 2025-03-10

## 2025-03-10 DIAGNOSIS — R10.9 ABDOMINAL PAIN, UNSPECIFIED ABDOMINAL LOCATION: Primary | ICD-10-CM

## 2025-04-11 ENCOUNTER — HOSPITAL ENCOUNTER (OUTPATIENT)
Facility: HOSPITAL | Age: 80
Discharge: HOME | End: 2025-04-14
Payer: MEDICARE

## 2025-04-11 DIAGNOSIS — R10.9 ABDOMINAL PAIN, UNSPECIFIED ABDOMINAL LOCATION: ICD-10-CM

## 2025-04-11 PROCEDURE — 76700 US EXAM ABDOM COMPLETE: CPT

## 2025-04-25 ENCOUNTER — OFFICE VISIT (OUTPATIENT)
Age: 80
End: 2025-04-25
Payer: MEDICARE

## 2025-04-25 VITALS
SYSTOLIC BLOOD PRESSURE: 120 MMHG | HEART RATE: 68 BPM | HEIGHT: 71 IN | DIASTOLIC BLOOD PRESSURE: 80 MMHG | BODY MASS INDEX: 32.48 KG/M2 | WEIGHT: 232 LBS | OXYGEN SATURATION: 96 %

## 2025-04-25 DIAGNOSIS — I10 ESSENTIAL (PRIMARY) HYPERTENSION: ICD-10-CM

## 2025-04-25 DIAGNOSIS — I50.32 CHRONIC DIASTOLIC (CONGESTIVE) HEART FAILURE (HCC): ICD-10-CM

## 2025-04-25 DIAGNOSIS — Z95.1 HX OF CABG: ICD-10-CM

## 2025-04-25 DIAGNOSIS — E78.2 MIXED HYPERLIPIDEMIA: ICD-10-CM

## 2025-04-25 DIAGNOSIS — Z95.2 S/P AVR: Primary | ICD-10-CM

## 2025-04-25 PROCEDURE — 1160F RVW MEDS BY RX/DR IN RCRD: CPT | Performed by: INTERNAL MEDICINE

## 2025-04-25 PROCEDURE — G2211 COMPLEX E/M VISIT ADD ON: HCPCS | Performed by: INTERNAL MEDICINE

## 2025-04-25 PROCEDURE — 99214 OFFICE O/P EST MOD 30 MIN: CPT | Performed by: INTERNAL MEDICINE

## 2025-04-25 PROCEDURE — 3079F DIAST BP 80-89 MM HG: CPT | Performed by: INTERNAL MEDICINE

## 2025-04-25 PROCEDURE — 1123F ACP DISCUSS/DSCN MKR DOCD: CPT | Performed by: INTERNAL MEDICINE

## 2025-04-25 PROCEDURE — 1159F MED LIST DOCD IN RCRD: CPT | Performed by: INTERNAL MEDICINE

## 2025-04-25 PROCEDURE — 3074F SYST BP LT 130 MM HG: CPT | Performed by: INTERNAL MEDICINE

## 2025-04-25 NOTE — PROGRESS NOTES
Bowen Narayanan MD., [facility]    [phone],[facility],[place] [address]    Office [phone],Fax [phone]           Julio C Gotti is here for a f/u office visit.    Primary care physician:  Leonard Steve MD    CC - as documented in EMR    Dear Leonard Short MD    I had the pleasure of seeing Mr.Ernest ZAID Gotti in the office today.      Assessment:     CAD - [facility] admit 2/24/23 NSTEMI/Aortic stenosis - 3/3/23 - CABG - LIMA to LAD/SVG to RCA/SVG to OM;  Tissue AVR with #21 CE Inspiris.  HTN  HLD  Mod Hiatal hernia on Cxray -has seen PCP        Plan:          2/6/2025-blood work by PCP-[facility] physician-reviewed-LP(a) 103 (normal less than 30); hemoglobin 13.3, platelets 131, creatinine 0.85, hemoglobin A1c 5.9, LDL 59    Blood pressure controlled however,  Continue to monitor and maintain log.    Echocardiogram - EF 55-60%; 10/2024 bioprosthetic AV mean gradient 36mm Hg ( inc from 19 mm Hg)-however patient was anemic with hemoglobin of 8.2.  Will recheck echo next visit.  Hemoglobin now 13.3.  Patient is asymptomatic.  (Per Dr. Linn-post bioprosthetic AVR-criteria to intervene is for symptoms/mean gradient greater than 40, valve area less than 1).    Aggressive cardiovascular risk factor modification.    Follow-up 6 months/earlier as needed. ECHO i 1 week prior to follow-up    Patient understands the plan. All questions were answered to the patient's satisfaction.    I appreciate the opportunity to be involved in care. See note below for details. Please do not hesitate to contact us with questions or concerns.    Bowen Narayanan MD      Cardiac Testing/ Procedures:       A.Cardiac Cath/PCI:  2/27/23 Access: R Radial Access - 6 F sheath        Difficulty advancing guide wire radial artery - angiogram; Small vessel/tortuos/lesion ( forearm) vs vasospasm     All catheter exchanges performed over guide wire     Difficult advance  6 F EBU guide for iFR ;

## 2025-04-25 NOTE — PROGRESS NOTES
Chief Complaint   Patient presents with    Coronary Artery Disease    Hypertension     HLD     Vitals:    04/25/25 1410   BP: 120/80   BP Site: Left Upper Arm   Patient Position: Sitting   BP Cuff Size: Medium Adult   Pulse: 68   SpO2: 96%   Weight: 105.2 kg (232 lb)   Height: 1.803 m (5' 11\")      /80 (BP Site: Left Upper Arm, Patient Position: Sitting, BP Cuff Size: Medium Adult)   Pulse 68   Ht 1.803 m (5' 11\")   Wt 105.2 kg (232 lb)   SpO2 96%   BMI 32.36 kg/m²

## 2025-06-22 DIAGNOSIS — I10 ESSENTIAL (PRIMARY) HYPERTENSION: ICD-10-CM

## 2025-06-22 DIAGNOSIS — E78.2 MIXED HYPERLIPIDEMIA: ICD-10-CM

## 2025-06-22 DIAGNOSIS — Z95.1 HX OF CABG: ICD-10-CM

## 2025-06-22 DIAGNOSIS — Z95.2 S/P AVR: ICD-10-CM

## 2025-06-22 DIAGNOSIS — I25.10 ATHEROSCLEROSIS OF NATIVE CORONARY ARTERY OF NATIVE HEART WITHOUT ANGINA PECTORIS: ICD-10-CM

## 2025-06-22 DIAGNOSIS — I50.32 CHRONIC DIASTOLIC (CONGESTIVE) HEART FAILURE (HCC): ICD-10-CM

## 2025-06-23 RX ORDER — ROSUVASTATIN CALCIUM 20 MG/1
20 TABLET, COATED ORAL DAILY
Qty: 90 TABLET | Refills: 3 | Status: SHIPPED | OUTPATIENT
Start: 2025-06-23

## 2025-06-23 RX ORDER — EZETIMIBE 10 MG/1
10 TABLET ORAL DAILY
Qty: 90 TABLET | Refills: 3 | Status: SHIPPED | OUTPATIENT
Start: 2025-06-23

## (undated) DEVICE — SYSTEM ENDOSCP VES HARV W/ TOOL CANN SEAL SHT PRT BLNT TIP

## (undated) DEVICE — DRAIN,WOUND,ROUND,24FR,5/16",FULL-FLUTED: Brand: MEDLINE

## (undated) DEVICE — Device

## (undated) DEVICE — 40418 TRENDELENBURG ONE-STEP ARM PROTECTORS LARGE (1 PAIR): Brand: 40418 TRENDELENBURG ONE-STEP ARM PROTECTORS LARGE (1 PAIR)

## (undated) DEVICE — EZ GLIDE AORTIC CANNULA: Brand: EDWARDS LIFESCIENCES EZ GLIDE AORTIC CANNULA

## (undated) DEVICE — ADHESIVE SKIN CLSR 0.7ML TOP DERMBND ADV

## (undated) DEVICE — COR-KNOT MINI® COMBO KITBASE PACKAGE TYPE - KITEACH STERILE PACKAGE KIT CONTAINS (2) SINGLE PATIENT USE COR-KNOT MINI® DEVICES AND (12) COR-KNOT® QUICK LOADS®.: Brand: COR-KNOT MINI®

## (undated) DEVICE — OPEN HEART B-RICHMOND: Brand: MEDLINE INDUSTRIES, INC.

## (undated) DEVICE — COR-KNOT® QUICK LOAD® SINGLES: Brand: COR-KNOT® QUICK LOAD®

## (undated) DEVICE — GLOVE SURG PF BIOGEL 8.0 LTX --

## (undated) DEVICE — GAUZE SPNG XRAY 4X4IN 16PLY -- STRL

## (undated) DEVICE — WRAP SURG W1.31XL1.34M CARD FOR PT 165-172CM THERMOWRP

## (undated) DEVICE — SYSTEM SKIN CLOSURE 42CM DERMABOND PRINEO

## (undated) DEVICE — CANNULA AORT ROOT INTRO STD TIP 5FR OVERALL LEN 55IN DLP

## (undated) DEVICE — BLADE OPHTH W1.5MM 15DEG ORNG HNDL SHRP SHRP DEL FOR CATRCT

## (undated) DEVICE — INSULATED BLADE ELECTRODE: Brand: EDGE

## (undated) DEVICE — GLIDESHEATH SLENDER ACCESS KIT: Brand: GLIDESHEATH SLENDER

## (undated) DEVICE — SUT ETHBND 3-0 36IN SH1 DA GRN --

## (undated) DEVICE — SOL INJ SOD CL 0.9% 500ML BG --

## (undated) DEVICE — SOLUTION IV 1000ML PH 7.4 INJ NRMSOL R

## (undated) DEVICE — CATH GUID COR EB35 6FR 100CM -- LAUNCHER

## (undated) DEVICE — DRSG BORDR MPLX HEEL 8.7X9.1IN --

## (undated) DEVICE — CATH 5F 100CM JL40 -- DXTERITY

## (undated) DEVICE — SYR 50ML LR LCK 1ML GRAD NSAF --

## (undated) DEVICE — SYR 3ML LL TIP 1/10ML GRAD --

## (undated) DEVICE — GOWN,SIRUS,NONRNF,SETINSLV,XL,20/CS: Brand: MEDLINE

## (undated) DEVICE — 72" ARTERIAL PRESSURE TUBING: Brand: ICU MEDICAL

## (undated) DEVICE — BAG RED 3PLY 2MIL 30X40 IN

## (undated) DEVICE — 1000ML,PRESSURE INFUSER W/STOPCOCK: Brand: MEDLINE

## (undated) DEVICE — TR BAND RADIAL ARTERY COMPRESSION DEVICE: Brand: TR BAND

## (undated) DEVICE — KIT BLWR MISTER 5P 15L W/ TBNG SET IRRIG MIST TO IMPROVE

## (undated) DEVICE — CATH 5F 100CM JL35 -- DXTERITY

## (undated) DEVICE — TIP CLEANER: Brand: VALLEYLAB

## (undated) DEVICE — SUT PROL 3-0 36IN SH DA BLU --

## (undated) DEVICE — CANN RCSP GDWIRE STYL 15FR --

## (undated) DEVICE — SYR 10ML LUER LOK 1/5ML GRAD --

## (undated) DEVICE — AVID DUAL STAGE VENOUS DRAINAGE CANNULA: Brand: AVID DUAL STAGE VENOUS DRAINAGE CANNULA

## (undated) DEVICE — TRANSFER PK BLD PROD 300ML --

## (undated) DEVICE — CATH 5F 100CM JR40 -- DXTERITY

## (undated) DEVICE — SUTURE ETHBND 2-0 30IN NONABSORB GRN WHT V-5 17MM 1/2 PXX52N

## (undated) DEVICE — HEART CATH-SFMC: Brand: MEDLINE INDUSTRIES, INC.

## (undated) DEVICE — HI-TORQUE VERSACORE MODIFIED J GUIDE WIRE SYSTEM 260 CM: Brand: HI-TORQUE VERSACORE

## (undated) DEVICE — VENT CATHETER: Brand: EDWARDS LIFESCIENCES VENT CATHETER

## (undated) DEVICE — BANDAGE COMPR ELASTIC 5 YDX6 IN

## (undated) DEVICE — BANDAGE COMPR M W6INXL10YD WHT BGE VELC E MTRX HK AND LOOP

## (undated) DEVICE — OPEN HEART A- RICHMOND: Brand: MEDLINE INDUSTRIES, INC.

## (undated) DEVICE — CANN VES FRE FLO BLNT TIP 3 --

## (undated) DEVICE — TBG INSUFFLATION LUER LOCK: Brand: MEDLINE INDUSTRIES, INC.

## (undated) DEVICE — SOLUTION IV 1000ML 140MEQ/L SOD 5MEQ/L K 3MEQ/L MG 27MEQ/L

## (undated) DEVICE — SOL INJ SOD CL 0.9% 1000ML -- NACL LIFECARE

## (undated) DEVICE — INTENDED FOR TISSUE SEPARATION, AND OTHER PROCEDURES THAT REQUIRE A SHARP SURGICAL BLADE TO PUNCTURE OR CUT.: Brand: BARD-PARKER ® CARBON RIB-BACK BLADES

## (undated) DEVICE — ANGIOGRAPHIC CATHETER: Brand: EXPO™

## (undated) DEVICE — APPLIER CLP L9.375IN APER 2.1MM CLS L3.8MM 20 SM TI CLP

## (undated) DEVICE — SEALANT TISS 10 CC FOR HUM FIBRIN VISTASEAL

## (undated) DEVICE — SUPPORT WRST AD W3.5XL9IN DIA14.5IN ART SFT ADJ HK AND LOOP

## (undated) DEVICE — SUT PROL 4-0 36IN SH DA BLU --

## (undated) DEVICE — Device: Brand: OMNIWIRE PRESSURE GUIDE WIRE

## (undated) DEVICE — 6 FOOT DISPOSABLE EXTENSION CABLE WITH SAFE CONNECT / SCREW-DOWN

## (undated) DEVICE — INTENDED TO STANDARDIZE OR CAMERAS TO ALLOW FOR THE USE OF THE OR CAMERA COVER: Brand: ASPEN® O.R. CAMERA COVER

## (undated) DEVICE — Device: Brand: JELCO

## (undated) DEVICE — PROVE COVER: Brand: UNBRANDED

## (undated) DEVICE — KIT,ANTI FOG,W/SPONGE & FLUID,SOFT PACK: Brand: MEDLINE

## (undated) DEVICE — ROSEN CURVED WIRE GUIDE: Brand: ROSEN

## (undated) DEVICE — TIDISHIELD TRANSPORT, CONTAINMENT COVER FOR BACK TABLE 4'6" (1.37M) TO 8' (2.43M) IN LENGTH: Brand: TIDISHIELD

## (undated) DEVICE — GUIDE CATH CONVEY 5FR JL3.5 -- 39228-661

## (undated) DEVICE — PRESSURE MONITORING SET: Brand: TRUWAVE

## (undated) DEVICE — SUT MCRYL 3-0 27IN PS1 UD --

## (undated) DEVICE — LUER-LOK 360°: Brand: CONNECTA, LUER-LOK

## (undated) DEVICE — SUTURE PROL SZ 5-0 L36IN NONABSORBABLE BLU L13MM C-1 3/8 8720H